# Patient Record
Sex: MALE | Race: BLACK OR AFRICAN AMERICAN | NOT HISPANIC OR LATINO | URBAN - METROPOLITAN AREA
[De-identification: names, ages, dates, MRNs, and addresses within clinical notes are randomized per-mention and may not be internally consistent; named-entity substitution may affect disease eponyms.]

---

## 2017-05-13 ENCOUNTER — EMERGENCY (EMERGENCY)
Facility: HOSPITAL | Age: 77
LOS: 1 days | Discharge: HOME | End: 2017-05-13
Attending: EMERGENCY MEDICINE
Payer: COMMERCIAL

## 2017-05-13 VITALS
WEIGHT: 160.06 LBS | TEMPERATURE: 98 F | OXYGEN SATURATION: 100 % | RESPIRATION RATE: 20 BRPM | SYSTOLIC BLOOD PRESSURE: 165 MMHG | HEART RATE: 88 BPM | HEIGHT: 71 IN | DIASTOLIC BLOOD PRESSURE: 72 MMHG

## 2017-05-13 VITALS
HEART RATE: 80 BPM | RESPIRATION RATE: 18 BRPM | DIASTOLIC BLOOD PRESSURE: 70 MMHG | SYSTOLIC BLOOD PRESSURE: 156 MMHG | TEMPERATURE: 98 F | OXYGEN SATURATION: 100 %

## 2017-05-13 DIAGNOSIS — Z98.89 OTHER SPECIFIED POSTPROCEDURAL STATES: Chronic | ICD-10-CM

## 2017-05-13 LAB
ANION GAP SERPL CALC-SCNC: 6 MMOL/L — SIGNIFICANT CHANGE UP (ref 5–17)
APPEARANCE UR: CLEAR — SIGNIFICANT CHANGE UP
BASOPHILS # BLD AUTO: 0.1 K/UL — SIGNIFICANT CHANGE UP (ref 0–0.2)
BASOPHILS NFR BLD AUTO: 1.3 % — SIGNIFICANT CHANGE UP (ref 0–2)
BILIRUB UR-MCNC: NEGATIVE — SIGNIFICANT CHANGE UP
BUN SERPL-MCNC: 14 MG/DL — SIGNIFICANT CHANGE UP (ref 7–18)
CALCIUM SERPL-MCNC: 10.4 MG/DL — SIGNIFICANT CHANGE UP (ref 8.4–10.5)
CHLORIDE SERPL-SCNC: 110 MMOL/L — HIGH (ref 96–108)
CO2 SERPL-SCNC: 26 MMOL/L — SIGNIFICANT CHANGE UP (ref 22–31)
COLOR SPEC: YELLOW — SIGNIFICANT CHANGE UP
CREAT SERPL-MCNC: 0.94 MG/DL — SIGNIFICANT CHANGE UP (ref 0.5–1.3)
DIFF PNL FLD: ABNORMAL
EOSINOPHIL # BLD AUTO: 0.3 K/UL — SIGNIFICANT CHANGE UP (ref 0–0.5)
EOSINOPHIL NFR BLD AUTO: 3.4 % — SIGNIFICANT CHANGE UP (ref 0–6)
GLUCOSE SERPL-MCNC: 89 MG/DL — SIGNIFICANT CHANGE UP (ref 70–99)
GLUCOSE UR QL: NEGATIVE — SIGNIFICANT CHANGE UP
HCT VFR BLD CALC: 43.3 % — SIGNIFICANT CHANGE UP (ref 39–50)
HGB BLD-MCNC: 14.4 G/DL — SIGNIFICANT CHANGE UP (ref 13–17)
KETONES UR-MCNC: NEGATIVE — SIGNIFICANT CHANGE UP
LACTATE SERPL-SCNC: 0.8 MMOL/L — SIGNIFICANT CHANGE UP (ref 0.7–2)
LEUKOCYTE ESTERASE UR-ACNC: NEGATIVE — SIGNIFICANT CHANGE UP
LYMPHOCYTES # BLD AUTO: 2.5 K/UL — SIGNIFICANT CHANGE UP (ref 1–3.3)
LYMPHOCYTES # BLD AUTO: 29.7 % — SIGNIFICANT CHANGE UP (ref 13–44)
MCHC RBC-ENTMCNC: 32.5 PG — SIGNIFICANT CHANGE UP (ref 27–34)
MCHC RBC-ENTMCNC: 33.1 GM/DL — SIGNIFICANT CHANGE UP (ref 32–36)
MCV RBC AUTO: 98.1 FL — SIGNIFICANT CHANGE UP (ref 80–100)
MONOCYTES # BLD AUTO: 0.7 K/UL — SIGNIFICANT CHANGE UP (ref 0–0.9)
MONOCYTES NFR BLD AUTO: 8.3 % — SIGNIFICANT CHANGE UP (ref 2–14)
NEUTROPHILS # BLD AUTO: 4.8 K/UL — SIGNIFICANT CHANGE UP (ref 1.8–7.4)
NEUTROPHILS NFR BLD AUTO: 57.3 % — SIGNIFICANT CHANGE UP (ref 43–77)
NITRITE UR-MCNC: NEGATIVE — SIGNIFICANT CHANGE UP
PH UR: 6 — SIGNIFICANT CHANGE UP (ref 5–8)
PLATELET # BLD AUTO: 171 K/UL — SIGNIFICANT CHANGE UP (ref 150–400)
POTASSIUM SERPL-MCNC: 4.1 MMOL/L — SIGNIFICANT CHANGE UP (ref 3.5–5.3)
POTASSIUM SERPL-SCNC: 4.1 MMOL/L — SIGNIFICANT CHANGE UP (ref 3.5–5.3)
PROT UR-MCNC: 15
RBC # BLD: 4.42 M/UL — SIGNIFICANT CHANGE UP (ref 4.2–5.8)
RBC # FLD: 11.9 % — SIGNIFICANT CHANGE UP (ref 10.3–14.5)
SODIUM SERPL-SCNC: 142 MMOL/L — SIGNIFICANT CHANGE UP (ref 135–145)
SP GR SPEC: 1.02 — SIGNIFICANT CHANGE UP (ref 1.01–1.02)
UROBILINOGEN FLD QL: NEGATIVE — SIGNIFICANT CHANGE UP
WBC # BLD: 8.4 K/UL — SIGNIFICANT CHANGE UP (ref 3.8–10.5)
WBC # FLD AUTO: 8.4 K/UL — SIGNIFICANT CHANGE UP (ref 3.8–10.5)

## 2017-05-13 PROCEDURE — 81001 URINALYSIS AUTO W/SCOPE: CPT

## 2017-05-13 PROCEDURE — 80048 BASIC METABOLIC PNL TOTAL CA: CPT

## 2017-05-13 PROCEDURE — 74020: CPT | Mod: 26

## 2017-05-13 PROCEDURE — 74020: CPT

## 2017-05-13 PROCEDURE — 96374 THER/PROPH/DIAG INJ IV PUSH: CPT

## 2017-05-13 PROCEDURE — 83605 ASSAY OF LACTIC ACID: CPT

## 2017-05-13 PROCEDURE — 99284 EMERGENCY DEPT VISIT MOD MDM: CPT | Mod: 25

## 2017-05-13 PROCEDURE — 85027 COMPLETE CBC AUTOMATED: CPT

## 2017-05-13 PROCEDURE — 99284 EMERGENCY DEPT VISIT MOD MDM: CPT

## 2017-05-13 RX ORDER — KETOROLAC TROMETHAMINE 30 MG/ML
30 SYRINGE (ML) INJECTION ONCE
Qty: 0 | Refills: 0 | Status: DISCONTINUED | OUTPATIENT
Start: 2017-05-13 | End: 2017-05-13

## 2017-05-13 RX ADMIN — Medication 30 MILLIGRAM(S): at 12:48

## 2017-05-13 RX ADMIN — Medication 30 MILLIGRAM(S): at 13:47

## 2017-05-13 NOTE — ED PROVIDER NOTE - OBJECTIVE STATEMENT
76 yo M with hx of HIV on BAUER, last CD4 700, presenting with multiple complaints that are non-acute.  First has had umbilical hernia x 3 years intermittently protruding with intermittent pain but no vomiting, constipation, fever or current pain.  Reducible prior to arrival.  Pt also reports positional L shoulder pain x years.  Pt also complains that last VL was slightly detectable, has fu for May 22nd to recheck VL with ID and asking to have VL checked today.

## 2017-05-13 NOTE — ED PROVIDER NOTE - GASTROINTESTINAL, MLM
Abdomen soft, non-tender, no guarding.  Umbilical hernia, soft, nontender, easily reducible.  Nl BS.

## 2017-05-13 NOTE — ED PROVIDER NOTE - PMH
Cardiomegaly    Elevated PSA    Essential hypertension    HIV (human immunodeficiency virus infection)    PVD (peripheral vascular disease)    Umbilical hernia    Urge incontinence

## 2017-05-13 NOTE — ED ADULT NURSE NOTE - OBJECTIVE STATEMENT
" I have the left shoulder pain for years but I cannot sleep . I have the ad hernia and the lower abdominal pain to the rt  side on and off '

## 2017-05-13 NOTE — ED PROVIDER NOTE - CARE PLAN
Principal Discharge DX:	Umbilical hernia without obstruction and without gangrene  Secondary Diagnosis:	Chronic left shoulder pain

## 2017-05-13 NOTE — ED PROVIDER NOTE - MEDICAL DECISION MAKING DETAILS
Chronic hernia and shoulder pain, no acute findings, wilde reassuring.  Plan outpt fu with niall and Dr. Page for elective repair.

## 2017-05-17 DIAGNOSIS — M25.512 PAIN IN LEFT SHOULDER: ICD-10-CM

## 2017-05-17 DIAGNOSIS — K42.9 UMBILICAL HERNIA WITHOUT OBSTRUCTION OR GANGRENE: ICD-10-CM

## 2017-05-17 DIAGNOSIS — Z21 ASYMPTOMATIC HUMAN IMMUNODEFICIENCY VIRUS [HIV] INFECTION STATUS: ICD-10-CM

## 2017-05-17 DIAGNOSIS — G89.29 OTHER CHRONIC PAIN: ICD-10-CM

## 2017-05-17 DIAGNOSIS — I10 ESSENTIAL (PRIMARY) HYPERTENSION: ICD-10-CM

## 2017-07-26 ENCOUNTER — APPOINTMENT (OUTPATIENT)
Dept: NUCLEAR MEDICINE | Facility: IMAGING CENTER | Age: 77
End: 2017-07-26

## 2017-07-26 ENCOUNTER — OUTPATIENT (OUTPATIENT)
Dept: OUTPATIENT SERVICES | Facility: HOSPITAL | Age: 77
LOS: 1 days | End: 2017-07-26
Payer: MEDICARE

## 2017-07-26 DIAGNOSIS — Z98.89 OTHER SPECIFIED POSTPROCEDURAL STATES: Chronic | ICD-10-CM

## 2017-07-26 DIAGNOSIS — E21.3 HYPERPARATHYROIDISM, UNSPECIFIED: ICD-10-CM

## 2017-07-26 PROCEDURE — A9512: CPT

## 2017-07-26 PROCEDURE — 78072 PARATHYRD PLANAR W/SPECT&CT: CPT

## 2017-07-26 PROCEDURE — A9500: CPT

## 2018-01-08 ENCOUNTER — OUTPATIENT (OUTPATIENT)
Dept: OUTPATIENT SERVICES | Facility: HOSPITAL | Age: 78
LOS: 1 days | End: 2018-01-08
Payer: MEDICARE

## 2018-01-08 VITALS
OXYGEN SATURATION: 100 % | HEART RATE: 95 BPM | DIASTOLIC BLOOD PRESSURE: 88 MMHG | HEIGHT: 71 IN | TEMPERATURE: 98 F | WEIGHT: 149.03 LBS | SYSTOLIC BLOOD PRESSURE: 157 MMHG | RESPIRATION RATE: 19 BRPM

## 2018-01-08 DIAGNOSIS — M75.42 IMPINGEMENT SYNDROME OF LEFT SHOULDER: ICD-10-CM

## 2018-01-08 DIAGNOSIS — M75.122 COMPLETE ROTATOR CUFF TEAR OR RUPTURE OF LEFT SHOULDER, NOT SPECIFIED AS TRAUMATIC: ICD-10-CM

## 2018-01-08 DIAGNOSIS — Z01.818 ENCOUNTER FOR OTHER PREPROCEDURAL EXAMINATION: ICD-10-CM

## 2018-01-08 DIAGNOSIS — Z98.890 OTHER SPECIFIED POSTPROCEDURAL STATES: Chronic | ICD-10-CM

## 2018-01-08 DIAGNOSIS — Z98.89 OTHER SPECIFIED POSTPROCEDURAL STATES: Chronic | ICD-10-CM

## 2018-01-08 PROCEDURE — G0463: CPT

## 2018-01-08 NOTE — H&P PST ADULT - ATTENDING COMMENTS
78yo with long h/o left shoulder pain increasing with restricted motion.  MRI left shoulder c/w rotator cuff tear and impingement.  For left shoulder arthroscopy.  Discussed with pt at length about MRI findings and recommended arthroscopy. Discussed alternatives, and risks including medical, anesthesia, infection, blood clot, possible need for additional surgery, and stiffness.   All questions answered and consent signed.

## 2018-01-08 NOTE — H&P PST ADULT - PSH
History of cystoscopy  with photovaporization, green light laser lithotripsy 2016  S/P hernia repair  right inguinal - 2013

## 2018-01-08 NOTE — H&P PST ADULT - ASSESSMENT
77year old male with complete rotator cuff or rupture of left shoulder/Impingement syndrome of left shoulder

## 2018-01-08 NOTE — H&P PST ADULT - HISTORY OF PRESENT ILLNESS
77year old male with pmhx of enlarged prostate, HIV in the 90"s, shingles, umbilical hernia, urinary incontinence with ocassional dysuria presents today with c/o left shoulder pain x 30years that has progressively gotten worse over the last 5years. Patient is here today for presurgical testing for scheduled left shoulder arthroscopy on 1/12/18

## 2018-01-08 NOTE — H&P PST ADULT - PMH
Cardiomegaly    Elevated PSA    Essential hypertension    HIV (human immunodeficiency virus infection)    PVD (peripheral vascular disease)    Shingles    Umbilical hernia    Urge incontinence

## 2018-01-11 RX ORDER — SODIUM CHLORIDE 9 MG/ML
3 INJECTION INTRAMUSCULAR; INTRAVENOUS; SUBCUTANEOUS EVERY 8 HOURS
Qty: 0 | Refills: 0 | Status: DISCONTINUED | OUTPATIENT
Start: 2018-01-12 | End: 2018-01-20

## 2018-01-11 RX ORDER — ACETAMINOPHEN 500 MG
975 TABLET ORAL ONCE
Qty: 0 | Refills: 0 | Status: COMPLETED | OUTPATIENT
Start: 2018-01-12 | End: 2018-01-12

## 2018-01-11 RX ORDER — CELECOXIB 200 MG/1
200 CAPSULE ORAL ONCE
Qty: 0 | Refills: 0 | Status: COMPLETED | OUTPATIENT
Start: 2018-01-12 | End: 2018-01-12

## 2018-01-12 ENCOUNTER — OUTPATIENT (OUTPATIENT)
Dept: OUTPATIENT SERVICES | Facility: HOSPITAL | Age: 78
LOS: 1 days | End: 2018-01-12
Payer: MEDICARE

## 2018-01-12 VITALS
OXYGEN SATURATION: 97 % | DIASTOLIC BLOOD PRESSURE: 81 MMHG | HEART RATE: 106 BPM | TEMPERATURE: 98 F | WEIGHT: 149.03 LBS | RESPIRATION RATE: 16 BRPM | SYSTOLIC BLOOD PRESSURE: 147 MMHG | HEIGHT: 71 IN

## 2018-01-12 VITALS
TEMPERATURE: 98 F | DIASTOLIC BLOOD PRESSURE: 74 MMHG | RESPIRATION RATE: 16 BRPM | OXYGEN SATURATION: 100 % | SYSTOLIC BLOOD PRESSURE: 135 MMHG | HEART RATE: 88 BPM

## 2018-01-12 DIAGNOSIS — Z01.818 ENCOUNTER FOR OTHER PREPROCEDURAL EXAMINATION: ICD-10-CM

## 2018-01-12 DIAGNOSIS — M75.42 IMPINGEMENT SYNDROME OF LEFT SHOULDER: ICD-10-CM

## 2018-01-12 DIAGNOSIS — Z98.89 OTHER SPECIFIED POSTPROCEDURAL STATES: Chronic | ICD-10-CM

## 2018-01-12 DIAGNOSIS — M75.122 COMPLETE ROTATOR CUFF TEAR OR RUPTURE OF LEFT SHOULDER, NOT SPECIFIED AS TRAUMATIC: ICD-10-CM

## 2018-01-12 DIAGNOSIS — Z98.890 OTHER SPECIFIED POSTPROCEDURAL STATES: Chronic | ICD-10-CM

## 2018-01-12 PROCEDURE — 29827 SHO ARTHRS SRG RT8TR CUF RPR: CPT | Mod: LT

## 2018-01-12 PROCEDURE — 29826 SHO ARTHRS SRG DECOMPRESSION: CPT | Mod: LT

## 2018-01-12 PROCEDURE — C1713: CPT

## 2018-01-12 RX ORDER — HYDROMORPHONE HYDROCHLORIDE 2 MG/ML
0.5 INJECTION INTRAMUSCULAR; INTRAVENOUS; SUBCUTANEOUS
Qty: 0 | Refills: 0 | Status: DISCONTINUED | OUTPATIENT
Start: 2018-01-12 | End: 2018-01-12

## 2018-01-12 RX ADMIN — SODIUM CHLORIDE 3 MILLILITER(S): 9 INJECTION INTRAMUSCULAR; INTRAVENOUS; SUBCUTANEOUS at 08:05

## 2018-01-12 RX ADMIN — Medication 975 MILLIGRAM(S): at 08:03

## 2018-01-12 RX ADMIN — CELECOXIB 200 MILLIGRAM(S): 200 CAPSULE ORAL at 08:03

## 2018-01-12 NOTE — ASU DISCHARGE PLAN (ADULT/PEDIATRIC). - ACTIVITY LEVEL
Keep left arm in shoulder immobilizer elevate extremity/no exercise/no heavy lifting/Keep left arm in shoulder immobilizer/no sports/gym

## 2018-01-12 NOTE — ASU DISCHARGE PLAN (ADULT/PEDIATRIC). - NOTIFY
Numbness, color, or temperature change to extremity/Pain not relieved by Medications/Fever greater than 101 Numbness, color, or temperature change to extremity/Bleeding that does not stop/Fever greater than 101/Pain not relieved by Medications

## 2018-01-12 NOTE — BRIEF OPERATIVE NOTE - POST-OP DX
Complete tear of left rotator cuff  01/12/2018  left shoulder.  partial labral tear, chondromalacia, bursitis, AC joint arthritis.  Active  Dez Linn

## 2018-01-12 NOTE — ASU DISCHARGE PLAN (ADULT/PEDIATRIC). - PAIN
prescription given by MD/patient has pain medication at home.Do not need a new Rx. prescription given by MD/patient has pain medication at home. Do not need a new Rx.

## 2018-01-12 NOTE — BRIEF OPERATIVE NOTE - PROCEDURE
<<-----Click on this checkbox to enter Procedure Arthroscopy  01/12/2018  left shoulder.  rotator cuff repair, debridement of labral tear, acromialplasty and subacromial decompression.  Active  SPILLAI

## 2018-01-12 NOTE — ASU DISCHARGE PLAN (ADULT/PEDIATRIC). - MEDICATION SUMMARY - MEDICATIONS TO TAKE
I will START or STAY ON the medications listed below when I get home from the hospital:    Ultram 50 mg oral tablet  -- 1 tab(s) by mouth every 4 hours, As Needed for pain  -- Indication: For pain    Genvoya oral tablet  -- 1 tab(s) by mouth once a day  -- Indication: For as directed    amLODIPine 10 mg oral tablet  -- 1 tab(s) by mouth once a day  -- Indication: For as directed    cilostazol 100 mg oral tablet  -- 1 tab(s) by mouth 2 times a day  -- Indication: For as directed

## 2018-01-12 NOTE — ASU DISCHARGE PLAN (ADULT/PEDIATRIC). - MEDICATION SUMMARY - MEDICATIONS TO STOP TAKING
I will STOP taking the medications listed below when I get home from the hospital:    acetaminophen-oxyCODONE 325 mg-5 mg oral tablet  -- 1 tab(s) by mouth every 4 hours, As needed, Moderate Pain

## 2018-01-15 ENCOUNTER — TRANSCRIPTION ENCOUNTER (OUTPATIENT)
Age: 78
End: 2018-01-15

## 2018-06-14 ENCOUNTER — EMERGENCY (EMERGENCY)
Facility: HOSPITAL | Age: 78
LOS: 1 days | Discharge: HOME | End: 2018-06-14
Attending: EMERGENCY MEDICINE
Payer: MEDICARE

## 2018-06-14 VITALS
TEMPERATURE: 98 F | RESPIRATION RATE: 20 BRPM | DIASTOLIC BLOOD PRESSURE: 85 MMHG | HEIGHT: 71 IN | SYSTOLIC BLOOD PRESSURE: 146 MMHG | WEIGHT: 162.92 LBS | OXYGEN SATURATION: 99 % | HEART RATE: 93 BPM

## 2018-06-14 VITALS
SYSTOLIC BLOOD PRESSURE: 152 MMHG | OXYGEN SATURATION: 100 % | HEART RATE: 68 BPM | RESPIRATION RATE: 20 BRPM | DIASTOLIC BLOOD PRESSURE: 80 MMHG | TEMPERATURE: 98 F

## 2018-06-14 DIAGNOSIS — Z98.89 OTHER SPECIFIED POSTPROCEDURAL STATES: Chronic | ICD-10-CM

## 2018-06-14 DIAGNOSIS — Z98.890 OTHER SPECIFIED POSTPROCEDURAL STATES: Chronic | ICD-10-CM

## 2018-06-14 LAB
ALBUMIN SERPL ELPH-MCNC: 3.8 G/DL — SIGNIFICANT CHANGE UP (ref 3.5–5)
ALP SERPL-CCNC: 117 U/L — SIGNIFICANT CHANGE UP (ref 40–120)
ALT FLD-CCNC: 29 U/L DA — SIGNIFICANT CHANGE UP (ref 10–60)
ANION GAP SERPL CALC-SCNC: 8 MMOL/L — SIGNIFICANT CHANGE UP (ref 5–17)
APPEARANCE UR: CLEAR — SIGNIFICANT CHANGE UP
AST SERPL-CCNC: 22 U/L — SIGNIFICANT CHANGE UP (ref 10–40)
BASOPHILS # BLD AUTO: 0.1 K/UL — SIGNIFICANT CHANGE UP (ref 0–0.2)
BASOPHILS NFR BLD AUTO: 1.1 % — SIGNIFICANT CHANGE UP (ref 0–2)
BILIRUB SERPL-MCNC: 0.3 MG/DL — SIGNIFICANT CHANGE UP (ref 0.2–1.2)
BILIRUB UR-MCNC: NEGATIVE — SIGNIFICANT CHANGE UP
BUN SERPL-MCNC: 15 MG/DL — SIGNIFICANT CHANGE UP (ref 7–18)
CALCIUM SERPL-MCNC: 10.4 MG/DL — SIGNIFICANT CHANGE UP (ref 8.4–10.5)
CHLORIDE SERPL-SCNC: 107 MMOL/L — SIGNIFICANT CHANGE UP (ref 96–108)
CO2 SERPL-SCNC: 25 MMOL/L — SIGNIFICANT CHANGE UP (ref 22–31)
COLOR SPEC: SIGNIFICANT CHANGE UP
CREAT SERPL-MCNC: 1.21 MG/DL — SIGNIFICANT CHANGE UP (ref 0.5–1.3)
DIFF PNL FLD: NEGATIVE — SIGNIFICANT CHANGE UP
EOSINOPHIL # BLD AUTO: 0.4 K/UL — SIGNIFICANT CHANGE UP (ref 0–0.5)
EOSINOPHIL NFR BLD AUTO: 3.6 % — SIGNIFICANT CHANGE UP (ref 0–6)
GLUCOSE SERPL-MCNC: 88 MG/DL — SIGNIFICANT CHANGE UP (ref 70–99)
GLUCOSE UR QL: NEGATIVE — SIGNIFICANT CHANGE UP
HCT VFR BLD CALC: 44.7 % — SIGNIFICANT CHANGE UP (ref 39–50)
HGB BLD-MCNC: 13.9 G/DL — SIGNIFICANT CHANGE UP (ref 13–17)
KETONES UR-MCNC: NEGATIVE — SIGNIFICANT CHANGE UP
LEUKOCYTE ESTERASE UR-ACNC: NEGATIVE — SIGNIFICANT CHANGE UP
LIDOCAIN IGE QN: 162 U/L — SIGNIFICANT CHANGE UP (ref 73–393)
LYMPHOCYTES # BLD AUTO: 2.4 K/UL — SIGNIFICANT CHANGE UP (ref 1–3.3)
LYMPHOCYTES # BLD AUTO: 22.6 % — SIGNIFICANT CHANGE UP (ref 13–44)
MCHC RBC-ENTMCNC: 30.6 PG — SIGNIFICANT CHANGE UP (ref 27–34)
MCHC RBC-ENTMCNC: 31.1 GM/DL — LOW (ref 32–36)
MCV RBC AUTO: 98.4 FL — SIGNIFICANT CHANGE UP (ref 80–100)
MONOCYTES # BLD AUTO: 0.5 K/UL — SIGNIFICANT CHANGE UP (ref 0–0.9)
MONOCYTES NFR BLD AUTO: 5.1 % — SIGNIFICANT CHANGE UP (ref 2–14)
NEUTROPHILS # BLD AUTO: 7.2 K/UL — SIGNIFICANT CHANGE UP (ref 1.8–7.4)
NEUTROPHILS NFR BLD AUTO: 67.7 % — SIGNIFICANT CHANGE UP (ref 43–77)
NITRITE UR-MCNC: NEGATIVE — SIGNIFICANT CHANGE UP
PH UR: 6 — SIGNIFICANT CHANGE UP (ref 5–8)
PLATELET # BLD AUTO: 199 K/UL — SIGNIFICANT CHANGE UP (ref 150–400)
POTASSIUM SERPL-MCNC: 5 MMOL/L — SIGNIFICANT CHANGE UP (ref 3.5–5.3)
POTASSIUM SERPL-SCNC: 5 MMOL/L — SIGNIFICANT CHANGE UP (ref 3.5–5.3)
PROT SERPL-MCNC: 7.4 G/DL — SIGNIFICANT CHANGE UP (ref 6–8.3)
PROT UR-MCNC: NEGATIVE — SIGNIFICANT CHANGE UP
RBC # BLD: 4.54 M/UL — SIGNIFICANT CHANGE UP (ref 4.2–5.8)
RBC # FLD: 13.3 % — SIGNIFICANT CHANGE UP (ref 10.3–14.5)
SODIUM SERPL-SCNC: 140 MMOL/L — SIGNIFICANT CHANGE UP (ref 135–145)
SP GR SPEC: 1.01 — SIGNIFICANT CHANGE UP (ref 1.01–1.02)
UROBILINOGEN FLD QL: NEGATIVE — SIGNIFICANT CHANGE UP
WBC # BLD: 10.6 K/UL — HIGH (ref 3.8–10.5)
WBC # FLD AUTO: 10.6 K/UL — HIGH (ref 3.8–10.5)

## 2018-06-14 PROCEDURE — 99285 EMERGENCY DEPT VISIT HI MDM: CPT

## 2018-06-14 PROCEDURE — 81003 URINALYSIS AUTO W/O SCOPE: CPT

## 2018-06-14 PROCEDURE — 85027 COMPLETE CBC AUTOMATED: CPT

## 2018-06-14 PROCEDURE — 87086 URINE CULTURE/COLONY COUNT: CPT

## 2018-06-14 PROCEDURE — 74177 CT ABD & PELVIS W/CONTRAST: CPT | Mod: 26

## 2018-06-14 PROCEDURE — 74177 CT ABD & PELVIS W/CONTRAST: CPT

## 2018-06-14 PROCEDURE — 80053 COMPREHEN METABOLIC PANEL: CPT

## 2018-06-14 PROCEDURE — 83690 ASSAY OF LIPASE: CPT

## 2018-06-14 PROCEDURE — 99284 EMERGENCY DEPT VISIT MOD MDM: CPT | Mod: 25

## 2018-06-14 NOTE — ED ADULT NURSE NOTE - CHPI ED SYMPTOMS NEG
no blood in stool/no dysuria/no diarrhea/no chills/no vomiting/no fever/no abdominal distension/no burning urination/no nausea/no hematuria

## 2018-06-14 NOTE — ED PROVIDER NOTE - PMH
Cardiomegaly    Elevated PSA    Enlarged prostate    Essential hypertension    HIV (human immunodeficiency virus infection)    PVD (peripheral vascular disease)    Shingles    Umbilical hernia    Urge incontinence

## 2018-06-14 NOTE — ED ADULT NURSE NOTE - OBJECTIVE STATEMENT
Patient presents to ED c/o abdominal pain  on and off for past couple of months. Patient states he has prostate problems and at present has no pain. Patient states"pain comes in intervals and he has no control over his urination at present he is wearing diaper.

## 2018-06-14 NOTE — ED PROVIDER NOTE - MEDICAL DECISION MAKING DETAILS
Pt with lower abd pain. Will check labs, UA, CT and reassess. Pt with lower abd pain. Will check labs, UA, CT and reassess.  labs CT scan normal. home with primary care physician followup

## 2018-06-14 NOTE — ED ADULT NURSE NOTE - AREA
CHYNA courtesy reminder call to patient; patient answered at [phone]. SW reminded patient of date, time, address, and bus route of her [facility] counseling appt tomorrow. Patient had concerns with having another appt at 2 tomorrow; SW encouraged patient to go to both appts but that if she felt it was going to be to hard she should call and reschedule at [facility] by calling [phone] (patient wrote phone number and appt details down). Patient stated no other questions or concerns at this time. Patient is aware SW available as future needs arise.    diffuse

## 2018-06-14 NOTE — ED PROVIDER NOTE - OBJECTIVE STATEMENT
77 y/o M pt with PMHx of cardiomegaly, elevated PSA, enlarged prostate, HTN, HIV, PVD, shingles, umbilical hernia s/p repair, and urge incontinence presents to ED c/o lower abd pain, intermittently x "several" months; worsening x 3 months. Pt states pain is worse when lying down. Pt denies fever, chills, nausea, vomiting, diarrhea, decreases appetite, hematuria or any other complaints. Last prostate exam, done by [facility], done 3 months ago showed enlarged prostate, as per pt.

## 2018-06-15 LAB
CULTURE RESULTS: SIGNIFICANT CHANGE UP
SPECIMEN SOURCE: SIGNIFICANT CHANGE UP

## 2018-06-27 ENCOUNTER — APPOINTMENT (OUTPATIENT)
Dept: UROLOGY | Facility: CLINIC | Age: 78
End: 2018-06-27
Payer: MEDICARE

## 2018-06-27 ENCOUNTER — LABORATORY RESULT (OUTPATIENT)
Age: 78
End: 2018-06-27

## 2018-06-27 DIAGNOSIS — Z86.79 PERSONAL HISTORY OF OTHER DISEASES OF THE CIRCULATORY SYSTEM: ICD-10-CM

## 2018-06-27 DIAGNOSIS — B20 HUMAN IMMUNODEFICIENCY VIRUS [HIV] DISEASE: ICD-10-CM

## 2018-06-27 PROCEDURE — 99213 OFFICE O/P EST LOW 20 MIN: CPT

## 2018-07-23 PROBLEM — B02.9 ZOSTER WITHOUT COMPLICATIONS: Chronic | Status: ACTIVE | Noted: 2018-01-08

## 2018-07-23 PROBLEM — N40.0 BENIGN PROSTATIC HYPERPLASIA WITHOUT LOWER URINARY TRACT SYMPTOMS: Chronic | Status: ACTIVE | Noted: 2018-06-14

## 2018-07-25 ENCOUNTER — APPOINTMENT (OUTPATIENT)
Dept: UROLOGY | Facility: CLINIC | Age: 78
End: 2018-07-25
Payer: MEDICARE

## 2018-07-25 VITALS
SYSTOLIC BLOOD PRESSURE: 118 MMHG | OXYGEN SATURATION: 98 % | RESPIRATION RATE: 18 BRPM | DIASTOLIC BLOOD PRESSURE: 60 MMHG | WEIGHT: 173 LBS | HEART RATE: 88 BPM

## 2018-07-25 PROCEDURE — 51741 ELECTRO-UROFLOWMETRY FIRST: CPT

## 2018-07-25 PROCEDURE — 52000 CYSTOURETHROSCOPY: CPT

## 2018-10-04 ENCOUNTER — APPOINTMENT (OUTPATIENT)
Dept: NEUROLOGY | Facility: CLINIC | Age: 78
End: 2018-10-04

## 2018-12-14 ENCOUNTER — APPOINTMENT (OUTPATIENT)
Dept: UROLOGY | Facility: CLINIC | Age: 78
End: 2018-12-14
Payer: MEDICARE

## 2018-12-14 VITALS
WEIGHT: 180 LBS | HEART RATE: 84 BPM | RESPIRATION RATE: 16 BRPM | OXYGEN SATURATION: 97 % | HEIGHT: 71 IN | BODY MASS INDEX: 25.2 KG/M2 | SYSTOLIC BLOOD PRESSURE: 140 MMHG | DIASTOLIC BLOOD PRESSURE: 74 MMHG

## 2018-12-14 PROCEDURE — 99213 OFFICE O/P EST LOW 20 MIN: CPT

## 2018-12-18 LAB
APPEARANCE: CLEAR
BACTERIA UR CULT: ABNORMAL
BACTERIA: ABNORMAL
BILIRUBIN URINE: NEGATIVE
BLOOD URINE: NEGATIVE
COLOR: YELLOW
GLUCOSE QUALITATIVE U: NEGATIVE MG/DL
HYALINE CASTS: 1 /LPF
KETONES URINE: NEGATIVE
LEUKOCYTE ESTERASE URINE: ABNORMAL
MICROSCOPIC-UA: NORMAL
NITRITE URINE: POSITIVE
PH URINE: 6
PROTEIN URINE: NEGATIVE MG/DL
RED BLOOD CELLS URINE: 2 /HPF
SPECIFIC GRAVITY URINE: 1.02
SQUAMOUS EPITHELIAL CELLS: 1 /HPF
UROBILINOGEN URINE: NEGATIVE MG/DL
WHITE BLOOD CELLS URINE: 18 /HPF

## 2018-12-20 ENCOUNTER — RESULT REVIEW (OUTPATIENT)
Age: 78
End: 2018-12-20

## 2019-09-11 ENCOUNTER — APPOINTMENT (OUTPATIENT)
Dept: UROLOGY | Facility: CLINIC | Age: 79
End: 2019-09-11
Payer: MEDICARE

## 2019-09-11 VITALS
DIASTOLIC BLOOD PRESSURE: 81 MMHG | BODY MASS INDEX: 24.92 KG/M2 | WEIGHT: 178 LBS | SYSTOLIC BLOOD PRESSURE: 132 MMHG | HEART RATE: 95 BPM | HEIGHT: 71 IN

## 2019-09-11 PROCEDURE — 99214 OFFICE O/P EST MOD 30 MIN: CPT

## 2019-09-11 NOTE — HISTORY OF PRESENT ILLNESS
[FreeTextEntry1] : cc incontinence \par s/p greenlight still has frequency urgency nocturia \par last psa 4.3\par still drinks 3 cups coffee and 3 cups coke daily \par \par Units\par \par 4mo ago\par (7/19/18)\par \par 11mo ago\par (1/10/18)\par \par 1yr ago\par (5/17/17)\par \par 2yr ago\par (3/1/16)\par \par 3yr ago\par (12/8/15)\par \par 3yr ago\par (12/29/14)\par \par 4yr ago\par (5/13/14)\par \par PSA, Total Screen, Medicare <=4.0 ng/mL 2.8  2.6 CM 3.7 CM 1.8 CM 1.6 CM 1.9 CM 5.31 Abnormally high  R \par

## 2019-09-13 LAB
APPEARANCE: ABNORMAL
BACTERIA: ABNORMAL
BILIRUBIN URINE: NEGATIVE
BLOOD URINE: NEGATIVE
COLOR: YELLOW
GLUCOSE QUALITATIVE U: NEGATIVE
HYALINE CASTS: 3 /LPF
KETONES URINE: NEGATIVE
LEUKOCYTE ESTERASE URINE: ABNORMAL
MICROSCOPIC-UA: NORMAL
NITRITE URINE: NEGATIVE
PH URINE: 6
PROTEIN URINE: NORMAL
RED BLOOD CELLS URINE: 3 /HPF
SPECIFIC GRAVITY URINE: 1.02
SQUAMOUS EPITHELIAL CELLS: 4 /HPF
UROBILINOGEN URINE: NORMAL
WHITE BLOOD CELLS URINE: 11 /HPF

## 2019-09-19 LAB — BACTERIA UR CULT: ABNORMAL

## 2019-09-23 ENCOUNTER — RESULT REVIEW (OUTPATIENT)
Age: 79
End: 2019-09-23

## 2019-10-23 ENCOUNTER — APPOINTMENT (OUTPATIENT)
Dept: UROLOGY | Facility: CLINIC | Age: 79
End: 2019-10-23
Payer: MEDICARE

## 2019-10-23 VITALS
HEART RATE: 100 BPM | HEIGHT: 71 IN | DIASTOLIC BLOOD PRESSURE: 86 MMHG | WEIGHT: 178 LBS | SYSTOLIC BLOOD PRESSURE: 131 MMHG | BODY MASS INDEX: 24.92 KG/M2 | TEMPERATURE: 97 F

## 2019-10-23 PROCEDURE — 99214 OFFICE O/P EST MOD 30 MIN: CPT

## 2019-10-23 NOTE — HISTORY OF PRESENT ILLNESS
[FreeTextEntry1] : cc incontinence \par s/p greenlight still has frequency urgency nocturia \par last psa 4.3\par still drinks 3 cups coffee and 3 cups coke daily \par last vist had proteus uti \par completed abx feels alittle better\par renal bladder sono \par mildly elevated residual 98 ml \par enlarged prostate no stones\par c/o ed \par worsening last few years difficulty maintaining erections \par has reasonable exercise tolerance \par Units\par \par 4mo ago\par (7/19/18)\par \par 11mo ago\par (1/10/18)\par \par 1yr ago\par (5/17/17)\par \par 2yr ago\par (3/1/16)\par \par 3yr ago\par (12/8/15)\par \par 3yr ago\par (12/29/14)\par \par 4yr ago\par (5/13/14)\par \par PSA, Total Screen, Medicare <=4.0 ng/mL 2.8  2.6 CM 3.7 CM 1.8 CM 1.6 CM 1.9 CM 5.31 Abnormally high  R \par

## 2019-10-23 NOTE — PHYSICAL EXAM
[General Appearance - Well Developed] : well developed [General Appearance - Well Nourished] : well nourished [Normal Appearance] : normal appearance [Well Groomed] : well groomed [General Appearance - In No Acute Distress] : no acute distress [Abdomen Tenderness] : non-tender [Abdomen Soft] : soft [Urethral Meatus] : meatus normal [Costovertebral Angle Tenderness] : no ~M costovertebral angle tenderness [Urinary Bladder Findings] : the bladder was normal on palpation [Scrotum] : the scrotum was normal [Testes Mass (___cm)] : there were no testicular masses [Edema] : no peripheral edema [] : no respiratory distress [Respiration, Rhythm And Depth] : normal respiratory rhythm and effort [Oriented To Time, Place, And Person] : oriented to person, place, and time [Exaggerated Use Of Accessory Muscles For Inspiration] : no accessory muscle use [Affect] : the affect was normal [Mood] : the mood was normal [Not Anxious] : not anxious [Normal Station and Gait] : the gait and station were normal for the patient's age [No Palpable Adenopathy] : no palpable adenopathy [No Focal Deficits] : no focal deficits

## 2019-10-23 NOTE — ASSESSMENT
[FreeTextEntry1] : poncho reviewed \par no stones\par repeat ucx\par must decrease caffeine intake \par cont meds\par \par reviewed pde5 inhibitors \par concered about his medical conditions \par advise f/u cardiology prior to initiating treament

## 2019-10-27 LAB — BACTERIA UR CULT: ABNORMAL

## 2019-10-28 ENCOUNTER — RESULT REVIEW (OUTPATIENT)
Age: 79
End: 2019-10-28

## 2020-02-24 NOTE — ASU PATIENT PROFILE, ADULT - CIGARETTE, PACK YRS
LOS: 2 days  PATIENT IS NOT A MEDICARE BUNDLE OR A 30 DAY READMISSION  Met with patient  Explained role of care management  Patient lives alone in a one story home with no steps to enter  He is independent adl's and ambulation, drives, is self employed, provides his own meals  DME - denies  Past services - denies history of VNA, SNF, MH or D&A  Pharmacy of choice is [facility] in [address]  He has a prescription plan and is able to afford his medication  Patient had concerns about hospital bill  Discussed Medicare part A and $1408 deductible for 2020  His brother is his POA/he has an AD  States his siblings would help at home if needed  He prefers to return home at discharge and does anticipate any discharge needs  Will follow  CM reviewed d/c planning process including the following: identifying help at home, patient preference for d/c planning needs,  availability of treatment team to discuss questions or concerns patient and/or family may have regarding understanding medications and recognizing signs and symptoms once discharged  CM also encouraged patient to follow up with all recommended appointments after discharge  Patient advised of importance for patient and family to participate in managing patients medical well being 
LOS: 6   Met with patient, he is for discharge today  Patient notified this CM he has a PCP, Dr Juan José Macias in [place] number [phone]  Offered to schedule a follow up appointment and he stated he would do it to fit his schedule Educated the patienton the importance of following up with his PCP  Patient also reports he uses [facility] in [place], [phone] for his medication  Notiifed Kira Chavez  Patient's nurse of above in order for patient's script to be faxed to [facility]  Friend to transport patient home 
1

## 2021-01-27 ENCOUNTER — APPOINTMENT (OUTPATIENT)
Dept: UROLOGY | Facility: CLINIC | Age: 81
End: 2021-01-27
Payer: MEDICARE

## 2021-01-27 PROCEDURE — 99214 OFFICE O/P EST MOD 30 MIN: CPT

## 2021-01-27 PROCEDURE — 99072 ADDL SUPL MATRL&STAF TM PHE: CPT

## 2021-02-02 LAB — BACTERIA UR CULT: NORMAL

## 2021-02-02 NOTE — HISTORY OF PRESENT ILLNESS
[FreeTextEntry1] : cc incontinence \par s/p greenlight still has frequency urgency nocturia \par last psa 3.8 11/20\par still drinks 3 cups coffee and 3 cups coke daily \par last vist had proteus uti \par completed abx feels alittle better\par renal bladder sono \par mildly elevated residual 98 ml \par enlarged prostate no stones\par c/o ed \par worsening last few years difficulty maintaining erections \par has reasonable exercise tolerance \par Units\par \par PSA, Total Screen, Medicare <=4.0 ng/mL 2.8  2.6 CM 3.7 CM 1.8 CM 1.6 CM 1.9 CM 5.31 Abnormally high  R \par

## 2021-07-06 NOTE — H&P PST ADULT - RESPIRATORY
I reviewed the audiogram report from 6/29/2021 compared with 6/26/2019 which appears slightly improved regarding the right hearing.   Breath Sounds equal & clear to percussion & auscultation, no accessory muscle use

## 2021-09-20 NOTE — ASU PATIENT PROFILE, ADULT - ABILITY TO HEAR (WITH HEARING AID OR HEARING APPLIANCE IF NORMALLY USED):
Adequate: hears normal conversation without difficulty Nostril Rim Text: The closure involved the nostril rim.

## 2021-10-08 ENCOUNTER — APPOINTMENT (OUTPATIENT)
Dept: UROLOGY | Facility: CLINIC | Age: 81
End: 2021-10-08
Payer: MEDICARE

## 2021-10-08 DIAGNOSIS — R39.15 URGENCY OF URINATION: ICD-10-CM

## 2021-10-08 PROCEDURE — 99213 OFFICE O/P EST LOW 20 MIN: CPT

## 2021-10-14 ENCOUNTER — NON-APPOINTMENT (OUTPATIENT)
Age: 81
End: 2021-10-14

## 2021-10-14 LAB — BACTERIA UR CULT: ABNORMAL

## 2021-10-27 PROBLEM — R39.15 URGENCY OF URINATION: Status: ACTIVE | Noted: 2018-07-25

## 2021-12-23 NOTE — CONSULT LETTER
[Dear  ___] : Dear  [unfilled], [Consult Letter:] : I had the pleasure of evaluating your patient, [unfilled]. [Consult Closing:] : Thank you very much for allowing me to participate in the care of this patient.  If you have any questions, please do not hesitate to contact me. [Sincerely,] : Sincerely, [FreeTextEntry3] : Dr Page

## 2021-12-27 ENCOUNTER — APPOINTMENT (OUTPATIENT)
Dept: SURGERY | Facility: CLINIC | Age: 81
End: 2021-12-27

## 2022-01-03 ENCOUNTER — APPOINTMENT (OUTPATIENT)
Dept: SURGERY | Facility: CLINIC | Age: 82
End: 2022-01-03
Payer: MEDICARE

## 2022-01-03 VITALS — HEART RATE: 76 BPM | DIASTOLIC BLOOD PRESSURE: 76 MMHG | SYSTOLIC BLOOD PRESSURE: 121 MMHG

## 2022-01-03 VITALS — HEIGHT: 70 IN | BODY MASS INDEX: 22.48 KG/M2 | WEIGHT: 157 LBS

## 2022-01-03 VITALS — TEMPERATURE: 96.8 F

## 2022-01-03 DIAGNOSIS — K42.9 UMBILICAL HERNIA W/OUT OBSTRUCTION OR GANGRENE: ICD-10-CM

## 2022-01-03 DIAGNOSIS — I10 ESSENTIAL (PRIMARY) HYPERTENSION: ICD-10-CM

## 2022-01-03 DIAGNOSIS — Z78.9 OTHER SPECIFIED HEALTH STATUS: ICD-10-CM

## 2022-01-03 PROCEDURE — 99203 OFFICE O/P NEW LOW 30 MIN: CPT

## 2022-01-03 RX ORDER — CHROMIUM 200 MCG
TABLET ORAL
Refills: 0 | Status: ACTIVE | COMMUNITY

## 2022-01-03 RX ORDER — TRAMADOL HYDROCHLORIDE 25 MG/1
TABLET, COATED ORAL
Refills: 0 | Status: ACTIVE | COMMUNITY

## 2022-01-03 RX ORDER — RAMIPRIL 2.5 MG/1
2.5 CAPSULE ORAL
Refills: 0 | Status: ACTIVE | COMMUNITY

## 2022-01-03 RX ORDER — ASPIRIN 325 MG/1
TABLET, FILM COATED ORAL
Refills: 0 | Status: ACTIVE | COMMUNITY

## 2022-01-03 RX ORDER — SULFAMETHOXAZOLE AND TRIMETHOPRIM 800; 160 MG/1; MG/1
800-160 TABLET ORAL
Qty: 14 | Refills: 0 | Status: DISCONTINUED | COMMUNITY
Start: 2018-12-18 | End: 2022-01-03

## 2022-01-03 RX ORDER — ELVITEGRAVIR, COBICISTAT, EMTRICITABINE, AND TENOFOVIR ALAFENAMIDE 150; 150; 200; 10 MG/1; MG/1; MG/1; MG/1
TABLET ORAL
Refills: 0 | Status: ACTIVE | COMMUNITY

## 2022-01-03 RX ORDER — CILOSTAZOL 50 MG/1
TABLET ORAL
Refills: 0 | Status: ACTIVE | COMMUNITY

## 2022-01-03 RX ORDER — CIPROFLOXACIN HYDROCHLORIDE 500 MG/1
500 TABLET, FILM COATED ORAL
Qty: 14 | Refills: 0 | Status: DISCONTINUED | COMMUNITY
Start: 2019-10-27 | End: 2022-01-03

## 2022-01-03 RX ORDER — CALCITRIOL 0.5 UG/1
CAPSULE, LIQUID FILLED ORAL
Refills: 0 | Status: ACTIVE | COMMUNITY

## 2022-01-03 RX ORDER — CLOPIDOGREL 75 MG/1
TABLET, FILM COATED ORAL
Refills: 0 | Status: ACTIVE | COMMUNITY

## 2022-01-03 RX ORDER — NIFEDIPINE 90 MG
TABLET, EXTENDED RELEASE ORAL
Refills: 0 | Status: ACTIVE | COMMUNITY

## 2022-01-03 RX ORDER — TOLTERODINE TARTRATE 2 MG/1
2 CAPSULE, EXTENDED RELEASE ORAL
Qty: 30 | Refills: 1 | Status: DISCONTINUED | COMMUNITY
Start: 2018-07-25 | End: 2022-01-03

## 2022-01-03 RX ORDER — CEFUROXIME AXETIL 500 MG/1
500 TABLET ORAL
Qty: 14 | Refills: 0 | Status: DISCONTINUED | COMMUNITY
Start: 2019-09-19 | End: 2022-01-03

## 2022-01-03 NOTE — REVIEW OF SYSTEMS
[Incontinence] : incontinence [Hesitancy] : urinary hesitancy [Nocturia] : nocturia [Negative] : Heme/Lymph [Fever] : no fever [Chills] : no chills [Feeling Poorly] : not feeling poorly [Feeling Tired] : not feeling tired [Shortness Of Breath] : no shortness of breath [Wheezing] : no wheezing [Cough] : no cough [SOB on Exertion] : no shortness of breath during exertion [Abdominal Pain] : no abdominal pain [Vomiting] : no vomiting [Constipation] : no constipation [Diarrhea] : no diarrhea [Dysuria] : no dysuria [Genital Lesion] : no genital lesions [Testicular Pain] : no testicular pain

## 2022-01-03 NOTE — HISTORY OF PRESENT ILLNESS
[de-identified] : BABAK ROSENTHAL is a 81 year old male who present in the office for initial consultation who was referred by Dr. Aguayo with the chief complaint of having pain and swelling in his Left groin.  Patient  has had the condition for long time and is worse when he is walking or standing.  Patient is stating that the hernia is getting bigger and symptomatic. Patient has PSHx of right inguinal hernia repair with Dr Page 5 years ago. On physical exam he found to have a Left inguinal hernia and umbilical hernia. Umbilical hernia is nonsymptomatic and patient wishes to have a left inguinal hernia repair. Patient is on  Aspirin and Plavix daily.

## 2022-01-03 NOTE — ASSESSMENT
[FreeTextEntry1] : 81 year M for Left  inguinal hernia repair with mesh. Risks, benefits and alternatives discussed including bleeding, infection, recurrence, nerve injury, chronic pain, testicular atrophy or swelling and hematoma. All questions answered. Agrees to proceed. \par \par \par Patient also has an umbilical hernia that is not bothersome and he wishes only have left inguinal hernia repair at present time \par \par

## 2022-01-03 NOTE — PHYSICAL EXAM
[Normal Breath Sounds] : Normal breath sounds [Normal Heart Sounds] : normal heart sounds [Normal Rate and Rhythm] : normal rate and rhythm [No Rash or Lesion] : No rash or lesion [Alert] : alert [Oriented to Person] : oriented to person [Oriented to Place] : oriented to place [Oriented to Time] : oriented to time [Calm] : calm [de-identified] : The patient is alert, well-groomed  [de-identified] : Head is normocephalic. Conjunctiva pink, anicteric. Nasal mucosa pink, septum midline. Oral mucosa pink. Tongue midline, pharynx without exudates.  [de-identified] : Normoactive bowel sounds, soft and nontender, no hepatosplenomegaly or masses noted, Patient  umbilical hernia  [de-identified] :  reducible Left inguinal hernia.  No evidence of hernia on the opposite side.  No penile discharge or lesions.  No scrotal swelling or discoloration. Testes descended bilaterally, smooth, without masses. Epididymis non-tender.  [de-identified] : full range of motion and no deformities appreciated, weakness

## 2022-01-03 NOTE — PLAN
[FreeTextEntry1] : Mr. BABAK ROSENTHAL Patient was told significance of findings, options, risks and benefits were explained. Informed consent for  Left  inguinal hernia repair with mesh and potential risks, benefits and alternatives (surgical options were discussed including non-surgical options or the option of no surgery) to the planned surgery were discussed in depth. All surgical options were discussed including non-surgical treatments. The patient wishes to proceed with surgery. We will plan for surgery on at the next available date, pending any required insurance pre-certification or pre-approval. Patient agrees to obtain any necessary pre-operative evaluations and testing prior to surgery.\par Patient advised to seek immediate medical attention with any acute change in symptoms or with the development of any new or worsening symptoms. Patient's questions and concerns addressed to patient's satisfaction, and patient verbalized an understanding of the information discussed.\par \par Will schedule for the surgery at [facility].\par \par PST\par COVID\par Medical Clearance

## 2022-01-20 ENCOUNTER — OUTPATIENT (OUTPATIENT)
Dept: OUTPATIENT SERVICES | Facility: HOSPITAL | Age: 82
LOS: 1 days | End: 2022-01-20
Payer: MEDICARE

## 2022-01-20 VITALS
RESPIRATION RATE: 17 BRPM | OXYGEN SATURATION: 100 % | DIASTOLIC BLOOD PRESSURE: 65 MMHG | HEIGHT: 71 IN | HEART RATE: 98 BPM | TEMPERATURE: 99 F | SYSTOLIC BLOOD PRESSURE: 129 MMHG | WEIGHT: 158.07 LBS

## 2022-01-20 DIAGNOSIS — Z98.49 CATARACT EXTRACTION STATUS, UNSPECIFIED EYE: Chronic | ICD-10-CM

## 2022-01-20 DIAGNOSIS — K40.90 UNILATERAL INGUINAL HERNIA, WITHOUT OBSTRUCTION OR GANGRENE, NOT SPECIFIED AS RECURRENT: ICD-10-CM

## 2022-01-20 DIAGNOSIS — Z01.818 ENCOUNTER FOR OTHER PREPROCEDURAL EXAMINATION: ICD-10-CM

## 2022-01-20 DIAGNOSIS — Z98.89 OTHER SPECIFIED POSTPROCEDURAL STATES: Chronic | ICD-10-CM

## 2022-01-20 DIAGNOSIS — Z29.9 ENCOUNTER FOR PROPHYLACTIC MEASURES, UNSPECIFIED: ICD-10-CM

## 2022-01-20 DIAGNOSIS — I73.9 PERIPHERAL VASCULAR DISEASE, UNSPECIFIED: Chronic | ICD-10-CM

## 2022-01-20 DIAGNOSIS — I10 ESSENTIAL (PRIMARY) HYPERTENSION: ICD-10-CM

## 2022-01-20 DIAGNOSIS — Z98.890 OTHER SPECIFIED POSTPROCEDURAL STATES: Chronic | ICD-10-CM

## 2022-01-20 DIAGNOSIS — G47.33 OBSTRUCTIVE SLEEP APNEA (ADULT) (PEDIATRIC): ICD-10-CM

## 2022-01-20 LAB — BLD GP AB SCN SERPL QL: SIGNIFICANT CHANGE UP

## 2022-01-20 PROCEDURE — G0463: CPT

## 2022-01-20 RX ORDER — TRAMADOL HYDROCHLORIDE 50 MG/1
1 TABLET ORAL
Qty: 0 | Refills: 0 | DISCHARGE

## 2022-01-20 RX ORDER — SODIUM CHLORIDE 9 MG/ML
3 INJECTION INTRAMUSCULAR; INTRAVENOUS; SUBCUTANEOUS EVERY 8 HOURS
Refills: 0 | Status: DISCONTINUED | OUTPATIENT
Start: 2022-02-09 | End: 2022-02-09

## 2022-01-20 NOTE — H&P PST ADULT - PROBLEM SELECTOR PLAN 2
Stop bang score = 3 pt denies any sleep apnea Pt to maintain patent airway during hospital stay and upon discharge.

## 2022-01-20 NOTE — H&P PST ADULT - NSICDXPASTSURGICALHX_GEN_ALL_CORE_FT
PAST SURGICAL HISTORY:  History of cystoscopy with photovaporization, green light laser lithotripsy 2016    PVD (peripheral vascular disease) bilateral leg stents 2020    S/P hernia repair right inguinal - 2013

## 2022-01-20 NOTE — H&P PST ADULT - ASSESSMENT
82 yr old Black male with history of high blood pressure, HIV, PVD ( had 2 stents placed in legs to improve circulation on Plavix) BPH (still has incontinence and nocturia on medication) presents with left inguinal hernia. Pt stated had his physical and it was noted that hernia was present. Pt had surgical consult and now schedule for open left inguinal hernia repair with mesh on 2/9/2022. Pt instructed to stop Plavix three days before surgery. Pt given written instructions for surgery.

## 2022-01-20 NOTE — H&P PST ADULT - FALL HARM RISK - UNIVERSAL INTERVENTIONS
Bed in lowest position, wheels locked, appropriate side rails in place/Call bell, personal items and telephone in reach/Instruct patient to call for assistance before getting out of bed or chair/Non-slip footwear when patient is out of bed/[place] to call system/Physically safe environment - no spills, clutter or unnecessary equipment/Purposeful Proactive Rounding/Room/bathroom lighting operational, light cord in reach

## 2022-01-20 NOTE — H&P PST ADULT - NSICDXPASTMEDICALHX_GEN_ALL_CORE_FT
PAST MEDICAL HISTORY:  Cardiomegaly     Elevated PSA     Enlarged prostate     Essential hypertension     HIV (human immunodeficiency virus infection)     PVD (peripheral vascular disease)     Shingles     Umbilical hernia     Unilateral inguinal hernia, without obstruction or gangrene, not specified as recurrent     Urge incontinence

## 2022-01-20 NOTE — H&P PST ADULT - GASTROINTESTINAL DETAILS
Patient has hypokalemia which is currently uncontrolled. Last electrolytes reviewed-   Recent Labs   Lab 09/23/20  0554 09/24/20  0354   K 3.1* 3.2*   . Will replace potassium and monitor electrolytes closely.    normal/soft/nontender/bowel sounds normal

## 2022-02-07 LAB — SARS-COV-2 N GENE NPH QL NAA+PROBE: NOT DETECTED

## 2022-02-07 RX ORDER — CIPROFLOXACIN HYDROCHLORIDE 500 MG/1
500 TABLET, FILM COATED ORAL
Qty: 14 | Refills: 0 | Status: ACTIVE | COMMUNITY
Start: 2022-02-07 | End: 1900-01-01

## 2022-02-08 ENCOUNTER — TRANSCRIPTION ENCOUNTER (OUTPATIENT)
Age: 82
End: 2022-02-08

## 2022-02-09 ENCOUNTER — APPOINTMENT (OUTPATIENT)
Dept: SURGERY | Facility: HOSPITAL | Age: 82
End: 2022-02-09

## 2022-02-09 ENCOUNTER — OUTPATIENT (OUTPATIENT)
Dept: OUTPATIENT SERVICES | Facility: HOSPITAL | Age: 82
LOS: 1 days | End: 2022-02-09
Payer: MEDICARE

## 2022-02-09 ENCOUNTER — RESULT REVIEW (OUTPATIENT)
Age: 82
End: 2022-02-09

## 2022-02-09 VITALS
HEIGHT: 71 IN | SYSTOLIC BLOOD PRESSURE: 137 MMHG | RESPIRATION RATE: 17 BRPM | DIASTOLIC BLOOD PRESSURE: 68 MMHG | TEMPERATURE: 99 F | WEIGHT: 158.07 LBS | HEART RATE: 100 BPM | OXYGEN SATURATION: 100 %

## 2022-02-09 VITALS
HEART RATE: 78 BPM | DIASTOLIC BLOOD PRESSURE: 71 MMHG | SYSTOLIC BLOOD PRESSURE: 131 MMHG | OXYGEN SATURATION: 96 % | TEMPERATURE: 99 F | RESPIRATION RATE: 16 BRPM

## 2022-02-09 DIAGNOSIS — Z98.49 CATARACT EXTRACTION STATUS, UNSPECIFIED EYE: Chronic | ICD-10-CM

## 2022-02-09 DIAGNOSIS — K40.90 UNILATERAL INGUINAL HERNIA, WITHOUT OBSTRUCTION OR GANGRENE, NOT SPECIFIED AS RECURRENT: ICD-10-CM

## 2022-02-09 DIAGNOSIS — Z98.890 OTHER SPECIFIED POSTPROCEDURAL STATES: Chronic | ICD-10-CM

## 2022-02-09 DIAGNOSIS — Z98.89 OTHER SPECIFIED POSTPROCEDURAL STATES: Chronic | ICD-10-CM

## 2022-02-09 DIAGNOSIS — I73.9 PERIPHERAL VASCULAR DISEASE, UNSPECIFIED: Chronic | ICD-10-CM

## 2022-02-09 LAB — BLD GP AB SCN SERPL QL: SIGNIFICANT CHANGE UP

## 2022-02-09 PROCEDURE — 19125 EXCISION BREAST LESION: CPT

## 2022-02-09 PROCEDURE — 49505 PRP I/HERN INIT REDUC >5 YR: CPT

## 2022-02-09 PROCEDURE — 86850 RBC ANTIBODY SCREEN: CPT

## 2022-02-09 PROCEDURE — 88304 TISSUE EXAM BY PATHOLOGIST: CPT

## 2022-02-09 PROCEDURE — C1781: CPT

## 2022-02-09 PROCEDURE — 88304 TISSUE EXAM BY PATHOLOGIST: CPT | Mod: 26

## 2022-02-09 PROCEDURE — 36415 COLL VENOUS BLD VENIPUNCTURE: CPT

## 2022-02-09 PROCEDURE — 86901 BLOOD TYPING SEROLOGIC RH(D): CPT

## 2022-02-09 PROCEDURE — 86900 BLOOD TYPING SEROLOGIC ABO: CPT

## 2022-02-09 DEVICE — MESH HERNIA PLUG PERFIX MED 1.3X1.55IN: Type: IMPLANTABLE DEVICE | Status: FUNCTIONAL

## 2022-02-09 DEVICE — MESH HERNIA PLUG PERFIX XL 1.6X2IN: Type: IMPLANTABLE DEVICE | Status: FUNCTIONAL

## 2022-02-09 DEVICE — MESH HERNIA MARLEX 3X6IN: Type: IMPLANTABLE DEVICE | Status: FUNCTIONAL

## 2022-02-09 DEVICE — MESH HERNIA PLUG PERFIX LG 1.6X1.9IN: Type: IMPLANTABLE DEVICE | Status: FUNCTIONAL

## 2022-02-09 RX ORDER — CILOSTAZOL 100 MG/1
1 TABLET ORAL
Qty: 0 | Refills: 0 | DISCHARGE

## 2022-02-09 RX ORDER — FENTANYL CITRATE 50 UG/ML
50 INJECTION INTRAVENOUS
Refills: 0 | Status: DISCONTINUED | OUTPATIENT
Start: 2022-02-09 | End: 2022-02-09

## 2022-02-09 RX ORDER — OXYCODONE HYDROCHLORIDE 5 MG/1
1 TABLET ORAL
Qty: 5 | Refills: 0
Start: 2022-02-09 | End: 2022-02-10

## 2022-02-09 RX ORDER — TAMSULOSIN HYDROCHLORIDE 0.4 MG/1
1 CAPSULE ORAL
Qty: 0 | Refills: 0 | DISCHARGE

## 2022-02-09 RX ORDER — ELVITEGRAVIR, COBICISTAT, EMTRICITABINE, AND TENOFOVIR ALAFENAMIDE 150; 150; 200; 10 MG/1; MG/1; MG/1; MG/1
1 TABLET ORAL
Qty: 0 | Refills: 0 | DISCHARGE

## 2022-02-09 RX ORDER — CLOPIDOGREL BISULFATE 75 MG/1
1 TABLET, FILM COATED ORAL
Qty: 0 | Refills: 0 | DISCHARGE

## 2022-02-09 RX ORDER — SODIUM CHLORIDE 9 MG/ML
1000 INJECTION, SOLUTION INTRAVENOUS
Refills: 0 | Status: DISCONTINUED | OUTPATIENT
Start: 2022-02-09 | End: 2022-02-09

## 2022-02-09 RX ORDER — FENTANYL CITRATE 50 UG/ML
25 INJECTION INTRAVENOUS
Refills: 0 | Status: DISCONTINUED | OUTPATIENT
Start: 2022-02-09 | End: 2022-02-09

## 2022-02-09 RX ORDER — OXYCODONE AND ACETAMINOPHEN 5; 325 MG/1; MG/1
1 TABLET ORAL EVERY 6 HOURS
Refills: 0 | Status: COMPLETED | OUTPATIENT
Start: 2022-02-09 | End: 2022-02-16

## 2022-02-09 RX ORDER — FINASTERIDE 5 MG/1
1 TABLET, FILM COATED ORAL
Qty: 0 | Refills: 0 | DISCHARGE

## 2022-02-09 RX ADMIN — FENTANYL CITRATE 50 MICROGRAM(S): 50 INJECTION INTRAVENOUS at 16:50

## 2022-02-09 RX ADMIN — FENTANYL CITRATE 50 MICROGRAM(S): 50 INJECTION INTRAVENOUS at 16:35

## 2022-02-09 NOTE — ASU DISCHARGE PLAN (ADULT/PEDIATRIC) - CARE PROVIDER_API CALL
Tristen Page (MD)  Surgery  [address] [place], [place] [id number]  Phone: [phone]  Fax: [phone]  Follow Up Time: 1 week

## 2022-02-09 NOTE — ASU DISCHARGE PLAN (ADULT/PEDIATRIC) - NS MD DC FALL RISK RISK
For information on Fall & Injury Prevention, visit: [email or web address] OR  [email or web address] OR  https://www.cdc.gov/steadi/patient.html

## 2022-02-09 NOTE — ASU DISCHARGE PLAN (ADULT/PEDIATRIC) - ASU DC SPECIAL INSTRUCTIONSFT
For pain take over the counter Tylenol every 6 hours. If pain is not controlled take oxycodone as prescribed. Remove surgical dressing 48 hours after your surgery. Underneath the dressing will be Steri-strips, do not remove as they are to fall off on their own. Do not lift anything heavier than 15 lbs or perform strenuous exercise  for 4 weeks.  You may shower, allow soap and water to rinse down surgical site, do not scrub. Resume your normal diet. For pain take over the counter Tylenol every 6 hours. If pain is not controlled take oxycodone as prescribed. Remove surgical dressing 48 hours after your surgery. Underneath the dressing will be Steri-strips, do not remove as they are to fall off on their own. Do not lift anything heavier than 15 lbs or perform strenuous exercise  for 4 weeks.  You may shower, allow soap and water to rinse down surgical site, do not scrub. Resume your normal diet.    Please hold plavix for 3 days postoperatively. Resume after 3 days if no active bleeding is noted from wound.

## 2022-02-14 PROBLEM — K40.90 UNILATERAL INGUINAL HERNIA, WITHOUT OBSTRUCTION OR GANGRENE, NOT SPECIFIED AS RECURRENT: Chronic | Status: ACTIVE | Noted: 2022-01-20

## 2022-02-15 LAB — SURGICAL PATHOLOGY STUDY: SIGNIFICANT CHANGE UP

## 2022-02-17 ENCOUNTER — APPOINTMENT (OUTPATIENT)
Dept: SURGERY | Facility: CLINIC | Age: 82
End: 2022-02-17
Payer: COMMERCIAL

## 2022-02-17 VITALS — TEMPERATURE: 96.6 F

## 2022-02-17 DIAGNOSIS — K40.90 UNILATERAL INGUINAL HERNIA, W/OUT OBSTRUCTION OR GANGRENE, NOT SPECIFIED AS RECURRENT: ICD-10-CM

## 2022-02-17 PROCEDURE — 99024 POSTOP FOLLOW-UP VISIT: CPT

## 2022-02-17 NOTE — HISTORY OF PRESENT ILLNESS
[de-identified] : BABAK ROSENTHAL presents to the office for postoperative visit today, he is S/P repair of left inguinal hernia with mesh, 02/09/22. Patient without reported complaints. Denies urinary symptoms. No pain. No fevers/chills. \par \par

## 2022-02-17 NOTE — REASON FOR VISIT
[Post Op: _________] : a [unfilled] post op visit [FreeTextEntry1] : S/P repair of left inguinal hernia with mesh, 02/09/22

## 2022-02-17 NOTE — ASSESSMENT
[FreeTextEntry1] : BABAK ROSENTHAL presents to the office for postoperative visit today, he is S/P repair of left inguinal hernia with mesh, 02/09/22. Patient without reported complaints. \par Incision site is healing well and as expected. There is no evidence of infection/complication, and patient is progressing as expected. Post-operative wound care, activities, restrictions and precautions were reinforced. \par

## 2022-02-17 NOTE — REVIEW OF SYSTEMS
[Fever] : no fever [Chills] : no chills [Feeling Poorly] : not feeling poorly [Dysuria] : no dysuria [Hesitancy] : no urinary hesitancy [Genital Lesion] : no genital lesions [Testicular Pain] : no testicular pain [Skin Lesions] : no skin lesions [Skin Wound] : no skin wound [Dizziness] : no dizziness [Anxiety] : no anxiety [FreeTextEntry8] : tenderness to incision site

## 2022-02-17 NOTE — CONSULT LETTER
[Dear  ___] : Dear  [unfilled], [Consult Letter:] : I had the pleasure of evaluating your patient, [unfilled]. [Consult Closing:] : Thank you very much for allowing me to participate in the care of this patient.  If you have any questions, please do not hesitate to contact me. [Sincerely,] : Sincerely, [FreeTextEntry3] : Tristen Page MD

## 2022-02-17 NOTE — PHYSICAL EXAM
[No Rash or Lesion] : No rash or lesion [Alert] : alert [Oriented to Person] : oriented to person [Oriented to Place] : oriented to place [Oriented to Time] : oriented to time [Calm] : calm [de-identified] : A/Ox3; NAD. appears comfortable [de-identified] : EOMI; sclera anicteric. Nasal mucosa pink, septum midline. Oral mucosa pink. Tongue midline, Pharynx without exudates. [de-identified] : abd is soft, NT/ND\par  [de-identified] : wound healing well with no hematoma, erythema or drainage; No evidence of infection

## 2022-05-05 ENCOUNTER — APPOINTMENT (OUTPATIENT)
Dept: UROLOGY | Facility: CLINIC | Age: 82
End: 2022-05-05
Payer: MEDICARE

## 2022-05-05 VITALS — DIASTOLIC BLOOD PRESSURE: 60 MMHG | RESPIRATION RATE: 15 BRPM | SYSTOLIC BLOOD PRESSURE: 105 MMHG | HEART RATE: 95 BPM

## 2022-05-05 DIAGNOSIS — N13.8 BENIGN PROSTATIC HYPERPLASIA WITH LOWER URINARY TRACT SYMPMS: ICD-10-CM

## 2022-05-05 DIAGNOSIS — N40.1 BENIGN PROSTATIC HYPERPLASIA WITH LOWER URINARY TRACT SYMPMS: ICD-10-CM

## 2022-05-05 DIAGNOSIS — R97.20 ELEVATED PROSTATE, SPECIFIC ANTIGEN [PSA]: ICD-10-CM

## 2022-05-05 DIAGNOSIS — N52.9 MALE ERECTILE DYSFUNCTION, UNSPECIFIED: ICD-10-CM

## 2022-05-05 PROCEDURE — 99214 OFFICE O/P EST MOD 30 MIN: CPT

## 2022-05-05 RX ORDER — TAMSULOSIN HYDROCHLORIDE 0.4 MG/1
0.4 CAPSULE ORAL
Qty: 90 | Refills: 1 | Status: ACTIVE | COMMUNITY
Start: 2019-09-19 | End: 1900-01-01

## 2022-05-05 RX ORDER — FINASTERIDE 5 MG/1
5 TABLET, FILM COATED ORAL
Qty: 90 | Refills: 1 | Status: ACTIVE | COMMUNITY
Start: 2021-01-27 | End: 1900-01-01

## 2022-05-09 LAB — PSA SERPL-MCNC: 2.58 NG/ML

## 2022-05-17 NOTE — ASSESSMENT
[FreeTextEntry1] : con tamsulosin finasteride\par must reduce caffeine\par \par check psa\par \par sildenafil as needed

## 2024-10-12 ENCOUNTER — INPATIENT (INPATIENT)
Facility: HOSPITAL | Age: 84
LOS: 9 days | Discharge: NURSING, REHAB OR LONG-TERM CARE | DRG: 974 | End: 2024-10-22
Attending: STUDENT IN AN ORGANIZED HEALTH CARE EDUCATION/TRAINING PROGRAM | Admitting: STUDENT IN AN ORGANIZED HEALTH CARE EDUCATION/TRAINING PROGRAM
Payer: MEDICARE

## 2024-10-12 VITALS
OXYGEN SATURATION: 100 % | HEART RATE: 110 BPM | DIASTOLIC BLOOD PRESSURE: 85 MMHG | RESPIRATION RATE: 20 BRPM | WEIGHT: 160.94 LBS | SYSTOLIC BLOOD PRESSURE: 132 MMHG

## 2024-10-12 DIAGNOSIS — N40.0 BENIGN PROSTATIC HYPERPLASIA WITHOUT LOWER URINARY TRACT SYMPTOMS: ICD-10-CM

## 2024-10-12 DIAGNOSIS — R65.10 SYSTEMIC INFLAMMATORY RESPONSE SYNDROME (SIRS) OF NON-INFECTIOUS ORIGIN WITHOUT ACUTE ORGAN DYSFUNCTION: ICD-10-CM

## 2024-10-12 DIAGNOSIS — R79.89 OTHER SPECIFIED ABNORMAL FINDINGS OF BLOOD CHEMISTRY: ICD-10-CM

## 2024-10-12 DIAGNOSIS — Z98.49 CATARACT EXTRACTION STATUS, UNSPECIFIED EYE: Chronic | ICD-10-CM

## 2024-10-12 DIAGNOSIS — Z29.9 ENCOUNTER FOR PROPHYLACTIC MEASURES, UNSPECIFIED: ICD-10-CM

## 2024-10-12 DIAGNOSIS — Z98.89 OTHER SPECIFIED POSTPROCEDURAL STATES: Chronic | ICD-10-CM

## 2024-10-12 DIAGNOSIS — D69.6 THROMBOCYTOPENIA, UNSPECIFIED: ICD-10-CM

## 2024-10-12 DIAGNOSIS — B20 HUMAN IMMUNODEFICIENCY VIRUS [HIV] DISEASE: ICD-10-CM

## 2024-10-12 DIAGNOSIS — Z98.890 OTHER SPECIFIED POSTPROCEDURAL STATES: Chronic | ICD-10-CM

## 2024-10-12 DIAGNOSIS — I73.9 PERIPHERAL VASCULAR DISEASE, UNSPECIFIED: Chronic | ICD-10-CM

## 2024-10-12 DIAGNOSIS — J69.0 PNEUMONITIS DUE TO INHALATION OF FOOD AND VOMIT: ICD-10-CM

## 2024-10-12 DIAGNOSIS — N17.9 ACUTE KIDNEY FAILURE, UNSPECIFIED: ICD-10-CM

## 2024-10-12 LAB
ALBUMIN SERPL ELPH-MCNC: 2.1 G/DL — LOW (ref 3.5–5)
ALP SERPL-CCNC: 68 U/L — SIGNIFICANT CHANGE UP (ref 40–120)
ALT FLD-CCNC: 51 U/L DA — SIGNIFICANT CHANGE UP (ref 10–60)
ANION GAP SERPL CALC-SCNC: 8 MMOL/L — SIGNIFICANT CHANGE UP (ref 5–17)
APPEARANCE UR: ABNORMAL
APTT BLD: 33.3 SEC — SIGNIFICANT CHANGE UP (ref 24.5–35.6)
AST SERPL-CCNC: 76 U/L — HIGH (ref 10–40)
BACTERIA # UR AUTO: ABNORMAL /HPF
BASE EXCESS BLDA CALC-SCNC: -2.8 MMOL/L — LOW (ref -2–3)
BASOPHILS # BLD AUTO: 0.04 K/UL — SIGNIFICANT CHANGE UP (ref 0–0.2)
BASOPHILS NFR BLD AUTO: 0.3 % — SIGNIFICANT CHANGE UP (ref 0–2)
BILIRUB SERPL-MCNC: 1.1 MG/DL — SIGNIFICANT CHANGE UP (ref 0.2–1.2)
BILIRUB UR-MCNC: NEGATIVE — SIGNIFICANT CHANGE UP
BLOOD GAS COMMENTS ARTERIAL: SIGNIFICANT CHANGE UP
BUN SERPL-MCNC: 39 MG/DL — HIGH (ref 7–18)
CALCIUM SERPL-MCNC: 9.1 MG/DL — SIGNIFICANT CHANGE UP (ref 8.4–10.5)
CHLORIDE SERPL-SCNC: 115 MMOL/L — HIGH (ref 96–108)
CK SERPL-CCNC: 515 U/L — HIGH (ref 35–232)
CO2 SERPL-SCNC: 24 MMOL/L — SIGNIFICANT CHANGE UP (ref 22–31)
COLOR SPEC: SIGNIFICANT CHANGE UP
CREAT SERPL-MCNC: 1.26 MG/DL — SIGNIFICANT CHANGE UP (ref 0.5–1.3)
DIFF PNL FLD: ABNORMAL
EGFR: 56 ML/MIN/1.73M2 — LOW
EOSINOPHIL # BLD AUTO: 0.47 K/UL — SIGNIFICANT CHANGE UP (ref 0–0.5)
EOSINOPHIL NFR BLD AUTO: 3.5 % — SIGNIFICANT CHANGE UP (ref 0–6)
EPI CELLS # UR: PRESENT
FLUAV AG NPH QL: SIGNIFICANT CHANGE UP
FLUBV AG NPH QL: SIGNIFICANT CHANGE UP
GAS PNL BLDA: SIGNIFICANT CHANGE UP
GAS PNL BLDV: SIGNIFICANT CHANGE UP
GLUCOSE SERPL-MCNC: 64 MG/DL — LOW (ref 70–99)
GLUCOSE UR QL: NEGATIVE MG/DL — SIGNIFICANT CHANGE UP
GRAN CASTS # UR COMP ASSIST: PRESENT
HCO3 BLDA-SCNC: 21 MMOL/L — SIGNIFICANT CHANGE UP (ref 21–28)
HCT VFR BLD CALC: 48.8 % — SIGNIFICANT CHANGE UP (ref 39–50)
HGB BLD-MCNC: 16.3 G/DL — SIGNIFICANT CHANGE UP (ref 13–17)
HOROWITZ INDEX BLDA+IHG-RTO: 30 — SIGNIFICANT CHANGE UP
IMM GRANULOCYTES NFR BLD AUTO: 0.5 % — SIGNIFICANT CHANGE UP (ref 0–0.9)
INR BLD: 1.17 RATIO — HIGH (ref 0.85–1.16)
KETONES UR-MCNC: 15 MG/DL
LACTATE SERPL-SCNC: 2.3 MMOL/L — HIGH (ref 0.7–2)
LACTATE SERPL-SCNC: 2.9 MMOL/L — HIGH (ref 0.7–2)
LEUKOCYTE ESTERASE UR-ACNC: NEGATIVE — SIGNIFICANT CHANGE UP
LYMPHOCYTES # BLD AUTO: 0.6 K/UL — LOW (ref 1–3.3)
LYMPHOCYTES # BLD AUTO: 4.5 % — LOW (ref 13–44)
MCHC RBC-ENTMCNC: 32.4 PG — SIGNIFICANT CHANGE UP (ref 27–34)
MCHC RBC-ENTMCNC: 33.4 GM/DL — SIGNIFICANT CHANGE UP (ref 32–36)
MCV RBC AUTO: 97 FL — SIGNIFICANT CHANGE UP (ref 80–100)
MONOCYTES # BLD AUTO: 0.78 K/UL — SIGNIFICANT CHANGE UP (ref 0–0.9)
MONOCYTES NFR BLD AUTO: 5.9 % — SIGNIFICANT CHANGE UP (ref 2–14)
NEUTROPHILS # BLD AUTO: 11.36 K/UL — HIGH (ref 1.8–7.4)
NEUTROPHILS NFR BLD AUTO: 85.3 % — HIGH (ref 43–77)
NITRITE UR-MCNC: NEGATIVE — SIGNIFICANT CHANGE UP
NRBC # BLD: 0 /100 WBCS — SIGNIFICANT CHANGE UP (ref 0–0)
PCO2 BLDA: 32 MMHG — LOW (ref 35–48)
PH BLDA: 7.42 — SIGNIFICANT CHANGE UP (ref 7.35–7.45)
PH UR: 5.5 — SIGNIFICANT CHANGE UP (ref 5–8)
PLATELET # BLD AUTO: 82 K/UL — LOW (ref 150–400)
PO2 BLDA: 73 MMHG — LOW (ref 83–108)
POTASSIUM SERPL-MCNC: 4.6 MMOL/L — SIGNIFICANT CHANGE UP (ref 3.5–5.3)
POTASSIUM SERPL-SCNC: 4.6 MMOL/L — SIGNIFICANT CHANGE UP (ref 3.5–5.3)
PROT SERPL-MCNC: 5.3 G/DL — LOW (ref 6–8.3)
PROT UR-MCNC: 100 MG/DL
PROTHROM AB SERPL-ACNC: 13.7 SEC — HIGH (ref 9.9–13.4)
RBC # BLD: 5.03 M/UL — SIGNIFICANT CHANGE UP (ref 4.2–5.8)
RBC # FLD: 14.9 % — HIGH (ref 10.3–14.5)
RBC CASTS # UR COMP ASSIST: 8 /HPF — HIGH (ref 0–4)
SAO2 % BLDA: 96 % — SIGNIFICANT CHANGE UP
SARS-COV-2 RNA SPEC QL NAA+PROBE: SIGNIFICANT CHANGE UP
SODIUM SERPL-SCNC: 147 MMOL/L — HIGH (ref 135–145)
SP GR SPEC: 1.02 — SIGNIFICANT CHANGE UP (ref 1–1.03)
TROPONIN I, HIGH SENSITIVITY RESULT: 538.3 NG/L — HIGH
TROPONIN I, HIGH SENSITIVITY RESULT: 550.6 NG/L — HIGH
UROBILINOGEN FLD QL: 1 MG/DL — SIGNIFICANT CHANGE UP (ref 0.2–1)
WBC # BLD: 13.31 K/UL — HIGH (ref 3.8–10.5)
WBC # FLD AUTO: 13.31 K/UL — HIGH (ref 3.8–10.5)
WBC UR QL: 3 /HPF — SIGNIFICANT CHANGE UP (ref 0–5)

## 2024-10-12 PROCEDURE — 70450 CT HEAD/BRAIN W/O DYE: CPT | Mod: 26,MC

## 2024-10-12 PROCEDURE — 93010 ELECTROCARDIOGRAM REPORT: CPT

## 2024-10-12 PROCEDURE — 71045 X-RAY EXAM CHEST 1 VIEW: CPT | Mod: 26

## 2024-10-12 PROCEDURE — 99285 EMERGENCY DEPT VISIT HI MDM: CPT

## 2024-10-12 PROCEDURE — 99223 1ST HOSP IP/OBS HIGH 75: CPT

## 2024-10-12 RX ORDER — PIPERACILLIN AND TAZOBACTAM .5; 4 G/20ML; G/20ML
3.38 INJECTION, POWDER, LYOPHILIZED, FOR SOLUTION INTRAVENOUS ONCE
Refills: 0 | Status: COMPLETED | OUTPATIENT
Start: 2024-10-12 | End: 2024-10-12

## 2024-10-12 RX ORDER — MELATONIN 5 MG
3 TABLET ORAL AT BEDTIME
Refills: 0 | Status: DISCONTINUED | OUTPATIENT
Start: 2024-10-12 | End: 2024-10-22

## 2024-10-12 RX ORDER — ROSUVASTATIN CALCIUM 10 MG
20 TABLET ORAL AT BEDTIME
Refills: 0 | Status: DISCONTINUED | OUTPATIENT
Start: 2024-10-12 | End: 2024-10-22

## 2024-10-12 RX ORDER — MAGNESIUM, ALUMINUM HYDROXIDE 200-200 MG
30 TABLET,CHEWABLE ORAL EVERY 4 HOURS
Refills: 0 | Status: DISCONTINUED | OUTPATIENT
Start: 2024-10-12 | End: 2024-10-22

## 2024-10-12 RX ORDER — CILOSTAZOL 100 MG/1
100 TABLET ORAL
Refills: 0 | Status: DISCONTINUED | OUTPATIENT
Start: 2024-10-12 | End: 2024-10-15

## 2024-10-12 RX ORDER — TAMSULOSIN HCL 0.4 MG
0.4 CAPSULE ORAL AT BEDTIME
Refills: 0 | Status: DISCONTINUED | OUTPATIENT
Start: 2024-10-12 | End: 2024-10-22

## 2024-10-12 RX ORDER — AZITHROMYCIN DIHYDRATE 200 MG/5ML
500 POWDER, FOR SUSPENSION ORAL ONCE
Refills: 0 | Status: COMPLETED | OUTPATIENT
Start: 2024-10-12 | End: 2024-10-12

## 2024-10-12 RX ORDER — SODIUM CHLORIDE 9 MG/ML
1000 INJECTION, SOLUTION INTRAMUSCULAR; INTRAVENOUS; SUBCUTANEOUS
Refills: 0 | Status: DISCONTINUED | OUTPATIENT
Start: 2024-10-12 | End: 2024-10-15

## 2024-10-12 RX ORDER — CEFTRIAXONE SODIUM 10 G
1000 VIAL (EA) INJECTION EVERY 24 HOURS
Refills: 0 | Status: DISCONTINUED | OUTPATIENT
Start: 2024-10-13 | End: 2024-10-14

## 2024-10-12 RX ORDER — SODIUM CHLORIDE 9 MG/ML
1000 INJECTION, SOLUTION INTRAMUSCULAR; INTRAVENOUS; SUBCUTANEOUS ONCE
Refills: 0 | Status: COMPLETED | OUTPATIENT
Start: 2024-10-12 | End: 2024-10-12

## 2024-10-12 RX ORDER — NIFEDIPINE 90 MG
1 TABLET, EXTENDED RELEASE 24 HR ORAL
Refills: 0 | DISCHARGE

## 2024-10-12 RX ORDER — CLOPIDOGREL 75 MG/1
75 TABLET ORAL DAILY
Refills: 0 | Status: DISCONTINUED | OUTPATIENT
Start: 2024-10-12 | End: 2024-10-15

## 2024-10-12 RX ORDER — ONDANSETRON HYDROCHLORIDE 2 MG/ML
4 INJECTION, SOLUTION INTRAMUSCULAR; INTRAVENOUS EVERY 8 HOURS
Refills: 0 | Status: DISCONTINUED | OUTPATIENT
Start: 2024-10-12 | End: 2024-10-22

## 2024-10-12 RX ORDER — NIFEDIPINE 90 MG
30 TABLET, EXTENDED RELEASE 24 HR ORAL DAILY
Refills: 0 | Status: DISCONTINUED | OUTPATIENT
Start: 2024-10-12 | End: 2024-10-15

## 2024-10-12 RX ADMIN — AZITHROMYCIN DIHYDRATE 255 MILLIGRAM(S): 200 POWDER, FOR SUSPENSION ORAL at 21:00

## 2024-10-12 RX ADMIN — SODIUM CHLORIDE 1000 MILLILITER(S): 9 INJECTION, SOLUTION INTRAMUSCULAR; INTRAVENOUS; SUBCUTANEOUS at 22:52

## 2024-10-12 RX ADMIN — Medication 1500 MILLILITER(S): at 16:34

## 2024-10-12 RX ADMIN — PIPERACILLIN AND TAZOBACTAM 200 GRAM(S): .5; 4 INJECTION, POWDER, LYOPHILIZED, FOR SOLUTION INTRAVENOUS at 18:27

## 2024-10-12 RX ADMIN — Medication 1500 MILLILITER(S): at 15:22

## 2024-10-12 RX ADMIN — PIPERACILLIN AND TAZOBACTAM 3.38 GRAM(S): .5; 4 INJECTION, POWDER, LYOPHILIZED, FOR SOLUTION INTRAVENOUS at 18:57

## 2024-10-12 NOTE — H&P ADULT - HISTORY OF PRESENT ILLNESS
84-year-old man from home with hypertension, PVD, BPH, HIV (on Genvoya??) coming with his daughter after she found him on the floor.  She had been trying to get in touch with him for 3 days but was unable so came up to [place] from [place], where she had recently moved, and found him on the floor covered in feces and urine.  He was confused at the time.  He does not recall details about how he got to the floor.  Per daughter, he is independent and mentate as well at baseline.  on exam, he appears to be A&O x 3 but he is very difficult to understand.  He appears very dry and sounds like he is swallowing his tongue.  His daughter has difficulty understanding him as well.    Exam is notable for very dry appearance, garbled speech, rhonchi on all anterior lung fields, saturating 88% on room air and requiring 3 L nasal cannula, bilateral lower extremity pitting edema with old and new wounds on the legs, and an abrasion on the forehead.     ED Course    Vitals: HRmax 115, O2 88% on RA, requiring 3L NC  Labs: WBC 13, plt 82, trops 550>538, lactate 2.3>2.9, BUN/Cr 39/1.26, gluc 64, , UA with RBC, granular casts, protein  Intervention: s/p zosyn, 2.5L IVF  Consults:   Images: CTH wnl; CXR with bl LL consolidations on wet read        84-year-old man from home with hypertension, PVD, BPH, HIV (on Genvoya??) coming with his daughter after she found him on the floor.  She had been trying to get in touch with him for 3 days but was unable so came up to [place] from [place], where she had recently moved, and found him on the floor covered in feces and urine.  He was confused at the time.  He does not recall details about how he got to the floor.  Per daughter, he is independent and mentate as well at baseline. On exam, he appears to be A&O x 3 but he is very difficult to understand.  He appears very dry and sounds like he is swallowing his tongue.  His daughter has difficulty understanding him as well.    Exam is notable for very dry appearance, garbled speech, rhonchi on all anterior lung fields, saturating 88% on room air and requiring 3 L nasal cannula, bilateral lower extremity pitting edema with old and new wounds on the legs, and an abrasion on the forehead.     ED Course    Vitals: HRmax 115, O2 88% on RA, requiring 3L NC  Labs: WBC 13, plt 82, trops 550>538, lactate 2.3>2.9, BUN/Cr 39/1.26, gluc 64, , UA with RBC, granular casts, protein  Intervention: s/p zosyn, 2.5L IVF  Consults:   Images: CTH wnl; CXR with bl LL consolidations on wet read

## 2024-10-12 NOTE — ED PROVIDER NOTE - CLINICAL SUMMARY MEDICAL DECISION MAKING FREE TEXT BOX
84-year-old male is brought in by EMS it is reported that the patient was found on the floor.  Daughter states that they were trying to get in touch with her father for 3 days he would not answer the phone and then he was unable to open the door.  She flew up from [place] and the door had to be taken down he was on the floor likely for 3 days.  Patient is confused.  Cannot tell the details about how he landed up on the floor.  Situation at home patient unkempt found sitting in urine and stool.   tachycardia   mucous membranes dry  left periorbital ecchymosis   moves all extremities lungs rales   abdomen soft nonteder   rle cellulitis    redness

## 2024-10-12 NOTE — ED PROVIDER NOTE - OBJECTIVE STATEMENT
84-year-old male is brought in by EMS it is reported that the patient was found on the floor.  Daughter states that they were trying to get in touch with her father for 3 days he would not answer the phone and then he was unable to open the door.  She flew up from [place] and the door had to be taken down he was on the floor likely for 3 days.  Patient is confused.  Cannot tell the details about how he landed up on the floor.  Situation at home patient unkempt found sitting in urine and stool.

## 2024-10-12 NOTE — H&P ADULT - PROBLEM SELECTOR PLAN 1
HR max 115, requiring 3L nasal cannula, white count 13, plt 82, lactate 2.3>2.9  Source likely aspiration pneumonia  UA negative  Received Zosyn in the ER  CXR with bilateral infiltrates     Will continue with ceftriaxone and azithromycin  Follow-up blood cultures HR max 115, requiring 3L nasal cannula, white count 13, plt 82, lactate 2.3>2.9  Source likely aspiration pneumonia  UA negative  Received Zosyn in the ER  CXR with bilateral infiltrates     Will continue with ceftriaxone and azithromycin  Follow-up blood cultures  f/u legionella, strep pneumo, mycoplasma   f/u lactate

## 2024-10-12 NOTE — H&P ADULT - NSHPPHYSICALEXAM_GEN_ALL_CORE
Gen: AOx3, NAD, appears disheveled, acutely ill, very difficult to understand  HEAD:  +dry abrasion on forehead  EYES: +L eye edema/contusion but nontender  ENT: +Dry mucous membranes  NECK: Supple, No JVD  CV: S1+S2 normal, no murmurs   Resp +bl anterior ronchi, requiring 3L NC  Abd: Soft, nontender, +BS  Ext: +bl pitting edema, old and new scabs  IV/Skin: +old and new abrasions/lesions on legs   Msk: No joint swelling  Neuro: AAOx3. very difficult to understand speech   Psych: no anxiety, no delusional ideas, no suicidal ideation

## 2024-10-12 NOTE — ED ADULT NURSE NOTE - NSFALLRISKINTERV_ED_ALL_ED

## 2024-10-12 NOTE — H&P ADULT - ASSESSMENT
84-year-old man with hypertension, HIV, BPH coming after his daughter found him on the floor.  She had not been able to get in touch with him for 3 days.  he meets SIRS criteria on admission and likely has aspiration pneumonia.  He also has mild rhabdo.  84-year-old man with hypertension, HIV, BPH coming after his daughter found him on the floor.  She had not been able to get in touch with him for 3 days.  he meets SIRS criteria on admission and likely has aspiration pneumonia.  He also has mild rhabdo.      MED REC TO BE COMPLETED IN AM

## 2024-10-12 NOTE — H&P ADULT - ATTENDING COMMENTS
83yo m w pmh htn, hld, pad s/p le stents, bph, hiv, p/w ams following presumed unwitnessed fall; in er, found to be meeting severe sirs/sepsis criteria + in ahrf req 3 lpm via nc; suspect 2/2 aspiration iso fall, dehydration; admit to medicine for further mgmt.    Severe sirs/sepsis  Leukocytosis, tachycardic + lactic acidosis; meets severe  otherwise, afebrile and hds  s/p 2.5 L ivf + zosyn in ER  trend lactate q4-6h until wnl  follow up blood cultures  Monitor for fever, changes in white count  broad spec empirical abx therapy as described below  ivf resusci + lytes as needed.    Ahrf 2/2 pna  currently in no respiratory distress, w adequate spo2 at room air  cxr pending final read, but appears to be showing rll opacities/consolidation/infiltrate  s/p zosyn in er  follow up full rvp, mrsa screen, atypical pna work up, procal, sputum cultures, cxr final read  Monitor SpO2, RR, for signs of respiratory distress; Goal SpO2 >88-92%, PaO2 >55-60 mmHg  empirical abx with Rocephin + zithromax for now  bronchodilators + oxygen supplementation as needed  aggressive pulmonary toilet/hygiene  symptomatic relief with antitussives, mucolytics, antipyretics  aspiration precautions with head end of bed elevated in the mean time    Dehydration/rhabdomyolysis  hyperNa, hyperCl, brennan, lactic acidosis  cpk ~515; ua w mod blood, rbc ~8; a/w elevated trop (demand/decreased clearance/rhabdo; otherwise, no clinft of acs; ekg with no st seg - t wave changes suggestive of acute ischemia) + transaminitis  s/p 2.5 L IVF in er  follow up urine studies  Monitor UO, BUN/Cr, volume status, acid-base balance, electrolytes  trend na q4-6h until wnl; goal rate of correction ~8 meq/l/day  trend lactate q4-6h until wnl  avoid nephrotoxic agents; appropriate dose adjustments for all renally cleared medications  IVF resusci + electrolyte supplementation as needed     Fall  h/o pad s/p le stents  neuroimaging shows chronic microvascular ischemic changes  ekg with no new st seg - t wave changes suggestive of acute ischemia  follow up tsh, folate, b12, rpr; orthostatic vitals, tte; full rvp  Monitor mental status with frequent neurochecks  monitor on telemetry  maintain fall, delirium, seizure, precautions  nutrition consult  pt/ot eval + sw/cm consult for disposition     Otherwise, concur w a+p as described by resident above

## 2024-10-12 NOTE — H&P ADULT - PROBLEM SELECTOR PLAN 3
on admission  Likely in the setting of dehydration  Received 2.5 L IVF in the ER  Continue with maintenance IVF 75 cc an hour

## 2024-10-12 NOTE — ED ADULT TRIAGE NOTE - CHIEF COMPLAINT QUOTE
found in the floor (last contact by family 3 days ago), R leg swelling/ redness, pt appears to be disheveled at ED arrival

## 2024-10-12 NOTE — H&P ADULT - PROBLEM SELECTOR PLAN 5
Per chart review, was diagnosed in the 90s  Old meds suggest he takes Genvoya, but it is not clear on surescripts  CD4 count and viral load unknown at this time  Follow-up CD4 and viral load next line continue with Genvoya and confirm meds in the morning

## 2024-10-12 NOTE — H&P ADULT - PROBLEM SELECTOR PLAN 4
likely in the setting of SIRS/infection  82 on admission  No signs of active bleeding  Transfuse if active bleeding at less than 50 or not bleeding at less than 10

## 2024-10-12 NOTE — ED PROVIDER NOTE - NSICDXPASTSURGICALHX_GEN_ALL_CORE_FT
PAST SURGICAL HISTORY:  History of cystoscopy with photovaporization, green light laser lithotripsy 2016    PVD (peripheral vascular disease) bilateral leg stents 2020    S/P cataract surgery     S/P hernia repair right inguinal - 2013

## 2024-10-13 DIAGNOSIS — R78.81 BACTEREMIA: ICD-10-CM

## 2024-10-13 LAB
-  BLOOD PCR PANEL: SIGNIFICANT CHANGE UP
-  BLOOD PCR PANEL: SIGNIFICANT CHANGE UP
ALBUMIN SERPL ELPH-MCNC: 1.9 G/DL — LOW (ref 3.5–5)
ALP SERPL-CCNC: 65 U/L — SIGNIFICANT CHANGE UP (ref 40–120)
ALT FLD-CCNC: 44 U/L DA — SIGNIFICANT CHANGE UP (ref 10–60)
ANION GAP SERPL CALC-SCNC: 10 MMOL/L — SIGNIFICANT CHANGE UP (ref 5–17)
AST SERPL-CCNC: 72 U/L — HIGH (ref 10–40)
BASOPHILS # BLD AUTO: 0.04 K/UL — SIGNIFICANT CHANGE UP (ref 0–0.2)
BASOPHILS NFR BLD AUTO: 0.4 % — SIGNIFICANT CHANGE UP (ref 0–2)
BILIRUB SERPL-MCNC: 1 MG/DL — SIGNIFICANT CHANGE UP (ref 0.2–1.2)
BUN SERPL-MCNC: 32 MG/DL — HIGH (ref 7–18)
CALCIUM SERPL-MCNC: 8.7 MG/DL — SIGNIFICANT CHANGE UP (ref 8.4–10.5)
CHLORIDE SERPL-SCNC: 118 MMOL/L — HIGH (ref 96–108)
CK SERPL-CCNC: 416 U/L — HIGH (ref 35–232)
CO2 SERPL-SCNC: 21 MMOL/L — LOW (ref 22–31)
CREAT SERPL-MCNC: 0.86 MG/DL — SIGNIFICANT CHANGE UP (ref 0.5–1.3)
EGFR: 85 ML/MIN/1.73M2 — SIGNIFICANT CHANGE UP
EOSINOPHIL # BLD AUTO: 0.01 K/UL — SIGNIFICANT CHANGE UP (ref 0–0.5)
EOSINOPHIL NFR BLD AUTO: 0.1 % — SIGNIFICANT CHANGE UP (ref 0–6)
GLUCOSE SERPL-MCNC: 77 MG/DL — SIGNIFICANT CHANGE UP (ref 70–99)
GRAM STN FLD: ABNORMAL
HCT VFR BLD CALC: 40.8 % — SIGNIFICANT CHANGE UP (ref 39–50)
HCT VFR BLD CALC: 42.4 % — SIGNIFICANT CHANGE UP (ref 39–50)
HGB BLD-MCNC: 13.7 G/DL — SIGNIFICANT CHANGE UP (ref 13–17)
HGB BLD-MCNC: 14.5 G/DL — SIGNIFICANT CHANGE UP (ref 13–17)
IMM GRANULOCYTES NFR BLD AUTO: 1.2 % — HIGH (ref 0–0.9)
LACTATE SERPL-SCNC: 1.9 MMOL/L — SIGNIFICANT CHANGE UP (ref 0.7–2)
LYMPHOCYTES # BLD AUTO: 0.72 K/UL — LOW (ref 1–3.3)
LYMPHOCYTES # BLD AUTO: 6.7 % — LOW (ref 13–44)
MAGNESIUM SERPL-MCNC: 2.3 MG/DL — SIGNIFICANT CHANGE UP (ref 1.6–2.6)
MCHC RBC-ENTMCNC: 32.4 PG — SIGNIFICANT CHANGE UP (ref 27–34)
MCHC RBC-ENTMCNC: 33 PG — SIGNIFICANT CHANGE UP (ref 27–34)
MCHC RBC-ENTMCNC: 33.6 GM/DL — SIGNIFICANT CHANGE UP (ref 32–36)
MCHC RBC-ENTMCNC: 34.2 GM/DL — SIGNIFICANT CHANGE UP (ref 32–36)
MCV RBC AUTO: 96.5 FL — SIGNIFICANT CHANGE UP (ref 80–100)
MCV RBC AUTO: 96.6 FL — SIGNIFICANT CHANGE UP (ref 80–100)
METHOD TYPE: SIGNIFICANT CHANGE UP
METHOD TYPE: SIGNIFICANT CHANGE UP
MONOCYTES # BLD AUTO: 0.62 K/UL — SIGNIFICANT CHANGE UP (ref 0–0.9)
MONOCYTES NFR BLD AUTO: 5.8 % — SIGNIFICANT CHANGE UP (ref 2–14)
NEUTROPHILS # BLD AUTO: 9.2 K/UL — HIGH (ref 1.8–7.4)
NEUTROPHILS NFR BLD AUTO: 85.8 % — HIGH (ref 43–77)
NRBC # BLD: 0 /100 WBCS — SIGNIFICANT CHANGE UP (ref 0–0)
NRBC # BLD: 0 /100 WBCS — SIGNIFICANT CHANGE UP (ref 0–0)
PHOSPHATE SERPL-MCNC: 3.2 MG/DL — SIGNIFICANT CHANGE UP (ref 2.5–4.5)
PLATELET # BLD AUTO: 48 K/UL — LOW (ref 150–400)
PLATELET # BLD AUTO: 58 K/UL — LOW (ref 150–400)
POTASSIUM SERPL-MCNC: 4.2 MMOL/L — SIGNIFICANT CHANGE UP (ref 3.5–5.3)
POTASSIUM SERPL-SCNC: 4.2 MMOL/L — SIGNIFICANT CHANGE UP (ref 3.5–5.3)
PROT SERPL-MCNC: 4.8 G/DL — LOW (ref 6–8.3)
RBC # BLD: 4.23 M/UL — SIGNIFICANT CHANGE UP (ref 4.2–5.8)
RBC # BLD: 4.39 M/UL — SIGNIFICANT CHANGE UP (ref 4.2–5.8)
RBC # FLD: 14.7 % — HIGH (ref 10.3–14.5)
RBC # FLD: 14.8 % — HIGH (ref 10.3–14.5)
S PNEUM AG UR QL: NEGATIVE — SIGNIFICANT CHANGE UP
SODIUM SERPL-SCNC: 149 MMOL/L — HIGH (ref 135–145)
SPECIMEN SOURCE: SIGNIFICANT CHANGE UP
SPECIMEN SOURCE: SIGNIFICANT CHANGE UP
TROPONIN I, HIGH SENSITIVITY RESULT: 471 NG/L — HIGH
WBC # BLD: 10.72 K/UL — HIGH (ref 3.8–10.5)
WBC # BLD: 8.92 K/UL — SIGNIFICANT CHANGE UP (ref 3.8–10.5)
WBC # FLD AUTO: 10.72 K/UL — HIGH (ref 3.8–10.5)
WBC # FLD AUTO: 8.92 K/UL — SIGNIFICANT CHANGE UP (ref 3.8–10.5)

## 2024-10-13 PROCEDURE — 99233 SBSQ HOSP IP/OBS HIGH 50: CPT

## 2024-10-13 PROCEDURE — 93010 ELECTROCARDIOGRAM REPORT: CPT | Mod: 76

## 2024-10-13 RX ORDER — INFLUENZ VIR VAC TV P-SURF2003 15MCG/.5ML
0.5 SYRINGE (ML) INTRAMUSCULAR ONCE
Refills: 0 | Status: DISCONTINUED | OUTPATIENT
Start: 2024-10-13 | End: 2024-10-22

## 2024-10-13 RX ORDER — VANCOMYCIN HYDROCHLORIDE 50 MG/ML
1000 KIT ORAL EVERY 12 HOURS
Refills: 0 | Status: DISCONTINUED | OUTPATIENT
Start: 2024-10-13 | End: 2024-10-17

## 2024-10-13 RX ORDER — VANCOMYCIN HYDROCHLORIDE 50 MG/ML
KIT ORAL
Refills: 0 | Status: DISCONTINUED | OUTPATIENT
Start: 2024-10-13 | End: 2024-10-17

## 2024-10-13 RX ORDER — VANCOMYCIN HYDROCHLORIDE 50 MG/ML
1000 KIT ORAL ONCE
Refills: 0 | Status: COMPLETED | OUTPATIENT
Start: 2024-10-13 | End: 2024-10-13

## 2024-10-13 RX ORDER — HEPARIN SODIUM 10000 [USP'U]/ML
5000 INJECTION INTRAVENOUS; SUBCUTANEOUS EVERY 12 HOURS
Refills: 0 | Status: DISCONTINUED | OUTPATIENT
Start: 2024-10-13 | End: 2024-10-22

## 2024-10-13 RX ADMIN — Medication 80 MILLILITER(S): at 18:44

## 2024-10-13 RX ADMIN — HEPARIN SODIUM 5000 UNIT(S): 10000 INJECTION INTRAVENOUS; SUBCUTANEOUS at 06:34

## 2024-10-13 RX ADMIN — Medication 20 MILLIGRAM(S): at 00:40

## 2024-10-13 RX ADMIN — Medication 80 MILLILITER(S): at 22:16

## 2024-10-13 RX ADMIN — VANCOMYCIN HYDROCHLORIDE 250 MILLIGRAM(S): KIT at 13:26

## 2024-10-13 RX ADMIN — Medication 0.4 MILLIGRAM(S): at 22:16

## 2024-10-13 RX ADMIN — Medication 20 MILLIGRAM(S): at 22:16

## 2024-10-13 RX ADMIN — CILOSTAZOL 100 MILLIGRAM(S): 100 TABLET ORAL at 06:34

## 2024-10-13 RX ADMIN — CILOSTAZOL 100 MILLIGRAM(S): 100 TABLET ORAL at 17:34

## 2024-10-13 RX ADMIN — SODIUM CHLORIDE 75 MILLILITER(S): 9 INJECTION, SOLUTION INTRAMUSCULAR; INTRAVENOUS; SUBCUTANEOUS at 01:43

## 2024-10-13 RX ADMIN — HEPARIN SODIUM 5000 UNIT(S): 10000 INJECTION INTRAVENOUS; SUBCUTANEOUS at 17:34

## 2024-10-13 RX ADMIN — Medication 100 MILLIGRAM(S): at 00:40

## 2024-10-13 RX ADMIN — SODIUM CHLORIDE 75 MILLILITER(S): 9 INJECTION, SOLUTION INTRAMUSCULAR; INTRAVENOUS; SUBCUTANEOUS at 11:45

## 2024-10-13 RX ADMIN — Medication 0.4 MILLIGRAM(S): at 00:40

## 2024-10-13 RX ADMIN — CLOPIDOGREL 75 MILLIGRAM(S): 75 TABLET ORAL at 11:45

## 2024-10-13 NOTE — PATIENT PROFILE ADULT - FALL HARM RISK - HARM RISK INTERVENTIONS
Assistance with ambulation/Assistance OOB with selected safe patient handling equipment/Communicate Risk of Fall with Harm to all staff/Discuss with provider need for PT consult/Monitor gait and stability/Reinforce activity limits and safety measures with patient and family/Tailored Fall Risk Interventions/Visual Cue: Yellow wristband and red socks/Bed in lowest position, wheels locked, appropriate side rails in place/Call bell, personal items and telephone in reach/Instruct patient to call for assistance before getting out of bed or chair/Non-slip footwear when patient is out of bed/[place] to call system/Physically safe environment - no spills, clutter or unnecessary equipment/Purposeful Proactive Rounding/Room/bathroom lighting operational, light cord in reach

## 2024-10-13 NOTE — PROGRESS NOTE ADULT - PROBLEM SELECTOR PLAN 1
HR max 115, requiring 3L nasal cannula, white count 13, plt 82, lactate 2.3>2.9  Source likely aspiration pneumonia  UA negative  Received Zosyn in the ER  CXR with bilateral infiltrates     Will continue with ceftriaxone and azithromycin  Follow-up blood cultures  f/u legionella, strep pneumo, mycoplasma   f/u lactate

## 2024-10-13 NOTE — PROGRESS NOTE ADULT - ATTENDING COMMENTS
83yo m w pmh htn, hld, pad s/p le stents, bph, hiv, p/w ams following presumed unwitnessed fall; in er, found to be meeting severe sirs/sepsis criteria + in ahrf req 3 lpm via nc; suspect 2/2 aspiration iso fall, dehydration  Responsive 85 yo man, supine in bed, NAD  Vital Signs Last 24 Hrs  T(C): 36.4 (13 Oct 2024 16:16), Max: 37.1 (13 Oct 2024 07:39)  T(F): 97.5 (13 Oct 2024 16:16), Max: 98.7 (13 Oct 2024 07:39)  HR: 108 (13 Oct 2024 16:16) (98 - 108)  BP: 105/60 (13 Oct 2024 16:16) (100/77 - 121/76)  BP(mean): 82 (13 Oct 2024 04:45) (82 - 90)  RR: 18 (13 Oct 2024 16:16) (18 - 20)  SpO2: 98% (13 Oct 2024 16:16) (93% - 98%)  Parameters below as of 13 Oct 2024 16:16  Patient On (Oxygen Delivery Method): nasal cannula  O2 Flow (L/min): 2  Lungs, bilateral rhonchi  Cor, RRR  Abdomen, soft + hepatomegaly  Ext: edema of RUE and RLE, skin breakdown, right (may be portal of entry for bacteremia)  Neurological, responsive, oriented to person, not place or time                      right facial droop                      right hemiparesis  Growth in aerobic bottle: Gram Positive Cocci in Clusters   Growth in anaerobic bottle: Gram Positive Cocci in Clusters (10.12.24 @ 15:20) <10,000 CFU/ml < from: CT Head No Cont (10.12.24 @ 18:44) >    Impression: Normal CT of the brain.    < end of copied text >                          13.7   8.92  )-----------( 58       ( 13 Oct 2024 13:15 )             40.8   10-13    149[H]  |  118[H]  |  32[H]  ----------------------------<  77  4.2   |  21[L]  |  0.86    Ca    8.7      13 Oct 2024 07:05  Phos  3.2     10-13  Mg     2.3     10-13    TPro  4.8[L]  /  Alb  1.9[L]  /  TBili  1.0  /  DBili  x   /  AST  72[H]  /  ALT  44  /  AlkPhos  65  10-13    t    IMP:   Severe sirs/sepsis, gram positive cocci, likely staph, portal of entry from abraided skin on legs or scalp  Leukocytosis, tachycardic + lactic acidosis; meets severe sepsis criteria  hypernatremia secondary to dehydration  s/p 2.5 L ivf + zosyn in ER  trend lactate q4-6h until wnl  follow up blood cultures      ivf resusci + lytes as needed.    Ahrf 2/2 pna  currently in no respiratory distress, w adequate spo2 at room air  cxr pending final read, but appears to be showing rll opacities/consolidation/infiltrate  h/o pad s/p le stents  s/p zosyn in er  follow up full rvp, mrsa screen, atypical pna work up, procal, sputum cultures, cxr final read  Monitor SpO2, RR, for signs of respiratory distress; Goal SpO2 >88-92%, PaO2 >55-60 mmHg  add vancomycin q 12 hours pending final culture results  bronchodilators + oxygen supplementation as needed  aggressive pulmonary toilet/hygiene  symptomatic relief with antitussives, mucolytics, antipyretics  aspiration precautions with head end of bed elevated in the mean time    Dehydration/rhabdomyolysis  hyperNa, hyperCl, brennan, lactic acidosis  cpk ~515; ua w mod blood, rbc ~8; a/w elevated trop (demand/decreased clearance/rhabdo; otherwise, no clinft of acs; ekg with no st seg - t wave changes suggestive of acute ischemia) + transaminitis  s/p 2.5 L IVF in er  follow up urine studies  Monitor UO, BUN/Cr, volume status, acid-base balance, electrolytes  trend na q4-6h until wnl; goal rate of correction ~8 meq/l/day  trend lactate q4-6h until wnl  avoid nephrotoxic agents; appropriate dose adjustments for all renally cleared medications  IVF resusci + electrolyte supplementation as needed     Fall  h/o pad s/p le stents  neuroimaging shows chronic microvascular ischemic changes  ekg with no new st seg - t wave changes suggestive of acute ischemia  follow up tsh, folate, b12, rpr; orthostatic vitals, tte; full rvp  Monitor mental status with frequent neurochecks  monitor on telemetry  maintain fall, delirium, seizure, precautions  nutrition consult  pt/ot eval + sw/cm consult for disposition    HIV:  VL and CD4; resume ARV, when able to swallow.

## 2024-10-13 NOTE — PROGRESS NOTE ADULT - ASSESSMENT
84-year-old man with hypertension, HIV, BPH coming after his daughter found him on the floor.  She had not been able to get in touch with him for 3 days.  he meets SIRS criteria on admission and likely has aspiration pneumonia.  He also has mild rhabdo.      MED REC TO BE COMPLETED IN AM

## 2024-10-14 DIAGNOSIS — J18.9 PNEUMONIA, UNSPECIFIED ORGANISM: ICD-10-CM

## 2024-10-14 DIAGNOSIS — R78.81 BACTEREMIA: ICD-10-CM

## 2024-10-14 DIAGNOSIS — I49.9 CARDIAC ARRHYTHMIA, UNSPECIFIED: ICD-10-CM

## 2024-10-14 DIAGNOSIS — A41.9 SEPSIS, UNSPECIFIED ORGANISM: ICD-10-CM

## 2024-10-14 LAB
ALBUMIN SERPL ELPH-MCNC: 1.8 G/DL — LOW (ref 3.5–5)
ALP SERPL-CCNC: 63 U/L — SIGNIFICANT CHANGE UP (ref 40–120)
ALT FLD-CCNC: 36 U/L DA — SIGNIFICANT CHANGE UP (ref 10–60)
ANION GAP SERPL CALC-SCNC: 5 MMOL/L — SIGNIFICANT CHANGE UP (ref 5–17)
ANION GAP SERPL CALC-SCNC: 5 MMOL/L — SIGNIFICANT CHANGE UP (ref 5–17)
AST SERPL-CCNC: 59 U/L — HIGH (ref 10–40)
BASOPHILS # BLD AUTO: 0 K/UL — SIGNIFICANT CHANGE UP (ref 0–0.2)
BASOPHILS NFR BLD AUTO: 0 % — SIGNIFICANT CHANGE UP (ref 0–2)
BILIRUB SERPL-MCNC: 1.1 MG/DL — SIGNIFICANT CHANGE UP (ref 0.2–1.2)
BUN SERPL-MCNC: 21 MG/DL — HIGH (ref 7–18)
BUN SERPL-MCNC: 22 MG/DL — HIGH (ref 7–18)
CALCIUM SERPL-MCNC: 7.6 MG/DL — LOW (ref 8.4–10.5)
CALCIUM SERPL-MCNC: 8.2 MG/DL — LOW (ref 8.4–10.5)
CHLORIDE SERPL-SCNC: 117 MMOL/L — HIGH (ref 96–108)
CHLORIDE SERPL-SCNC: 118 MMOL/L — HIGH (ref 96–108)
CK SERPL-CCNC: 299 U/L — HIGH (ref 35–232)
CO2 SERPL-SCNC: 24 MMOL/L — SIGNIFICANT CHANGE UP (ref 22–31)
CO2 SERPL-SCNC: 26 MMOL/L — SIGNIFICANT CHANGE UP (ref 22–31)
CREAT SERPL-MCNC: 0.86 MG/DL — SIGNIFICANT CHANGE UP (ref 0.5–1.3)
CREAT SERPL-MCNC: 1.02 MG/DL — SIGNIFICANT CHANGE UP (ref 0.5–1.3)
CULTURE RESULTS: ABNORMAL
CULTURE RESULTS: ABNORMAL
EGFR: 72 ML/MIN/1.73M2 — SIGNIFICANT CHANGE UP
EGFR: 85 ML/MIN/1.73M2 — SIGNIFICANT CHANGE UP
EOSINOPHIL # BLD AUTO: 0.01 K/UL — SIGNIFICANT CHANGE UP (ref 0–0.5)
EOSINOPHIL NFR BLD AUTO: 0.1 % — SIGNIFICANT CHANGE UP (ref 0–6)
FOLATE SERPL-MCNC: <2 NG/ML — LOW
GLUCOSE SERPL-MCNC: 131 MG/DL — HIGH (ref 70–99)
GLUCOSE SERPL-MCNC: 96 MG/DL — SIGNIFICANT CHANGE UP (ref 70–99)
GRAM STN FLD: ABNORMAL
HCT VFR BLD CALC: 37.8 % — LOW (ref 39–50)
HGB BLD-MCNC: 12.7 G/DL — LOW (ref 13–17)
IMM GRANULOCYTES NFR BLD AUTO: 0.9 % — SIGNIFICANT CHANGE UP (ref 0–0.9)
LACTATE SERPL-SCNC: 1.5 MMOL/L — SIGNIFICANT CHANGE UP (ref 0.7–2)
LDH SERPL L TO P-CCNC: 293 U/L — HIGH (ref 120–225)
LEGIONELLA AG UR QL: NEGATIVE — SIGNIFICANT CHANGE UP
LYMPHOCYTES # BLD AUTO: 0.84 K/UL — LOW (ref 1–3.3)
LYMPHOCYTES # BLD AUTO: 10.6 % — LOW (ref 13–44)
M PNEUMO IGM SER-ACNC: 0.21 INDEX — SIGNIFICANT CHANGE UP (ref 0–0.9)
MAGNESIUM SERPL-MCNC: 2 MG/DL — SIGNIFICANT CHANGE UP (ref 1.6–2.6)
MAGNESIUM SERPL-MCNC: 2.2 MG/DL — SIGNIFICANT CHANGE UP (ref 1.6–2.6)
MAGNESIUM SERPL-MCNC: 2.2 MG/DL — SIGNIFICANT CHANGE UP (ref 1.6–2.6)
MCHC RBC-ENTMCNC: 32.1 PG — SIGNIFICANT CHANGE UP (ref 27–34)
MCHC RBC-ENTMCNC: 33.6 GM/DL — SIGNIFICANT CHANGE UP (ref 32–36)
MCV RBC AUTO: 95.5 FL — SIGNIFICANT CHANGE UP (ref 80–100)
MONOCYTES # BLD AUTO: 0.38 K/UL — SIGNIFICANT CHANGE UP (ref 0–0.9)
MONOCYTES NFR BLD AUTO: 4.8 % — SIGNIFICANT CHANGE UP (ref 2–14)
MRSA PCR RESULT.: SIGNIFICANT CHANGE UP
MYCOPLASMA AG SPEC QL: NEGATIVE — SIGNIFICANT CHANGE UP
NEUTROPHILS # BLD AUTO: 6.66 K/UL — SIGNIFICANT CHANGE UP (ref 1.8–7.4)
NEUTROPHILS NFR BLD AUTO: 83.6 % — HIGH (ref 43–77)
NRBC # BLD: 0 /100 WBCS — SIGNIFICANT CHANGE UP (ref 0–0)
ORGANISM # SPEC MICROSCOPIC CNT: ABNORMAL
PHOSPHATE SERPL-MCNC: 1.9 MG/DL — LOW (ref 2.5–4.5)
PHOSPHATE SERPL-MCNC: 2 MG/DL — LOW (ref 2.5–4.5)
PHOSPHATE SERPL-MCNC: 2.4 MG/DL — LOW (ref 2.5–4.5)
PLATELET # BLD AUTO: 57 K/UL — LOW (ref 150–400)
POTASSIUM SERPL-MCNC: 3.5 MMOL/L — SIGNIFICANT CHANGE UP (ref 3.5–5.3)
POTASSIUM SERPL-MCNC: 3.8 MMOL/L — SIGNIFICANT CHANGE UP (ref 3.5–5.3)
POTASSIUM SERPL-MCNC: 3.8 MMOL/L — SIGNIFICANT CHANGE UP (ref 3.5–5.3)
POTASSIUM SERPL-SCNC: 3.5 MMOL/L — SIGNIFICANT CHANGE UP (ref 3.5–5.3)
POTASSIUM SERPL-SCNC: 3.8 MMOL/L — SIGNIFICANT CHANGE UP (ref 3.5–5.3)
POTASSIUM SERPL-SCNC: 3.8 MMOL/L — SIGNIFICANT CHANGE UP (ref 3.5–5.3)
PROCALCITONIN SERPL-MCNC: 1.43 NG/ML — HIGH (ref 0.02–0.1)
PROT SERPL-MCNC: 4.6 G/DL — LOW (ref 6–8.3)
RAPID RVP RESULT: SIGNIFICANT CHANGE UP
RBC # BLD: 3.96 M/UL — LOW (ref 4.2–5.8)
RBC # FLD: 14.6 % — HIGH (ref 10.3–14.5)
S AUREUS DNA NOSE QL NAA+PROBE: SIGNIFICANT CHANGE UP
SARS-COV-2 RNA SPEC QL NAA+PROBE: SIGNIFICANT CHANGE UP
SODIUM SERPL-SCNC: 147 MMOL/L — HIGH (ref 135–145)
SODIUM SERPL-SCNC: 148 MMOL/L — HIGH (ref 135–145)
SPECIMEN SOURCE: SIGNIFICANT CHANGE UP
TROPONIN I, HIGH SENSITIVITY RESULT: 295 NG/L — HIGH
TROPONIN I, HIGH SENSITIVITY RESULT: 481.9 NG/L — HIGH
TSH SERPL-MCNC: 2.25 UU/ML — SIGNIFICANT CHANGE UP (ref 0.34–4.82)
VIT B12 SERPL-MCNC: >2000 PG/ML — HIGH (ref 232–1245)
WBC # BLD: 7.96 K/UL — SIGNIFICANT CHANGE UP (ref 3.8–10.5)
WBC # FLD AUTO: 7.96 K/UL — SIGNIFICANT CHANGE UP (ref 3.8–10.5)

## 2024-10-14 PROCEDURE — 71045 X-RAY EXAM CHEST 1 VIEW: CPT | Mod: 26

## 2024-10-14 PROCEDURE — 99233 SBSQ HOSP IP/OBS HIGH 50: CPT

## 2024-10-14 PROCEDURE — 99223 1ST HOSP IP/OBS HIGH 75: CPT

## 2024-10-14 RX ORDER — POTASSIUM CHLORIDE 10 MEQ
10 TABLET, EXTENDED RELEASE ORAL
Refills: 0 | Status: COMPLETED | OUTPATIENT
Start: 2024-10-14 | End: 2024-10-14

## 2024-10-14 RX ORDER — POTASSIUM CHLORIDE 10 MEQ
40 TABLET, EXTENDED RELEASE ORAL EVERY 4 HOURS
Refills: 0 | Status: DISCONTINUED | OUTPATIENT
Start: 2024-10-14 | End: 2024-10-14

## 2024-10-14 RX ORDER — CEFTRIAXONE SODIUM 10 G
2000 VIAL (EA) INJECTION EVERY 24 HOURS
Refills: 0 | Status: DISCONTINUED | OUTPATIENT
Start: 2024-10-15 | End: 2024-10-20

## 2024-10-14 RX ORDER — POTASSIUM PHOSPHATE 236; 224 MG/ML; MG/ML
30 INJECTION, SOLUTION INTRAVENOUS ONCE
Refills: 0 | Status: COMPLETED | OUTPATIENT
Start: 2024-10-14 | End: 2024-10-14

## 2024-10-14 RX ORDER — SODIUM CHLORIDE 9 MG/ML
1000 INJECTION, SOLUTION INTRAMUSCULAR; INTRAVENOUS; SUBCUTANEOUS ONCE
Refills: 0 | Status: COMPLETED | OUTPATIENT
Start: 2024-10-14 | End: 2024-10-14

## 2024-10-14 RX ORDER — IPRATROPIUM BROMIDE AND ALBUTEROL SULFATE .5; 2.5 MG/3ML; MG/3ML
3 SOLUTION RESPIRATORY (INHALATION) ONCE
Refills: 0 | Status: COMPLETED | OUTPATIENT
Start: 2024-10-14 | End: 2024-10-14

## 2024-10-14 RX ORDER — DIBASIC SODIUM PHOSPHATE, MONOBASIC POTASSIUM PHOSPHATE AND MONOBASIC SODIUM PHOSPHATE 852; 155; 130 MG/1; MG/1; MG/1
1 TABLET ORAL ONCE
Refills: 0 | Status: COMPLETED | OUTPATIENT
Start: 2024-10-14 | End: 2024-10-14

## 2024-10-14 RX ORDER — IPRATROPIUM BROMIDE AND ALBUTEROL SULFATE .5; 2.5 MG/3ML; MG/3ML
3 SOLUTION RESPIRATORY (INHALATION) EVERY 6 HOURS
Refills: 0 | Status: DISCONTINUED | OUTPATIENT
Start: 2024-10-14 | End: 2024-10-22

## 2024-10-14 RX ORDER — METOPROLOL TARTRATE 50 MG
5 TABLET ORAL ONCE
Refills: 0 | Status: COMPLETED | OUTPATIENT
Start: 2024-10-14 | End: 2024-10-14

## 2024-10-14 RX ORDER — SODIUM CHLORIDE 9 MG/ML
500 INJECTION, SOLUTION INTRAMUSCULAR; INTRAVENOUS; SUBCUTANEOUS ONCE
Refills: 0 | Status: COMPLETED | OUTPATIENT
Start: 2024-10-14 | End: 2024-10-14

## 2024-10-14 RX ORDER — GUAIFENESIN 100 MG/5ML
600 LIQUID ORAL EVERY 12 HOURS
Refills: 0 | Status: DISCONTINUED | OUTPATIENT
Start: 2024-10-14 | End: 2024-10-22

## 2024-10-14 RX ORDER — SODIUM CHLORIDE 9 MG/ML
4 INJECTION, SOLUTION INTRAMUSCULAR; INTRAVENOUS; SUBCUTANEOUS ONCE
Refills: 0 | Status: COMPLETED | OUTPATIENT
Start: 2024-10-14 | End: 2024-10-14

## 2024-10-14 RX ORDER — SODIUM CHLORIDE 9 MG/ML
1000 INJECTION, SOLUTION INTRAMUSCULAR; INTRAVENOUS; SUBCUTANEOUS ONCE
Refills: 0 | Status: DISCONTINUED | OUTPATIENT
Start: 2024-10-14 | End: 2024-10-14

## 2024-10-14 RX ADMIN — IPRATROPIUM BROMIDE AND ALBUTEROL SULFATE 3 MILLILITER(S): .5; 2.5 SOLUTION RESPIRATORY (INHALATION) at 06:09

## 2024-10-14 RX ADMIN — Medication 20 MILLIGRAM(S): at 21:48

## 2024-10-14 RX ADMIN — CLOPIDOGREL 75 MILLIGRAM(S): 75 TABLET ORAL at 12:25

## 2024-10-14 RX ADMIN — Medication 80 MILLILITER(S): at 12:31

## 2024-10-14 RX ADMIN — SODIUM CHLORIDE 8.33 MILLILITER(S): 9 INJECTION, SOLUTION INTRAMUSCULAR; INTRAVENOUS; SUBCUTANEOUS at 23:47

## 2024-10-14 RX ADMIN — IPRATROPIUM BROMIDE AND ALBUTEROL SULFATE 3 MILLILITER(S): .5; 2.5 SOLUTION RESPIRATORY (INHALATION) at 20:31

## 2024-10-14 RX ADMIN — Medication 40 MILLIEQUIVALENT(S): at 01:27

## 2024-10-14 RX ADMIN — SODIUM CHLORIDE 1000 MILLILITER(S): 9 INJECTION, SOLUTION INTRAMUSCULAR; INTRAVENOUS; SUBCUTANEOUS at 16:15

## 2024-10-14 RX ADMIN — GUAIFENESIN 600 MILLIGRAM(S): 100 LIQUID ORAL at 21:48

## 2024-10-14 RX ADMIN — IPRATROPIUM BROMIDE AND ALBUTEROL SULFATE 3 MILLILITER(S): .5; 2.5 SOLUTION RESPIRATORY (INHALATION) at 15:34

## 2024-10-14 RX ADMIN — CILOSTAZOL 100 MILLIGRAM(S): 100 TABLET ORAL at 05:21

## 2024-10-14 RX ADMIN — Medication 5 MILLIGRAM(S): at 22:14

## 2024-10-14 RX ADMIN — Medication 30 MILLIGRAM(S): at 05:21

## 2024-10-14 RX ADMIN — Medication 100 MILLIEQUIVALENT(S): at 01:28

## 2024-10-14 RX ADMIN — Medication 100 MILLIGRAM(S): at 00:09

## 2024-10-14 RX ADMIN — Medication 100 MILLIGRAM(S): at 21:48

## 2024-10-14 RX ADMIN — GUAIFENESIN 600 MILLIGRAM(S): 100 LIQUID ORAL at 05:21

## 2024-10-14 RX ADMIN — Medication 80 MILLILITER(S): at 22:14

## 2024-10-14 RX ADMIN — HEPARIN SODIUM 5000 UNIT(S): 10000 INJECTION INTRAVENOUS; SUBCUTANEOUS at 05:21

## 2024-10-14 RX ADMIN — DIBASIC SODIUM PHOSPHATE, MONOBASIC POTASSIUM PHOSPHATE AND MONOBASIC SODIUM PHOSPHATE 1 PACKET(S): 852; 155; 130 TABLET ORAL at 22:14

## 2024-10-14 RX ADMIN — HEPARIN SODIUM 5000 UNIT(S): 10000 INJECTION INTRAVENOUS; SUBCUTANEOUS at 17:46

## 2024-10-14 RX ADMIN — Medication 100 MILLIGRAM(S): at 05:22

## 2024-10-14 RX ADMIN — Medication 100 MILLIEQUIVALENT(S): at 03:34

## 2024-10-14 RX ADMIN — VANCOMYCIN HYDROCHLORIDE 250 MILLIGRAM(S): KIT at 13:15

## 2024-10-14 RX ADMIN — POTASSIUM PHOSPHATE 83.33 MILLIMOLE(S): 236; 224 INJECTION, SOLUTION INTRAVENOUS at 02:31

## 2024-10-14 RX ADMIN — CILOSTAZOL 100 MILLIGRAM(S): 100 TABLET ORAL at 17:46

## 2024-10-14 RX ADMIN — VANCOMYCIN HYDROCHLORIDE 250 MILLIGRAM(S): KIT at 01:03

## 2024-10-14 RX ADMIN — Medication 0.4 MILLIGRAM(S): at 21:48

## 2024-10-14 RX ADMIN — Medication 100 MILLIEQUIVALENT(S): at 02:31

## 2024-10-14 NOTE — PROGRESS NOTE ADULT - SUBJECTIVE AND OBJECTIVE BOX
NP Note discussed with  Primary Attending    Patient is a 84y old  Male who presents with a chief complaint of sepsis (14 Oct 2024 10:52).  Pt. seen at bedside, able to converse though incoherently, asking to speak to his wife on the phone.      INTERVAL HPI/OVERNIGHT EVENTS: no new complaints    MEDICATIONS  (STANDING):  albuterol/ipratropium for Nebulization 3 milliLiter(s) Nebulizer every 6 hours  cefTRIAXone   IVPB 1000 milliGRAM(s) IV Intermittent every 24 hours  cilostazol 100 milliGRAM(s) Oral two times a day  clopidogrel Tablet 75 milliGRAM(s) Oral daily  heparin   Injectable 5000 Unit(s) SubCutaneous every 12 hours  influenza  Vaccine (HIGH DOSE) 0.5 milliLiter(s) IntraMuscular once  NIFEdipine XL 30 milliGRAM(s) Oral daily  rosuvastatin 20 milliGRAM(s) Oral at bedtime  sodium chloride 0.45%. 1000 milliLiter(s) (80 mL/Hr) IV Continuous <Continuous>  sodium chloride 0.9% Bolus 1000 milliLiter(s) IV Bolus once  sodium chloride 0.9%. 1000 milliLiter(s) (75 mL/Hr) IV Continuous <Continuous>  tamsulosin 0.4 milliGRAM(s) Oral at bedtime  vancomycin  IVPB      vancomycin  IVPB 1000 milliGRAM(s) IV Intermittent every 12 hours    MEDICATIONS  (PRN):  aluminum hydroxide/magnesium hydroxide/simethicone Suspension 30 milliLiter(s) Oral every 4 hours PRN Dyspepsia  benzonatate 100 milliGRAM(s) Oral every 8 hours PRN Cough  guaiFENesin  milliGRAM(s) Oral every 12 hours PRN Cough  melatonin 3 milliGRAM(s) Oral at bedtime PRN Insomnia  ondansetron Injectable 4 milliGRAM(s) IV Push every 8 hours PRN Nausea and/or Vomiting      __________________________________________________  REVIEW OF SYSTEMS:    CONSTITUTIONAL: No fever,   EYES: no acute visual disturbances  NECK: No pain or stiffness  RESPIRATORY: No cough; No shortness of breath  CARDIOVASCULAR: No chest pain, no palpitations  GASTROINTESTINAL: No pain. No nausea or vomiting; No diarrhea   NEUROLOGICAL: No headache or numbness, no tremors  MUSCULOSKELETAL: No joint pain, no muscle pain  GENITOURINARY: no dysuria, no frequency, no hesitancy  PSYCHIATRY: no depression , no anxiety  ALL OTHER  ROS negative        Vital Signs Last 24 Hrs  T(C): 36.7 (14 Oct 2024 11:01), Max: 36.7 (14 Oct 2024 07:36)  T(F): 98.1 (14 Oct 2024 11:01), Max: 98.1 (14 Oct 2024 11:01)  HR: 90 (14 Oct 2024 11:01) (90 - 122)  BP: 100/47 (14 Oct 2024 11:01) (100/47 - 139/70)  BP(mean): --  RR: 20 (14 Oct 2024 11:01) (18 - 30)  SpO2: 97% (14 Oct 2024 11:01) (76% - 99%)    Parameters below as of 14 Oct 2024 11:01  Patient On (Oxygen Delivery Method): nasal cannula  O2 Flow (L/min): 2      ________________________________________________  PHYSICAL EXAM:  Chronically ill appearing  GENERAL: NAD  HEENT: Normocephalic;  conjunctivae and sclerae clear; moist mucous membranes;   NECK : supple  CHEST/LUNG: Clear to auscultation bilaterally with good air entry   HEART: S1 S2  regular; no murmurs, gallops or rubs  ABDOMEN: Soft, Nontender, Nondistended; Bowel sounds present  EXTREMITIES: no cyanosis; no edema; no calf tenderness  SKIN: warm and dry; no rash  NERVOUS SYSTEM:  Awake and alert; Oriented  to place, person and time ; no new deficits    _________________________________________________  LABS:                        12.7   7.96  )-----------( 57       ( 14 Oct 2024 06:40 )             37.8     10-14    148[H]  |  117[H]  |  22[H]  ----------------------------<  96  3.8   |  26  |  0.86    Ca    8.2[L]      14 Oct 2024 06:40  Phos  2.4     10-14  Mg     2.2     10-14    TPro  4.6[L]  /  Alb  1.8[L]  /  TBili  1.1  /  DBili  x   /  AST  59[H]  /  ALT  36  /  AlkPhos  63  10-14      Urinalysis Basic - ( 14 Oct 2024 06:40 )    Color: x / Appearance: x / SG: x / pH: x  Gluc: 96 mg/dL / Ketone: x  / Bili: x / Urobili: x   Blood: x / Protein: x / Nitrite: x   Leuk Esterase: x / RBC: x / WBC x   Sq Epi: x / Non Sq Epi: x / Bacteria: x    Culture - Blood (10.12.24 @ 15:20)    Gram Stain:   Growth in aerobic bottle: Gram Positive Cocci in Clusters  Growth in anaerobic bottle: Gram Positive Cocci in Clusters   Specimen Source: .Blood BLOOD   Culture Results:   Growth in aerobic bottle: Staphylococcus epidermidis  "Susceptibilities not performed"  Growth in aerobic and anaerobic bottles: Aerococcus viridans  Growth in anaerobic bottle: Aerococcus urinae  Isolates of Aerococcus spp. are predictably susceptible to penicillin,  ampicillin, and vancomycin. All isolates are resistant to sulfonamides.    Culture - Blood (10.12.24 @ 15:15)    -  Blood PCR Panel: NEG   -  Blood PCR Panel: NEG   Gram Stain:   Growth in aerobic bottle: Gram Positive Cocci in Clusters  Growth in anaerobic bottle: Gram positive cocci in pairs   Specimen Source: .Blood BLOOD   Organism: Blood Culture PCR   Organism: Blood Culture PCR   Culture Results:   Growth in aerobic and anaerobic bottles: Aerococcus viridans  Growth in anaerobic bottle: Aerococcus urinae  Isolates of Aerococcus spp. are predictably susceptible to penicillin,  ampicillin, and vancomycin. All isolates are resistant to sulfonamides.  Direct identification is available within approximately 3-5  hours either by Blood Panel Multiplexed PCR or Direct  MALDI-TOF. Details: [email or web address]   Organism Identification: Blood Culture PCR  Blood Culture PCR   Method Type: PCR   Method Type: PCR    Urinalysis with Rflx Culture (10.12.24 @ 16:00)    Urine Appearance: Cloudy   Color: Dark Yellow   Specific Gravity: 1.025   pH Urine: 5.5   Protein, Urine: 100 mg/dL   Glucose Qualitative, Urine: Negative mg/dL   Ketone - Urine: 15 mg/dL   Blood, Urine: Moderate   Bilirubin: Negative   Urobilinogen: 1.0 mg/dL   Leukocyte Esterase Concentration: Negative   Nitrite: Negative      CAPILLARY BLOOD GLUCOSE    RADIOLOGY & ADDITIONAL TESTS:    < from: Xray Chest 1 View AP/PA (10.14.24 @ 09:00) >    ACC: [id number] EXAM:  XR CHEST AP OR PA 1V   ORDERED BY: SUJIT PARISH     PROCEDURE DATE:  10/14/2024          INTERPRETATION:  AP erect chest on October 14, 2024 at 8:32 AM. Patient   has rhonchi.    Heart difficult to assess.    There is a moderate left base effusion. There is a loculated effusion and   minor fissure.    These basilar findings have increased from October 12.    IMPRESSION: Increasing bibasilar fluid accumulations.    --- End of Report ---    < end of copied text >        < from: CT Head No Cont (10.12.24 @ 18:44) >    ACC: [id number] EXAM:  CT BRAIN   ORDERED BY: ESPERANZA LANDRUM     PROCEDURE DATE:  10/12/2024          INTERPRETATION:  CLINICAL INFORMATION: fakk unwitnessed    COMPARISON: None.    CONTRAST:  IV Contrast: NONE  Complications: None reportedat time of study completion    Technique: CT head was performed utilizing axial images from the base of   the skull through the vertex without the administration of intravenous   contrast. Images were reviewed in the bone, brain and subdural windows.    Findings:    There is no evidence of acute intracranial hemorrhage or acute   transcortical infarct.  The ventricles are normal in size and caliber.   There are patchy areas of low density within the periventricular white   matter consistent with mild-to-moderate microvascular disease. There is   enlargement of the sulci, cisterns and ventricles consistent with mild   cerebral and cerebellar volume loss. There is no evidence of   hydrocephalus.  There is no evidence of mass-effect or midline shift. There is no   evidence of an intra or extra-axial fluid collection.    Visualized paranasal sinuses and bilateral mastoid air cells are clear.   There is scalp and subcutaneous soft tissue swelling and edema at the   vertex and involving the posterior right parietal occipital and left   temporal parietal and occipital scalp. No acute calvarial fractures are   identified.    Impression: Normal CT of the brain.    < end of copied text >      Imaging Personally Reviewed:  YES/NO    Consultant(s) Notes Reviewed:   YES/ No    Care Discussed with Consultants :     Plan of care was discussed with patient and /or primary care giver; all questions and concerns were addressed and care was aligned with patient's wishes.

## 2024-10-14 NOTE — CONSULT NOTE ADULT - ASSESSMENT
Assessment/Plan:    Wound Consult requested to assist w/ management of Right lateral and posterior leg ulcers, likely venous in nature, Left 2nd and third toe blisters, Coccyx DTI, Right ischium DTI, Left ischium pressure ulcer unstegable, Scrotum IAD. No s/s of infection or necrosis on any of the wounds, blood cultures positive likely not from wounds.    Right lateral leg ulcer- Wash with saline, apply medihoney, adaptic, aquacel, gauze, saul, secure with tape daily/PRN  Right posterior leg ulcer-  Wash with saline, apply adaptic, aquacel, gauze, saul, secure with tape daily/PRN  Left 2nd and 3rd toe tip blisters- paint with betadine, cover with DSD daily/ PRN  Coccyx DTI- Wash with saline, apply medihoney, cover with Allevyn foam daily/PRN  Right Ischium DTI- wash with saline, apply cavilon, cover with Allevyn foam daily/PRN  Left Ischium Unstegable-  Wash with saline, apply medihoney, cover with Allevyn foam daily/PRN  Scrotum IAD- Wash with soap and water, pat dry, apply Yris barrier cream daily/PRN when soiled    Podiatry consult for toe blisters with hx of PVD and stents  Vacular consult for likely venous leg ulcers  Consider CRISTIAN/PVR, Duplex, non palpable pedal pulses, hx of stents  BLE elevation  Abx per Medicine/ ID  Moisturize intact skin w/ SWEEN cream BID  Nutrition Consult for optimization as tolerated in pt w/ Protein Calorie Malnutirion        Inadequate PO intake        consider MVI & Vit C to promote wound healing        encourage high quality protein when appropriate  Continue turning and positioning w/ offloading assistive devices as per protocol  Continue w/ Pericare as per protocol  Waffle Cushion to chair when oob to chair  Continue w/ low air loss fluidized bed surface     Care as per medicine, will follow w/ you    Upon discharge f/u as outpatient at [facility] [address] [facility] [phone]  Seen w/ attng and D/w team    Upon discharge f/u as outpatient at [facility] [address] [facility] [phone]/ [facility]  [address] [facility] [address] 2nd floor [phone]      Thank you for this consult  TRISHA Murry-BC, (Teams/ [id number])    If after 4PM or before 7:30AM on Mon-Friday or weekend/holiday please contact general surgery for urgent matters.    For non-urgent matters e-mail [email or web address]    I/We spent (70min) minutes face to face with this patient of which more than 50% of the time was spent counseling & coordinating care of this pt

## 2024-10-14 NOTE — PROGRESS NOTE ADULT - PROBLEM SELECTOR PLAN 6
likely in the setting of sepsis and HIV  -Plt count 82-->57  -No signs of active bleeding  -Consider hem/onc consult if not improved

## 2024-10-14 NOTE — PROGRESS NOTE ADULT - PROBLEM SELECTOR PLAN 1
Blood culture 10/12 grew Aerococcus Viridans in all bottles  -Cont Ceftriaxone and Vanco for now  -Consider ID consult  -f/u repeat blood cultures

## 2024-10-14 NOTE — CONSULT NOTE ADULT - SUBJECTIVE AND OBJECTIVE BOX
PATIENT SEEN AND EXAMINED BY ADEN ZHANG M.D. ON :- 10/14/24  DATE OF SERVICE:   10/14/24          Interim events noted,Labs ,Radiological studies and Cardiology tests reviewed .    Patient is a 84y old  Male who presents with a chief complaint of sepsis (14 Oct 2024 17:19)      HPI:  84-year-old man from home with hypertension, PVD, BPH, HIV (on [place]??) coming with his daughter after she found him on the floor.  She had been trying to get in touch with him for 3 days but was unable so came up to [place] from [place], where she had recently moved, and found him on the floor covered in feces and urine.  He was confused at the time.  He does not recall details about how he got to the floor.  Per daughter, he is independent and mentate as well at baseline. On exam, he appears to be A&O x 3 but he is very difficult to understand.  He appears very dry and sounds like he is swallowing his tongue.  His daughter has difficulty understanding him as well.    Exam is notable for very dry appearance, garbled speech, rhonchi on all anterior lung fields, saturating 88% on room air and requiring 3 L nasal cannula, bilateral lower extremity pitting edema with old and new wounds on the legs, and an abrasion on the forehead.     ED Course    Vitals: HRmax 115, O2 88% on RA, requiring 3L NC  Labs: WBC 13, plt 82, trops 550>538, lactate 2.3>2.9, BUN/Cr 39/1.26, gluc 64, , UA with RBC, granular casts, protein  Intervention: s/p zosyn, 2.5L IVF  Consults:   Images: CTH wnl; CXR with bl LL consolidations on wet read        (12 Oct 2024 21:33)      PAST MEDICAL & SURGICAL HISTORY:  HIV (human immunodeficiency virus infection)      Umbilical hernia      Essential hypertension      Cardiomegaly      Elevated PSA      Urge incontinence      PVD (peripheral vascular disease)      Shingles      Enlarged prostate      Unilateral inguinal hernia, without obstruction or gangrene, not specified as recurrent      S/P hernia repair  right inguinal - 2013      History of cystoscopy  with photovaporization, green light laser lithotripsy 2016      PVD (peripheral vascular disease)  bilateral leg stents 2020      S/P cataract surgery          PREVIOUS DIAGNOSTIC TESTING:      ECHO  FINDINGS:    STRESS  FINDINGS:    CATHETERIZATION  FINDINGS:    MEDICATIONS  (STANDING):  albuterol/ipratropium for Nebulization 3 milliLiter(s) Nebulizer every 6 hours  cilostazol 100 milliGRAM(s) Oral two times a day  clopidogrel Tablet 75 milliGRAM(s) Oral daily  heparin   Injectable 5000 Unit(s) SubCutaneous every 12 hours  influenza  Vaccine (HIGH DOSE) 0.5 milliLiter(s) IntraMuscular once  NIFEdipine XL 30 milliGRAM(s) Oral daily  rosuvastatin 20 milliGRAM(s) Oral at bedtime  sodium chloride 0.45%. 1000 milliLiter(s) (80 mL/Hr) IV Continuous <Continuous>  sodium chloride 0.9%. 1000 milliLiter(s) (75 mL/Hr) IV Continuous <Continuous>  tamsulosin 0.4 milliGRAM(s) Oral at bedtime  vancomycin  IVPB      vancomycin  IVPB 1000 milliGRAM(s) IV Intermittent every 12 hours    MEDICATIONS  (PRN):  aluminum hydroxide/magnesium hydroxide/simethicone Suspension 30 milliLiter(s) Oral every 4 hours PRN Dyspepsia  benzonatate 100 milliGRAM(s) Oral every 8 hours PRN Cough  guaiFENesin  milliGRAM(s) Oral every 12 hours PRN Cough  melatonin 3 milliGRAM(s) Oral at bedtime PRN Insomnia  ondansetron Injectable 4 milliGRAM(s) IV Push every 8 hours PRN Nausea and/or Vomiting      FAMILY HISTORY:  No pertinent family history in first degree relatives        SOCIAL HISTORY:    CIGARETTES:    ALCOHOL:    REVIEW OF SYSTEMS:  CONSTITUTIONAL: No fever, weight loss, or fatigue  EYES: No eye pain, visual disturbances, or discharge  ENMT:  No difficulty hearing, tinnitus, vertigo; No sinus or throat pain  NECK: No pain or stiffness  RESPIRATORY: No cough, wheezing, chills or hemoptysis; No shortness of breath  CARDIOVASCULAR: No chest pain, palpitations, dizziness, or leg swelling  GASTROINTESTINAL: No abdominal or epigastric pain. No nausea, vomiting, or hematemesis; No diarrhea or constipation. No melena or hematochezia.  GENITOURINARY: No dysuria, frequency, hematuria, or incontinence  NEUROLOGICAL: No headaches, memory loss, loss of strength, numbness, or tremors  SKIN: No itching, burning, rashes, or lesions   LYMPH NODES: No enlarged glands  ENDOCRINE: No heat or cold intolerance; No hair loss  MUSCULOSKELETAL: No joint pain or swelling; No muscle, back, or extremity pain  PSYCHIATRIC: No depression, anxiety, mood swings, or difficulty sleeping  HEME/LYMPH: No easy bruising, or bleeding gums  ALLERY AND IMMUNOLOGIC: No hives or eczema    Vital Signs Last 24 Hrs  T(C): 36.9 (14 Oct 2024 19:00), Max: 36.9 (14 Oct 2024 15:00)  T(F): 98.4 (14 Oct 2024 19:00), Max: 98.4 (14 Oct 2024 15:00)  HR: 110 (14 Oct 2024 19:00) (90 - 122)  BP: 90/60 (14 Oct 2024 19:00) (81/38 - 139/70)  BP(mean): 71 (14 Oct 2024 19:00) (52 - 71)  RR: 20 (14 Oct 2024 19:00) (18 - 30)  SpO2: 100% (14 Oct 2024 19:00) (92% - 100%)    Parameters below as of 14 Oct 2024 19:00  Patient On (Oxygen Delivery Method): nasal cannula  O2 Flow (L/min): 2        PHYSICAL EXAM:  GENERAL: NAD, well-groomed, well-developed  HEAD:  Atraumatic, Normocephalic  EYES: EOMI, PERRLA, conjunctiva and sclera clear  ENMT: No tonsillar erythema, exudates, or enlargement; Moist mucous membranes, Good dentition, No lesions  NECK: Supple, No JVD, Normal thyroid  NERVOUS SYSTEM:  Alert & Oriented X3, Good concentration; Motor Strength 5/5 B/L upper and lower extremities; DTRs 2+ intact and symmetric  CHEST/LUNG: Clear to percussion bilaterally; No rales, rhonchi, wheezing, or rubs  HEART: Regular rate and rhythm; No murmurs, rubs, or gallops  ABDOMEN: Soft, Nontender, Nondistended; Bowel sounds present  EXTREMITIES:  2+ Peripheral Pulses, No clubbing, cyanosis, or edema  LYMPH: No lymphadenopathy noted  SKIN: No rashes or lesions      INTERPRETATION OF TELEMETRY:    [facility]    [facility]     LABS:                        12.7   7.96  )-----------( 57       ( 14 Oct 2024 06:40 )             37.8     10-14    147[H]  |  118[H]  |  21[H]  ----------------------------<  131[H]  3.8   |  24  |  1.02    Ca    7.6[L]      14 Oct 2024 17:00  Phos  1.9     10-14  Mg     2.0     10-14    TPro  4.6[L]  /  Alb  1.8[L]  /  TBili  1.1  /  DBili  x   /  AST  59[H]  /  ALT  36  /  AlkPhos  63  10-14          Urinalysis Basic - ( 14 Oct 2024 17:00 )    Color: x / Appearance: x / SG: x / pH: x  Gluc: 131 mg/dL / Ketone: x  / Bili: x / Urobili: x   Blood: x / Protein: x / Nitrite: x   Leuk Esterase: x / RBC: x / WBC x   Sq Epi: x / Non Sq Epi: x / Bacteria: x      Lipid Panel:   I&O's Summary    13 Oct 2024 07:01 -  14 Oct 2024 07:00  --------------------------------------------------------  IN: 1150 mL / OUT: 350 mL / NET: 800 mL    14 Oct 2024 07:01  -  14 Oct 2024 23:07  --------------------------------------------------------  IN: 450 mL / OUT: 0 mL / NET: 450 mL        RADIOLOGY & ADDITIONAL STUDIES:

## 2024-10-14 NOTE — PROGRESS NOTE ADULT - ATTENDING COMMENTS
85yo m w pmh htn, hld, pad s/p le stents, bph, hiv, p/w ams following presumed unwitnessed fall; in er, found to be meeting severe sirs/sepsis criteria + in ahrf req 3 lpm via nc; suspect 2/2 aspiration iso fall, dehydration  Responsive 83 yo man, supine in bed, NAD  Vital Signs Last 24 Hrs  T(C): 36.9 (14 Oct 2024 19:00), Max: 36.9 (14 Oct 2024 15:00)  T(F): 98.4 (14 Oct 2024 19:00), Max: 98.4 (14 Oct 2024 15:00)  HR: 110 (14 Oct 2024 19:00) (90 - 122)  BP: 90/60 (14 Oct 2024 19:00) (81/38 - 139/70)  BP(mean): 71 (14 Oct 2024 19:00) (52 - 71)  RR: 20 (14 Oct 2024 19:00) (18 - 30)  SpO2: 100% (14 Oct 2024 19:00) (92% - 100%)  Parameters below as of 14 Oct 2024 19:00  Patient On (Oxygen Delivery Method): nasal cannula  O2 Flow (L/min): 2  Lungs, bilateral rhonchi  Cor, RRR  Abdomen, soft + hepatomegaly  Ext: edema of RUE and RLE, skin breakdown, right (may be portal of entry for bacteremia)  Neurological, responsive, oriented to person, not place or time                      right facial droop                      right hemiparesis  Blood cultures positive for Aerococcus urinae  < from: Xray Chest 1 View AP/PA (10.14.24 @ 09:00) >  There is a moderate left base effusion. There is a loculated effusion and   minor fissure.    These basilar findings have increased from October 12.    IMPRESSION: Increasing bibasilar fluid accumulations.    Impression: Normal CT of the brain.                        12.7   7.96  )-----------( 57       ( 14 Oct 2024 06:40 )             37.8   10-14    147[H]  |  118[H]  |  21[H]  ----------------------------<  131[H]  3.8   |  24  |  1.02    Ca    7.6[L]      14 Oct 2024 17:00  Phos  1.9     10-14  Mg     2.0     10-14    TPro  4.6[L]  /  Alb  1.8[L]  /  TBili  1.1  /  DBili  x   /  AST  59[H]  /  ALT  36  /  AlkPhos  63  10-14    laLactate, Blood: 1.5 mmol/L (10.14.24 @ 06:40)   Vitamin B12, Serum: >2000:  tThyroid Stimulating Hormone, Serum: 2.25 uU/mL (10.14.24 @ 06:40)     IMP:   ahrf secondary to pneumonia  Severe sirs/sepsis, gram positive cocci, Aerococcus urinae, possibe portal of entry from abraided skin on legs or scalp, though usually a urinary pathogen.   Leukocytosis, tachycardic + lactic acidosis; meets severe sepsis criteria, secondary to A. urinae bacteremia, possible endocarditis with septic pulmonary emboli  hypernatremia secondary to dehydration  rhabdomyolysis  Right hemiparesis, r/o CNS emboli vs CNS mass  h/o PAD with stents  demand ischemia    Plan: ID consultation, appreciated. Adjust antibiotics, if A. urinae is susceptible          repeat cultures in AM          Repeat CT or MRI to r/o SOL, if CD4 is < 100.           Echo          nutrition consultation          f/u HIV VL and CD4, resume ARV, when able to swallow          pt/ot eval + sw/cm consult for disposition          Cardiology consultation, Dr. Daniel. 85yo m w pmh htn, hld, pad s/p le stents, bph, hiv, p/w ams following presumed unwitnessed fall; in er, found to be meeting severe sirs/sepsis criteria + in ahrf req 3 lpm via nc; suspect 2/2 aspiration iso fall, dehydration  Responsive 85 yo man, supine in bed, NAD  Vital Signs Last 24 Hrs  T(C): 36.9 (14 Oct 2024 19:00), Max: 36.9 (14 Oct 2024 15:00)  T(F): 98.4 (14 Oct 2024 19:00), Max: 98.4 (14 Oct 2024 15:00)  HR: 110 (14 Oct 2024 19:00) (90 - 122)  BP: 90/60 (14 Oct 2024 19:00) (81/38 - 139/70)  BP(mean): 71 (14 Oct 2024 19:00) (52 - 71)  RR: 20 (14 Oct 2024 19:00) (18 - 30)  SpO2: 100% (14 Oct 2024 19:00) (92% - 100%)  Parameters below as of 14 Oct 2024 19:00  Patient On (Oxygen Delivery Method): nasal cannula  O2 Flow (L/min): 2  Lungs, bilateral rhonchi  Cor, RRR  Abdomen, soft + hepatomegaly  Ext: edema of RUE and RLE, skin breakdown, right (may be portal of entry for bacteremia)  Neurological, responsive, oriented to person, not place or time                      right facial droop                      right hemiparesis  Blood cultures positive for Aerococcus urinae  < from: Xray Chest 1 View AP/PA (10.14.24 @ 09:00) >  There is a moderate left base effusion. There is a loculated effusion and   minor fissure.    These basilar findings have increased from October 12.    IMPRESSION: Increasing bibasilar fluid accumulations.    Impression: Normal CT of the brain.                        12.7   7.96  )-----------( 57       ( 14 Oct 2024 06:40 )             37.8   10-14    147[H]  |  118[H]  |  21[H]  ----------------------------<  131[H]  3.8   |  24  |  1.02    Ca    7.6[L]      14 Oct 2024 17:00  Phos  1.9     10-14  Mg     2.0     10-14    TPro  4.6[L]  /  Alb  1.8[L]  /  TBili  1.1  /  DBili  x   /  AST  59[H]  /  ALT  36  /  AlkPhos  63  10-14    laLactate, Blood: 1.5 mmol/L (10.14.24 @ 06:40)   Vitamin B12, Serum: >2000:  tThyroid Stimulating Hormone, Serum: 2.25 uU/mL (10.14.24 @ 06:40)     IMP:   ahrf secondary to pneumonia  Severe sirs/sepsis, gram positive cocci, Aerococcus urinae, possibe portal of entry from abraided skin on legs or scalp, though usually a urinary pathogen.   Leukocytosis, tachycardic + lactic acidosis; meets severe sepsis criteria, secondary to A. urinae bacteremia, possible endocarditis with septic pulmonary emboli  hypernatremia secondary to dehydration  rhabdomyolysis  Right hemiparesis, r/o CNS emboli vs CNS mass  h/o PAD with stents  demand ischemia  arrhythmia    Plan: ID consultation, appreciated. Adjust antibiotics, if A. urinae is susceptible          repeat cultures in AM          Repeat CT or MRI to r/o SOL, if CD4 is < 100.           Echo          nutrition consultation          f/u HIV VL and CD4, resume ARV, when able to swallow          pt/ot eval + sw/cm consult for disposition          Cardiology consultation, Dr. Daniel.

## 2024-10-14 NOTE — CONSULT NOTE ADULT - ASSESSMENT
Bacteremia with 2 species of Aerococcus ( both Viridans and Urinae )- ? real or Contamination   HIV +  Leukocytosis - normalized      Plan - Cont Vancomycin 1gm iv q12hrs  Cont Rocephin but increase to 2gms iv q24hrs  There are reports of Aerococcus Viridans being resistant to Rocephin / PCN etc hence will need to be on Vancomycin  Hold his HIV meds for now  Repeat Blood cultures  Will need a HEATHER and if negative then will give short course therapy of 2 weeks of IV antibiotics ( Vanco)  if any evidence of Endocarditis then will need 4 weeks of IV antibiotics.

## 2024-10-14 NOTE — PROGRESS NOTE ADULT - PROBLEM SELECTOR PLAN 5
intermittent episodes of short runs of non sustained V-tac on tele, 4-6 beats at a time  -A-Fib 100-120bpm on tele, denies CP, palpitations, SOB  -Pt. is not on AC at this time  -Consider cardiology consult

## 2024-10-14 NOTE — CHART NOTE - NSCHARTNOTEFT_GEN_A_CORE
Notified by RN that pt had 6 beats of wide QRS complex , asymptomatic, another episode of 6 beats of wide QRS wide complex es noted at 22: 11 , pt has multifocal pneumonia, Bcx both sets are growing bacterias , awaiting for species and 84-year-old man with hypertension, HIV, BPH coming after his daughter found him on the floor.  She had not been able to get in touch with him for 3 days.  he met  SIRS criteria on admission and likely has aspiration pneumonia with mild rhabdo. Night 10/13 - 10/14 notified by RN that pt had 6 beats of wide QRS complex , asymptomatic, another episode of 6 beats of wide QRS wide complex es noted at 22: 11 , pt has multifocal pneumonia, Bcx both sets are growing  gram positive cocci , awaiting for sensitivities, pt on abx CTX and Vancomycin ,   hypernatremia secondary to dehydration, trending lactate, pt on 2 L NC, ordered full rvp, procalcitonin, mrsa screen, sputum cultures f/u on atypical pna work up. Chest xray from 10/12 showed Hazy bibasilar opacities with a left lower lung zone dense opacity. Findings suggestive of multifocal pneumonia. Chest PT/ airway clearance ordered, Mucinex , Tessalon pearls ordered, . EKG done at 22: 45 sinus tachy with PAC , left axias deviation , nonspecific ST and T wave abnormality , to trend CPK, f/u orthostatic BP , pt ordered, blood work repeated since labs wrongly showed abnormal parameters ( incorrect potassium 2.2 and proper 3.5 before potassium given to pt , calcium 5- proper level 8.2 ). Potassium 3 KCL IV 10 meq x 3 given to pt and 40 meq po powder given to pt , to recheck potasium in am , pt had rhonhi in the morning , duonebs given to pt, sputum collect and sent, chest xray ordered for pt , endorsed to day ACP to f/u on xray . After duonebs slight improve in breathing nd breath sounds, endorsed to day ACP to monitor pt .     Vital Signs Last 24 Hrs  T(C): 36.7 (14 Oct 2024 07:36), Max: 36.9 (13 Oct 2024 11:04)  T(F): 98 (14 Oct 2024 07:36), Max: 98.5 (13 Oct 2024 11:04)  HR: 99 (14 Oct 2024 07:36) (98 - 122)  BP: 100/58 (14 Oct 2024 07:36) (100/58 - 139/70)  BP(mean): --  RR: 20 (14 Oct 2024 07:36) (18 - 30)  SpO2: 98% (14 Oct 2024 07:36) (76% - 99%)    Parameters below as of 14 Oct 2024 07:36  Patient On (Oxygen Delivery Method): nasal cannula  O2 Flow (L/min): 2    Karena Chirinos NP-C  Medicine Department   [facility]

## 2024-10-14 NOTE — PROGRESS NOTE ADULT - PROBLEM SELECTOR PLAN 2
due to bacteremia and PNA  -HR max 115, requiring 3L nasal cannula, white count 13, plt 82, lactate 2.3>2.9 on admission  -UA negative  -CXR with bilateral infiltrates   -Blood cultures grew Aerococcus viridans  -Procal elevated at 1.43  -Will continue with ceftriaxone and azithromycin  -Cont IVF hydration  -Required NS bolus 1000 ml for soft b/p and tachycardia  -Lactate 1.5 now  -f/u B/C sens  -f/u repeat B/C

## 2024-10-14 NOTE — CONSULT NOTE ADULT - SUBJECTIVE AND OBJECTIVE BOX
Wound SURGERY CONSULT NOTE    HPI:  84-year-old man from home with hypertension, PVD, BPH, HIV (on Genvoya??) coming with his daughter after she found him on the floor.  She had been trying to get in touch with him for 3 days but was unable so came up to [place] from [place], where she had recently moved, and found him on the floor covered in feces and urine.  He was confused at the time.  He does not recall details about how he got to the floor.  Per daughter, he is independent and mentate as well at baseline. On exam, he appears to be A&O x 3 but he is very difficult to understand.  He appears very dry and sounds like he is swallowing his tongue.  His daughter has difficulty understanding him as well.    Exam is notable for very dry appearance, garbled speech, rhonchi on all anterior lung fields, saturating 88% on room air and requiring 3 L nasal cannula, bilateral lower extremity pitting edema with old and new wounds on the legs, and an abrasion on the forehead.     ED Course  Vitals: HRmax 115, O2 88% on RA, requiring 3L NC  Labs: WBC 13, plt 82, trops 550>538, lactate 2.3>2.9, BUN/Cr 39/1.26, gluc 64, , UA with RBC, granular casts, protein  Intervention: s/p zosyn, 2.5L IVF  Consults:   Images: CTH wnl; CXR with bl LL consolidations on wet read     (12 Oct 2024 21:33)        pt denies N/V/D,  BM/ Flatus,       pt denies palp/ sob/dyspnea/ cp,     pt denies  Fever/Chills/Sweats    Wound consult requested to assist w/ management of multiple wounds: Right lateral and posterior leg ulcers, Left 2nd and third toe blisters, Coccyx DTI, Right ischium DTI,   Left ischium pressure ulcer unstegable, Scrotum IAD.   c/o pain, drainage, color change,  worsening swelling, non healing wounds. . Increasingly sedentary 2/to illness.  Incontinent of urine & stool.  H/o falls, trauma. Allevyn while awaiting consult.  Appetite fair, denies weight loss.   All questions asked and answered to pt's  satisfaction.    Current Diet:     PAST MEDICAL & SURGICAL HISTORY:  HIV (human immunodeficiency virus infection)  Umbilical hernia  Essential hypertension  Cardiomegaly  Elevated PSA  Urge incontinence  PVD (peripheral vascular disease)  Shingles  Enlarged prostate  Unilateral inguinal hernia, without obstruction or gangrene, not specified as recurrent  S/P hernia repair  right inguinal - 2013  History of cystoscopy  with photovaporization, green light laser lithotripsy 2016  PVD (peripheral vascular disease)  bilateral leg stents 2020  S/P cataract surgery      REVIEW OF SYSTEMS: Pt unable to offer  General/ Breast/ Skin/ Neuro/ MSK: see HPI  All other systems negative    MEDICATIONS  (STANDING):  albuterol/ipratropium for Nebulization 3 milliLiter(s) Nebulizer every 6 hours  cefTRIAXone   IVPB 1000 milliGRAM(s) IV Intermittent every 24 hours  cilostazol 100 milliGRAM(s) Oral two times a day  clopidogrel Tablet 75 milliGRAM(s) Oral daily  heparin   Injectable 5000 Unit(s) SubCutaneous every 12 hours  influenza  Vaccine (HIGH DOSE) 0.5 milliLiter(s) IntraMuscular once  NIFEdipine XL 30 milliGRAM(s) Oral daily  rosuvastatin 20 milliGRAM(s) Oral at bedtime  sodium chloride 0.45%. 1000 milliLiter(s) (80 mL/Hr) IV Continuous <Continuous>  sodium chloride 0.9%. 1000 milliLiter(s) (75 mL/Hr) IV Continuous <Continuous>  tamsulosin 0.4 milliGRAM(s) Oral at bedtime  vancomycin  IVPB      vancomycin  IVPB 1000 milliGRAM(s) IV Intermittent every 12 hours    MEDICATIONS  (PRN):  aluminum hydroxide/magnesium hydroxide/simethicone Suspension 30 milliLiter(s) Oral every 4 hours PRN Dyspepsia  benzonatate 100 milliGRAM(s) Oral every 8 hours PRN Cough  guaiFENesin  milliGRAM(s) Oral every 12 hours PRN Cough  melatonin 3 milliGRAM(s) Oral at bedtime PRN Insomnia  ondansetron Injectable 4 milliGRAM(s) IV Push every 8 hours PRN Nausea and/or Vomiting      Allergies  No Known Allergies    SOCIAL HISTORY:  lives home alone; Denies smoking, ETOH, drugs    FAMILY HISTORY:  No pertinent family history in first degree relatives    PHYSICAL EXAM:  Vital Signs Last 24 Hrs  T(C): 36.7 (14 Oct 2024 07:36), Max: 36.9 (13 Oct 2024 11:04)  T(F): 98 (14 Oct 2024 07:36), Max: 98.5 (13 Oct 2024 11:04)  HR: 99 (14 Oct 2024 07:36) (98 - 122)  BP: 100/58 (14 Oct 2024 07:36) (100/58 - 139/70)  BP(mean): --  RR: 20 (14 Oct 2024 07:36) (18 - 30)  SpO2: 98% (14 Oct 2024 07:36) (76% - 99%)    Parameters below as of 14 Oct 2024 07:36  Patient On (Oxygen Delivery Method): nasal cannula  O2 Flow (L/min): 2      Constitutional: NAD / gaurded but stable,  A&Ox3/ Alert/ Confused at times  well nourished  (+) low airloss support surface, (+) fluidized postioining devices, (+) complete cair boots   HEENT:  NC/AT, PERRL, EOMI, mucosa moist, throat clear, trachea midline, neck supple  Cardiovascular: RRR   Respiratory: CTA  Gastrointestinal soft NT/ND (+)BS   Neurology  weakened strength & sensation grossly intact  pt able to follow commands  Musculoskeletal:  limited/ FROM, no deformities/ contractures  Vascular: BLE equally warm/ cool,  no cyanosis, clubbing, edema  BLE edema equal  DP/PT pulses non palpable  no acute ischemia noted  hemosiderin staining    Skin:  moist w/ good turgor, frail  Right lateral leg ulcer- 2X1X0.1cm, wound covered in yellow slough, perwiound intact, non cellulitic, serosanguinous drainage, likely venous ulcer  Right posterior leg ulcer- 3X2X0.1, superficial, wound bed granular, non cellulitic, scant serosanguinous drainage, periwound intact, likely venous ulcers  Left 2nd and 3rd toe tip blisters  Coccyx DTI- 4X3,  perwiound intact, non cellulitic, no drainage  Right Ischium DTI- 2X2,  perwiound intact, non cellulitic, no drainage  Left Ischium Unstegable- 3X3X0.1, wound bed covered in brown eschar, scant serosanguinous drainage, periwound intact, non cellulitic  Scrotum IAD  No odor, erythema, increased warmth, tenderness, induration, fluctuance    LABS/ CULTURES/ RADIOLOGY:                        12.7   7.96  )-----------( 57       ( 14 Oct 2024 06:40 )             37.8       148  |  117  |  22  ----------------------------<  96      [10-14-24 @ 06:40]  3.8   |  26  |  0.86        Ca     8.2     [10-14-24 @ 06:40]      Mg     2.2     [10-14-24 @ 06:40]      Phos  2.4     [10-14-24 @ 06:40]    TPro  4.6  /  Alb  1.8  /  TBili  1.1  /  DBili  x   /  AST  59  /  ALT  36  /  AlkPhos  63  [10-14-24 @ 06:40]    PT/INR: PT 13.7 , INR 1.17       [10-12-24 @ 15:15]  PTT: 33.3       [10-12-24 @ 15:15]          [10-14-24 @ 06:40]        Culture - Blood (collected 10-12-24 @ 15:20)  Source: .Blood BLOOD  Gram Stain (10-13-24 @ 13:14):    Growth in aerobic bottle: Gram Positive Cocci in Clusters    Growth in anaerobic bottle: Gram Positive Cocci in Clusters  Preliminary Report (10-13-24 @ 13:14):    Growth in aerobic bottle: Gram Positive Cocci in Clusters    Growth in anaerobic bottle: Gram Positive Cocci in Clusters    Culture - Blood (collected 10-12-24 @ 15:15)  Source: .Blood BLOOD  Gram Stain (10-13-24 @ 13:15):    Growth in aerobic bottle: Gram Positive Cocci in Clusters    Growth in anaerobic bottle: Gram positive cocci in pairs  Final Report (10-14-24 @ 09:26):    Growth in aerobic and anaerobic bottles: Aerococcus viridans    Growth in anaerobic bottle: Aerococcus urinae    Isolates of Aerococcus spp. are predictably susceptible to penicillin,    ampicillin, and vancomycin. All isolates are resistant to sulfonamides.    Direct identification is available within approximately 3-5    hours either by Blood Panel Multiplexed PCR or Direct    MALDI-TOF. Details: [email or web address]  Organism: Blood Culture PCR  Blood Culture PCR (10-14-24 @ 09:26)  Organism: Blood Culture PCR (10-14-24 @ 09:26)      Method Type: PCR      -  Blood PCR Panel: NEG  Organism: Blood Culture PCR (10-14-24 @ 09:26)      Method Type: PCR      -  Blood PCR Panel: NEG                      Assessment/Plan:    Wound Consult requested to assist w/ management of    Compression BLE  Consider CRISTIAN/PVR, XRay, Duplex, MRI, CT  BLE elevation  Abx per Medicine/ ID  Moisturize intact skin w/ SWEEN cream BID  Nutrition Consult for optimization as tolerated in pt w/ Protein Calorie Malnutirion        Inadequate PO intake        consider MVI & Vit C to promote wound healing        encourage high quality protein when appropriate  Hyperglycemia - consider HgA1c        FS w/ ISS q6h, consider Endo Consult  Anemia- transfusions, Fe studies  Continue turning and positioning w/ offloading assistive devices as per protocol  Continue w/ Pericare as per protocol  Waffle Cushion to chair when oob to chair  Continue w/ low air loss fluidized bed surface     Care as per medicine, will follow w/ you    Upon discharge f/u as outpatient at [facility] [address] [phone]  Seen w/ attng and D/w team    Upon discharge f/u as outpatient at [facility] [address] [phone]/ [facility]  [address] 2nd floor [phone]      Thank you for this consult  JUAN Murry-BC, (Teams/ [id number])    If after 4PM or before 7:30AM on Mon-Friday or weekend/holiday please contact general surgery for urgent matters.    For non-urgent matters e-mail [email or web address]    I/We spent (50min) minutes face to face with this patient of which more than 50% of the time was spent counseling & coordinating care of this pt

## 2024-10-14 NOTE — CONSULT NOTE ADULT - RESPIRATORY
no wheezes/no rales/breath sounds equal/good air movement/rhonchi Implemented All Universal Safety Interventions:  [place] to call system. Call bell, personal items and telephone within reach. Instruct patient to call for assistance. Room bathroom lighting operational. Non-slip footwear when patient is off stretcher. Physically safe environment: no spills, clutter or unnecessary equipment. Stretcher in lowest position, wheels locked, appropriate side rails in place.

## 2024-10-14 NOTE — PROGRESS NOTE ADULT - ASSESSMENT
84-year-old man from home with hypertension, PVD, BPH, HIV (on Genvoya??) coming with his daughter after she found him on the floor.  She had been trying to get in touch with him for 3 days but was unable so came up to [place] from [place], where she had recently moved, and found him on the floor covered in feces and urine.  He was confused at the time.  He does not recall details about how he got to the floor.  Per daughter, he is independent and mentate as well at baseline. .  He appears very dry and sounds like he is swallowing his tongue.  His daughter has difficulty understanding him as well.      ED Course    Vitals: HRmax 115, O2 88% on RA, requiring 3L NC  Labs: WBC 13, plt 82, trops 550>538, lactate 2.3>2.9, BUN/Cr 39/1.26, gluc 64, , UA with RBC, granular casts, protein  Intervention: s/p zosyn, 2.5L IVF  Consults:   Images: CTH wnl; CXR with bl LL consolidations on wet read       MED REC TO BE COMPLETED IN AM

## 2024-10-14 NOTE — PROGRESS NOTE ADULT - PROBLEM SELECTOR PLAN 4
on admission-Trending down-->416-->299  -Likely in the setting of dehydration  -Received 2.5 L IVF in the ER  -Continue with maintenance IVF 75 cc an hour  -Received 1liter IVF bolus today  -f/u am CPK

## 2024-10-14 NOTE — CONSULT NOTE ADULT - SUBJECTIVE AND OBJECTIVE BOX
HPI:  84-year-old man from home with hypertension, PVD, BPH, HIV (on Genvoya??) coming with his daughter after she found him on the floor.  She had been trying to get in touch with him for 3 days but was unable so came up to [place] from [place], where she had recently moved, and found him on the floor covered in feces and urine.  He was confused at the time.  He does not recall details about how he got to the floor.  Per daughter, he is independent and mentate as well at baseline. On exam, he appears to be A&O x 3 but he is very difficult to understand.  He appears very dry and sounds like he is swallowing his tongue.  His daughter has difficulty understanding him as well.    Exam is notable for very dry appearance, garbled speech, rhonchi on all anterior lung fields, saturating 88% on room air and requiring 3 L nasal cannula, bilateral lower extremity pitting edema with old and new wounds on the legs, and an abrasion on the forehead.     ED Course    Vitals: HRmax 115, O2 88% on RA, requiring 3L NC  Labs: WBC 13, plt 82, trops 550>538, lactate 2.3>2.9, BUN/Cr 39/1.26, gluc 64, , UA with RBC, granular casts, protein  Intervention: s/p zosyn, 2.5L IVF  Consults:   Images: CTH wnl; CXR with bl LL consolidations on wet read              PAST MEDICAL & SURGICAL HISTORY:  HIV (human immunodeficiency virus infection)      Umbilical hernia      Essential hypertension      Cardiomegaly      Elevated PSA      Urge incontinence      PVD (peripheral vascular disease)      Shingles      Enlarged prostate      Unilateral inguinal hernia, without obstruction or gangrene, not specified as recurrent      S/P hernia repair  right inguinal - 2013      History of cystoscopy  with photovaporization, green light laser lithotripsy 2016      PVD (peripheral vascular disease)  bilateral leg stents 2020      S/P cataract surgery          No Known Allergies      Meds:  albuterol/ipratropium for Nebulization 3 milliLiter(s) Nebulizer every 6 hours  aluminum hydroxide/magnesium hydroxide/simethicone Suspension 30 milliLiter(s) Oral every 4 hours PRN  benzonatate 100 milliGRAM(s) Oral every 8 hours PRN  cefTRIAXone   IVPB 1000 milliGRAM(s) IV Intermittent every 24 hours  cilostazol 100 milliGRAM(s) Oral two times a day  clopidogrel Tablet 75 milliGRAM(s) Oral daily  guaiFENesin  milliGRAM(s) Oral every 12 hours PRN  heparin   Injectable 5000 Unit(s) SubCutaneous every 12 hours  influenza  Vaccine (HIGH DOSE) 0.5 milliLiter(s) IntraMuscular once  melatonin 3 milliGRAM(s) Oral at bedtime PRN  NIFEdipine XL 30 milliGRAM(s) Oral daily  ondansetron Injectable 4 milliGRAM(s) IV Push every 8 hours PRN  rosuvastatin 20 milliGRAM(s) Oral at bedtime  sodium chloride 0.45%. 1000 milliLiter(s) IV Continuous <Continuous>  sodium chloride 0.9% Bolus 1000 milliLiter(s) IV Bolus once  sodium chloride 0.9%. 1000 milliLiter(s) IV Continuous <Continuous>  tamsulosin 0.4 milliGRAM(s) Oral at bedtime  vancomycin  IVPB      vancomycin  IVPB 1000 milliGRAM(s) IV Intermittent every 12 hours      SOCIAL HISTORY:  Smoker:  YES / NO        PACK YEARS:                         WHEN QUIT?  ETOH use:  YES / NO               FREQUENCY / QUANTITY:  Ilicit Drug use:  YES / NO  Occupation:  Assisted device use (Cane / Walker):  Live with:    FAMILY HISTORY:  No pertinent family history in first degree relatives        VITALS:  Vital Signs Last 24 Hrs  T(C): 36.9 (14 Oct 2024 15:00), Max: 36.9 (14 Oct 2024 15:00)  T(F): 98.4 (14 Oct 2024 15:00), Max: 98.4 (14 Oct 2024 15:00)  HR: 118 (14 Oct 2024 17:15) (90 - 122)  BP: 101/52 (14 Oct 2024 17:15) (81/38 - 139/70)  BP(mean): 66 (14 Oct 2024 17:15) (52 - 66)  RR: 20 (14 Oct 2024 15:00) (18 - 30)  SpO2: 92% (14 Oct 2024 15:00) (76% - 99%)    Parameters below as of 14 Oct 2024 15:00  Patient On (Oxygen Delivery Method): nasal cannula  O2 Flow (L/min): 2      LABS/DIAGNOSTIC TESTS:                          12.7   7.96  )-----------( 57       ( 14 Oct 2024 06:40 )             37.8     WBC Count: 7.96 K/uL (10-14 @ 06:40)  WBC Count: 8.92 K/uL (10-13 @ 13:15)  WBC Count: 10.72 K/uL (10-13 @ 07:05)  WBC Count: 13.31 K/uL (10-12 @ 15:15)      10-14    148[H]  |  117[H]  |  22[H]  ----------------------------<  96  3.8   |  26  |  0.86    Ca    8.2[L]      14 Oct 2024 06:40  Phos  2.4     10-14  Mg     2.2     10-14    TPro  4.6[L]  /  Alb  1.8[L]  /  TBili  1.1  /  DBili  x   /  AST  59[H]  /  ALT  36  /  AlkPhos  63  10-14      Urine Microscopic-Add On (NC) (10.12.24 @ 16:00)   Red Blood Cell - Urine: 8 /HPF  Squamous Epithelial Cells: Present  Bacteria: Too Numerous to count /HPF  Granular Cast: Present  White Blood Cell - Urine: 3 /HPFUrinalysis with Rflx Culture (10.12.24 @ 16:00)   Urine Appearance: Cloudy  Color: Dark Yellow  Specific Gravity: 1.025  pH Urine: 5.5  Protein, Urine: 100 mg/dL  Glucose Qualitative, Urine: Negative mg/dL  Ketone - Urine: 15 mg/dL  Blood, Urine: Moderate  Bilirubin: Negative  Urobilinogen: 1.0 mg/dL  Leukocyte Esterase Concentration: Negative  Nitrite: Negative      LIVER FUNCTIONS - ( 14 Oct 2024 06:40 )  Alb: 1.8 g/dL / Pro: 4.6 g/dL / ALK PHOS: 63 U/L / ALT: 36 U/L DA / AST: 59 U/L / GGT: x                 LACTATE:Lactate, Blood: 1.5 mmol/L (10-14 @ 06:40)      ABG - ABG - ( 12 Oct 2024 21:20 )  pH, Arterial: 7.42  pH, Blood: x     /  pCO2: 32    /  pO2: 73    / HCO3: 21    / Base Excess: -2.8  /  SaO2: 96                  CULTURES:   .Sputum Sputum  10-14 @ 06:14 --  --    Rare polymorphonuclear leukocytes per low power field  Rare Squamous epithelial cells per low power field  Rare Yeast like cells per oil power field      .Blood BLOOD  10-12 @ 15:20   Growth in aerobic bottle: Staphylococcus epidermidis  "Susceptibilities not performed"  Growth in aerobic and anaerobic bottles: Aerococcus viridans  Growth in anaerobic bottle: Aerococcus urinae  Isolates of Aerococcus spp. are predictably susceptible to penicillin,  ampicillin, and vancomycin. All isolates are resistant to sulfonamides.  --    Growth in aerobic bottle: Gram Positive Cocci in Clusters  Growth in anaerobic bottle: Gram Positive Cocci in Clusters      .Blood BLOOD  10-12 @ 15:15   Growth in aerobic and anaerobic bottles: Aerococcus viridans  Growth in anaerobic bottle: Aerococcus urinae  Isolates of Aerococcus spp. are predictably susceptible to penicillin,  ampicillin, and vancomycin. All isolates are resistant to sulfonamides.  Direct identification is available within approximately 3-5  hours either by Blood Panel Multiplexed PCR or Direct  MALDI-TOF. Details: [email or web address]  --  Blood Culture PCR  Blood Culture PCR          RADIOLOGY:< from: Xray Chest 1 View AP/PA (10.14.24 @ 09:00) >  ACC: [id number] EXAM:  XR CHEST AP OR PA 1V   ORDERED BY: SUJIT PARISH     PROCEDURE DATE:  10/14/2024          INTERPRETATION:  AP erect chest on October 14, 2024 at 8:32 AM. Patient   has rhonchi.    Heart difficult to assess.    There is a moderate left base effusion. There is a loculated effusion and   minor fissure.    These basilar findings have increased from October 12.    IMPRESSION: Increasing bibasilar fluid accumulations.    --- End of Report ---            KEIRA CHAN MD; Attending Radiologist  This document has been electronically signed. Oct 14 2024  3:12PM    < end of copied text >  ---------------------------------------------------------------------------------------------------------------------------  ACC: [id number] EXAM:  CT BRAIN   ORDERED BY: ESPERANZA LANDRUM     PROCEDURE DATE:  10/12/2024          INTERPRETATION:  CLINICAL INFORMATION: fakk unwitnessed    COMPARISON: None.    CONTRAST:  IV Contrast: NONE  Complications: None reportedat time of study completion    Technique: CT head was performed utilizing axial images from the base of   the skull through the vertex without the administration of intravenous   contrast. Images were reviewed in the bone, brain and subdural windows.    Findings:    There is no evidence of acute intracranial hemorrhage or acute   transcortical infarct.  The ventricles are normal in size and caliber.   There are patchy areas of low density within the periventricular white   matter consistent with mild-to-moderate microvascular disease. There is   enlargement of the sulci, cisterns and ventricles consistent with mild   cerebral and cerebellar volume loss. There is no evidence of   hydrocephalus.  There is no evidence of mass-effect or midline shift. There is no   evidence of an intra or extra-axial fluid collection.    Visualized paranasal sinuses and bilateral mastoid air cells are clear.   There is scalp and subcutaneous soft tissue swelling and edema at the   vertex and involving the posterior right parietal occipital and left   temporal parietal and occipital scalp. No acute calvarial fractures are   identified.    Impression: Normal CT of the brain.        --- End of Report ---            ROSA ESPOSITO MD; Attending Radiologist  This document has been electronically signed. Oct 12 2024  6:46PM    < end of copied text >        ROS  [  ] UNABLE TO ELICIT               HPI:  84-year-old man from home with hypertension, PVD, BPH, HIV (on Genvoya??) coming with his daughter after she found him on the floor.  She had been trying to get in touch with him for 3 days but was unable so came up to [place] from [place], where she had recently moved, and found him on the floor covered in feces and urine.  He was confused at the time.  He does not recall details about how he got to the floor.  Per daughter, he is independent and mentate as well at baseline. On exam, he appears to be A&O x 3 but he is very difficult to understand.  He appears very dry and sounds like he is swallowing his tongue.  His daughter has difficulty understanding him as well.    Exam is notable for very dry appearance, garbled speech, rhonchi on all anterior lung fields, saturating 88% on room air and requiring 3 L nasal cannula, bilateral lower extremity pitting edema with old and new wounds on the legs, and an abrasion on the forehead.     ED Course    Vitals: HRmax 115, O2 88% on RA, requiring 3L NC  Labs: WBC 13, plt 82, trops 550>538, lactate 2.3>2.9, BUN/Cr 39/1.26, gluc 64, , UA with RBC, granular casts, protein  Intervention: s/p zosyn, 2.5L IVF  Consults:   Images: CTH wnl; CXR with bl LL consolidations on wet read            History as above, asked to see this patient I know in the past and was my office patient 14 years ago, he has a H/O HIV for a few decades now and was always compliant with his meds in the past. He was brought in from home after being found on the floor by his daughter as he was not responding to her phone calls for 3 days and she found him lying in faeces and urine, hence we do not know how long he was on the floor for. Asked to see him as he was found to have Aerococcus Viridans and Urinae in his blood cultures, he also had Staph epi in his blood cultures. He is confused currently and is garbling his words a little and so unable to get any history from him. He was a smoker for many years in the past but does state that he quit when asked, not a known alcohol abuser. He was also found to be in A.Fib here.  No fevers here but his WBC count was slightly elevated on admission and has decreased to normal, his CXR is negative for infiltrates and his u/a shows no pyuria but a lot of bacteria.             PAST MEDICAL & SURGICAL HISTORY:  HIV (human immunodeficiency virus infection)      Umbilical hernia      Essential hypertension      Cardiomegaly      Elevated PSA      Urge incontinence      PVD (peripheral vascular disease)      Shingles      Enlarged prostate      Unilateral inguinal hernia, without obstruction or gangrene, not specified as recurrent      S/P hernia repair  right inguinal - 2013      History of cystoscopy  with photovaporization, green light laser lithotripsy 2016      PVD (peripheral vascular disease)  bilateral leg stents 2020      S/P cataract surgery          No Known Allergies      Meds:  albuterol/ipratropium for Nebulization 3 milliLiter(s) Nebulizer every 6 hours  aluminum hydroxide/magnesium hydroxide/simethicone Suspension 30 milliLiter(s) Oral every 4 hours PRN  benzonatate 100 milliGRAM(s) Oral every 8 hours PRN  cefTRIAXone   IVPB 1000 milliGRAM(s) IV Intermittent every 24 hours  cilostazol 100 milliGRAM(s) Oral two times a day  clopidogrel Tablet 75 milliGRAM(s) Oral daily  guaiFENesin  milliGRAM(s) Oral every 12 hours PRN  heparin   Injectable 5000 Unit(s) SubCutaneous every 12 hours  influenza  Vaccine (HIGH DOSE) 0.5 milliLiter(s) IntraMuscular once  melatonin 3 milliGRAM(s) Oral at bedtime PRN  NIFEdipine XL 30 milliGRAM(s) Oral daily  ondansetron Injectable 4 milliGRAM(s) IV Push every 8 hours PRN  rosuvastatin 20 milliGRAM(s) Oral at bedtime  sodium chloride 0.45%. 1000 milliLiter(s) IV Continuous <Continuous>  sodium chloride 0.9% Bolus 1000 milliLiter(s) IV Bolus once  sodium chloride 0.9%. 1000 milliLiter(s) IV Continuous <Continuous>  tamsulosin 0.4 milliGRAM(s) Oral at bedtime  vancomycin  IVPB      vancomycin  IVPB 1000 milliGRAM(s) IV Intermittent every 12 hours      SOCIAL HISTORY:  Smoker:  ex smoker  ETOH use:  no    FAMILY HISTORY:  No pertinent family history in first degree relatives        VITALS:  Vital Signs Last 24 Hrs  T(C): 36.9 (14 Oct 2024 15:00), Max: 36.9 (14 Oct 2024 15:00)  T(F): 98.4 (14 Oct 2024 15:00), Max: 98.4 (14 Oct 2024 15:00)  HR: 118 (14 Oct 2024 17:15) (90 - 122)  BP: 101/52 (14 Oct 2024 17:15) (81/38 - 139/70)  BP(mean): 66 (14 Oct 2024 17:15) (52 - 66)  RR: 20 (14 Oct 2024 15:00) (18 - 30)  SpO2: 92% (14 Oct 2024 15:00) (76% - 99%)    Parameters below as of 14 Oct 2024 15:00  Patient On (Oxygen Delivery Method): nasal cannula  O2 Flow (L/min): 2      LABS/DIAGNOSTIC TESTS:                          12.7   7.96  )-----------( 57       ( 14 Oct 2024 06:40 )             37.8     WBC Count: 7.96 K/uL (10-14 @ 06:40)  WBC Count: 8.92 K/uL (10-13 @ 13:15)  WBC Count: 10.72 K/uL (10-13 @ 07:05)  WBC Count: 13.31 K/uL (10-12 @ 15:15)      10-14    148[H]  |  117[H]  |  22[H]  ----------------------------<  96  3.8   |  26  |  0.86    Ca    8.2[L]      14 Oct 2024 06:40  Phos  2.4     10-14  Mg     2.2     10-14    TPro  4.6[L]  /  Alb  1.8[L]  /  TBili  1.1  /  DBili  x   /  AST  59[H]  /  ALT  36  /  AlkPhos  63  10-14      Urine Microscopic-Add On (NC) (10.12.24 @ 16:00)   Red Blood Cell - Urine: 8 /HPF  Squamous Epithelial Cells: Present  Bacteria: Too Numerous to count /HPF  Granular Cast: Present  White Blood Cell - Urine: 3 /HPFUrinalysis with Rflx Culture (10.12.24 @ 16:00)   Urine Appearance: Cloudy  Color: Dark Yellow  Specific Gravity: 1.025  pH Urine: 5.5  Protein, Urine: 100 mg/dL  Glucose Qualitative, Urine: Negative mg/dL  Ketone - Urine: 15 mg/dL  Blood, Urine: Moderate  Bilirubin: Negative  Urobilinogen: 1.0 mg/dL  Leukocyte Esterase Concentration: Negative  Nitrite: Negative      LIVER FUNCTIONS - ( 14 Oct 2024 06:40 )  Alb: 1.8 g/dL / Pro: 4.6 g/dL / ALK PHOS: 63 U/L / ALT: 36 U/L DA / AST: 59 U/L / GGT: x                 LACTATE:Lactate, Blood: 1.5 mmol/L (10-14 @ 06:40)      ABG - ABG - ( 12 Oct 2024 21:20 )  pH, Arterial: 7.42  pH, Blood: x     /  pCO2: 32    /  pO2: 73    / HCO3: 21    / Base Excess: -2.8  /  SaO2: 96                  CULTURES:   .Sputum Sputum  10-14 @ 06:14 --  --    Rare polymorphonuclear leukocytes per low power field  Rare Squamous epithelial cells per low power field  Rare Yeast like cells per oil power field      .Blood BLOOD  10-12 @ 15:20   Growth in aerobic bottle: Staphylococcus epidermidis  "Susceptibilities not performed"  Growth in aerobic and anaerobic bottles: Aerococcus viridans  Growth in anaerobic bottle: Aerococcus urinae  Isolates of Aerococcus spp. are predictably susceptible to penicillin,  ampicillin, and vancomycin. All isolates are resistant to sulfonamides.  --    Growth in aerobic bottle: Gram Positive Cocci in Clusters  Growth in anaerobic bottle: Gram Positive Cocci in Clusters      .Blood BLOOD  10-12 @ 15:15   Growth in aerobic and anaerobic bottles: Aerococcus viridans  Growth in anaerobic bottle: Aerococcus urinae  Isolates of Aerococcus spp. are predictably susceptible to penicillin,  ampicillin, and vancomycin. All isolates are resistant to sulfonamides.  Direct identification is available within approximately 3-5  hours either by Blood Panel Multiplexed PCR or Direct  MALDI-TOF. Details: [email or web address]  --  Blood Culture PCR  Blood Culture PCR          RADIOLOGY:< from: Xray Chest 1 View AP/PA (10.14.24 @ 09:00) >  ACC: [id number] EXAM:  XR CHEST AP OR PA 1V   ORDERED BY: SUJIT PARISH     PROCEDURE DATE:  10/14/2024          INTERPRETATION:  AP erect chest on October 14, 2024 at 8:32 AM. Patient   has rhonchi.    Heart difficult to assess.    There is a moderate left base effusion. There is a loculated effusion and   minor fissure.    These basilar findings have increased from October 12.    IMPRESSION: Increasing bibasilar fluid accumulations.    --- End of Report ---            KEIRA CHAN MD; Attending Radiologist  This document has been electronically signed. Oct 14 2024  3:12PM    < end of copied text >  ---------------------------------------------------------------------------------------------------------------------------  ACC: [id number] EXAM:  CT BRAIN   ORDERED BY: ESPERANZA LANDRUM     PROCEDURE DATE:  10/12/2024          INTERPRETATION:  CLINICAL INFORMATION: fakk unwitnessed    COMPARISON: None.    CONTRAST:  IV Contrast: NONE  Complications: None reportedat time of study completion    Technique: CT head was performed utilizing axial images from the base of   the skull through the vertex without the administration of intravenous   contrast. Images were reviewed in the bone, brain and subdural windows.    Findings:    There is no evidence of acute intracranial hemorrhage or acute   transcortical infarct.  The ventricles are normal in size and caliber.   There are patchy areas of low density within the periventricular white   matter consistent with mild-to-moderate microvascular disease. There is   enlargement of the sulci, cisterns and ventricles consistent with mild   cerebral and cerebellar volume loss. There is no evidence of   hydrocephalus.  There is no evidence of mass-effect or midline shift. There is no   evidence of an intra or extra-axial fluid collection.    Visualized paranasal sinuses and bilateral mastoid air cells are clear.   There is scalp and subcutaneous soft tissue swelling and edema at the   vertex and involving the posterior right parietal occipital and left   temporal parietal and occipital scalp. No acute calvarial fractures are   identified.    Impression: Normal CT of the brain.        --- End of Report ---            ROSA ESPOSITO MD; Attending Radiologist  This document has been electronically signed. Oct 12 2024  6:46PM    < end of copied text >        ROS  [ x ] UNABLE TO ELICIT

## 2024-10-14 NOTE — CONSULT NOTE ADULT - ASSESSMENT
84-year-old man from home with hypertension, PVD, BPH, HIV (on Genvoya??) coming with his daughter after she found him on the floor.  She had been trying to get in touch with him for 3 days but was unable so came up to [place] from [place], where she had recently moved, and found him on the floor covered in feces and urine.  He was confused at the time.  He does not recall details about how he got to the floor.  Per daughter, he is independent and mentate as well at baseline. .  He appears very dry and sounds like he is swallowing his tongue.  His daughter has difficulty understanding him as well.    : Bacteremia due to Gram-positive bacteria.   # sepsis  # PNA  # rhabdo  # cardiac arrythmia  - abx per ID  - f/u echo  - f/y cpk  -c/w IVF  - tele

## 2024-10-15 ENCOUNTER — RESULT REVIEW (OUTPATIENT)
Age: 84
End: 2024-10-15

## 2024-10-15 ENCOUNTER — TRANSCRIPTION ENCOUNTER (OUTPATIENT)
Age: 84
End: 2024-10-15

## 2024-10-15 DIAGNOSIS — I48.91 UNSPECIFIED ATRIAL FIBRILLATION: ICD-10-CM

## 2024-10-15 DIAGNOSIS — J96.01 ACUTE RESPIRATORY FAILURE WITH HYPOXIA: ICD-10-CM

## 2024-10-15 DIAGNOSIS — Z75.8 OTHER PROBLEMS RELATED TO MEDICAL FACILITIES AND OTHER HEALTH CARE: ICD-10-CM

## 2024-10-15 DIAGNOSIS — R79.89 OTHER SPECIFIED ABNORMAL FINDINGS OF BLOOD CHEMISTRY: ICD-10-CM

## 2024-10-15 LAB
4/8 RATIO: 0.6 RATIO — LOW (ref 0.86–4.14)
ABS CD8: 342 CELLS/UL — SIGNIFICANT CHANGE UP (ref 90–775)
ANION GAP SERPL CALC-SCNC: 4 MMOL/L — LOW (ref 5–17)
ANION GAP SERPL CALC-SCNC: 5 MMOL/L — SIGNIFICANT CHANGE UP (ref 5–17)
BUN SERPL-MCNC: 18 MG/DL — SIGNIFICANT CHANGE UP (ref 7–18)
BUN SERPL-MCNC: 18 MG/DL — SIGNIFICANT CHANGE UP (ref 7–18)
CALCIUM SERPL-MCNC: 7.7 MG/DL — LOW (ref 8.4–10.5)
CALCIUM SERPL-MCNC: 7.8 MG/DL — LOW (ref 8.4–10.5)
CD16+CD56+ CELLS NFR BLD: 9 % — SIGNIFICANT CHANGE UP (ref 7–27)
CD16+CD56+ CELLS NFR SPEC: 56 CELLS/UL — LOW (ref 80–426)
CD19 BLASTS SPEC-ACNC: 43 CELLS/UL — SIGNIFICANT CHANGE UP (ref 32–326)
CD19 BLASTS SPEC-ACNC: 7 % — SIGNIFICANT CHANGE UP (ref 4–18)
CD3 BLASTS SPEC-ACNC: 541 CELLS/UL — SIGNIFICANT CHANGE UP (ref 396–2024)
CD3 BLASTS SPEC-ACNC: 85 % — HIGH (ref 58–84)
CD4 %: 32 % — SIGNIFICANT CHANGE UP (ref 30–56)
CD8 %: 54 % — HIGH (ref 11–43)
CHLORIDE SERPL-SCNC: 117 MMOL/L — HIGH (ref 96–108)
CHLORIDE SERPL-SCNC: 117 MMOL/L — HIGH (ref 96–108)
CK SERPL-CCNC: 385 U/L — HIGH (ref 35–232)
CO2 SERPL-SCNC: 23 MMOL/L — SIGNIFICANT CHANGE UP (ref 22–31)
CO2 SERPL-SCNC: 25 MMOL/L — SIGNIFICANT CHANGE UP (ref 22–31)
CREAT SERPL-MCNC: 0.88 MG/DL — SIGNIFICANT CHANGE UP (ref 0.5–1.3)
CREAT SERPL-MCNC: 0.92 MG/DL — SIGNIFICANT CHANGE UP (ref 0.5–1.3)
CULTURE RESULTS: ABNORMAL
EGFR: 82 ML/MIN/1.73M2 — SIGNIFICANT CHANGE UP
EGFR: 85 ML/MIN/1.73M2 — SIGNIFICANT CHANGE UP
GLUCOSE SERPL-MCNC: 106 MG/DL — HIGH (ref 70–99)
GLUCOSE SERPL-MCNC: 113 MG/DL — HIGH (ref 70–99)
HCT VFR BLD CALC: 34 % — LOW (ref 39–50)
HGB BLD-MCNC: 11.3 G/DL — LOW (ref 13–17)
HIV-1 VIRAL LOAD RESULT: SIGNIFICANT CHANGE UP
HIV1 RNA # SERPL NAA+PROBE: SIGNIFICANT CHANGE UP COPIES/ML
HIV1 RNA SER-IMP: SIGNIFICANT CHANGE UP
HIV1 RNA SERPL NAA+PROBE-ACNC: SIGNIFICANT CHANGE UP
HIV1 RNA SERPL NAA+PROBE-LOG#: SIGNIFICANT CHANGE UP LG COP/ML
MAGNESIUM SERPL-MCNC: 2.1 MG/DL — SIGNIFICANT CHANGE UP (ref 1.6–2.6)
MAGNESIUM SERPL-MCNC: 2.1 MG/DL — SIGNIFICANT CHANGE UP (ref 1.6–2.6)
MCHC RBC-ENTMCNC: 31.9 PG — SIGNIFICANT CHANGE UP (ref 27–34)
MCHC RBC-ENTMCNC: 33.2 GM/DL — SIGNIFICANT CHANGE UP (ref 32–36)
MCV RBC AUTO: 96 FL — SIGNIFICANT CHANGE UP (ref 80–100)
NRBC # BLD: 0 /100 WBCS — SIGNIFICANT CHANGE UP (ref 0–0)
PHOSPHATE SERPL-MCNC: 2.2 MG/DL — LOW (ref 2.5–4.5)
PHOSPHATE SERPL-MCNC: 2.3 MG/DL — LOW (ref 2.5–4.5)
PLATELET # BLD AUTO: 55 K/UL — LOW (ref 150–400)
POTASSIUM SERPL-MCNC: 3.7 MMOL/L — SIGNIFICANT CHANGE UP (ref 3.5–5.3)
POTASSIUM SERPL-MCNC: 4.3 MMOL/L — SIGNIFICANT CHANGE UP (ref 3.5–5.3)
POTASSIUM SERPL-SCNC: 3.7 MMOL/L — SIGNIFICANT CHANGE UP (ref 3.5–5.3)
POTASSIUM SERPL-SCNC: 4.3 MMOL/L — SIGNIFICANT CHANGE UP (ref 3.5–5.3)
RBC # BLD: 3.54 M/UL — LOW (ref 4.2–5.8)
RBC # FLD: 14.6 % — HIGH (ref 10.3–14.5)
RPR SERPL-ACNC: SIGNIFICANT CHANGE UP
SODIUM SERPL-SCNC: 145 MMOL/L — SIGNIFICANT CHANGE UP (ref 135–145)
SODIUM SERPL-SCNC: 146 MMOL/L — HIGH (ref 135–145)
SPECIMEN SOURCE: SIGNIFICANT CHANGE UP
T PALLIDUM AB TITR SER: POSITIVE
T PALLIDUM IGG SER QL IF: ABNORMAL
T-CELL CD4 SUBSET PNL BLD: 205 CELLS/UL — LOW (ref 325–1251)
TROPONIN I, HIGH SENSITIVITY RESULT: 339.5 NG/L — HIGH
VANCOMYCIN TROUGH SERPL-MCNC: 15.7 UG/ML — SIGNIFICANT CHANGE UP (ref 10–20)
WBC # BLD: 7.67 K/UL — SIGNIFICANT CHANGE UP (ref 3.8–10.5)
WBC # FLD AUTO: 7.67 K/UL — SIGNIFICANT CHANGE UP (ref 3.8–10.5)

## 2024-10-15 PROCEDURE — 99233 SBSQ HOSP IP/OBS HIGH 50: CPT

## 2024-10-15 PROCEDURE — 99221 1ST HOSP IP/OBS SF/LOW 40: CPT

## 2024-10-15 RX ORDER — FOLIC ACID 1 MG/1
1 TABLET ORAL
Refills: 0 | Status: DISCONTINUED | OUTPATIENT
Start: 2024-10-15 | End: 2024-10-22

## 2024-10-15 RX ORDER — POTASSIUM PHOSPHATE 236; 224 MG/ML; MG/ML
15 INJECTION, SOLUTION INTRAVENOUS ONCE
Refills: 0 | Status: COMPLETED | OUTPATIENT
Start: 2024-10-15 | End: 2024-10-15

## 2024-10-15 RX ORDER — METOPROLOL TARTRATE 50 MG
25 TABLET ORAL
Refills: 0 | Status: DISCONTINUED | OUTPATIENT
Start: 2024-10-15 | End: 2024-10-22

## 2024-10-15 RX ORDER — ASCORBIC ACID 500 MG
500 TABLET ORAL DAILY
Refills: 0 | Status: DISCONTINUED | OUTPATIENT
Start: 2024-10-15 | End: 2024-10-22

## 2024-10-15 RX ORDER — SODIUM CHLORIDE 9 MG/ML
4 INJECTION, SOLUTION INTRAMUSCULAR; INTRAVENOUS; SUBCUTANEOUS EVERY 12 HOURS
Refills: 0 | Status: DISCONTINUED | OUTPATIENT
Start: 2024-10-15 | End: 2024-10-22

## 2024-10-15 RX ORDER — DIGOXIN 250 MCG
500 TABLET ORAL ONCE
Refills: 0 | Status: COMPLETED | OUTPATIENT
Start: 2024-10-15 | End: 2024-10-15

## 2024-10-15 RX ORDER — B-COMPLEX WITH VITAMIN C
1 VIAL (ML) INJECTION DAILY
Refills: 0 | Status: DISCONTINUED | OUTPATIENT
Start: 2024-10-15 | End: 2024-10-22

## 2024-10-15 RX ORDER — PANTOPRAZOLE SODIUM 40 MG/1
40 TABLET, DELAYED RELEASE ORAL
Refills: 0 | Status: DISCONTINUED | OUTPATIENT
Start: 2024-10-15 | End: 2024-10-22

## 2024-10-15 RX ADMIN — VANCOMYCIN HYDROCHLORIDE 250 MILLIGRAM(S): KIT at 01:15

## 2024-10-15 RX ADMIN — VANCOMYCIN HYDROCHLORIDE 250 MILLIGRAM(S): KIT at 12:28

## 2024-10-15 RX ADMIN — CLOPIDOGREL 75 MILLIGRAM(S): 75 TABLET ORAL at 12:28

## 2024-10-15 RX ADMIN — IPRATROPIUM BROMIDE AND ALBUTEROL SULFATE 3 MILLILITER(S): .5; 2.5 SOLUTION RESPIRATORY (INHALATION) at 20:01

## 2024-10-15 RX ADMIN — FOLIC ACID 1 MILLIGRAM(S): 1 TABLET ORAL at 18:03

## 2024-10-15 RX ADMIN — HEPARIN SODIUM 5000 UNIT(S): 10000 INJECTION INTRAVENOUS; SUBCUTANEOUS at 18:02

## 2024-10-15 RX ADMIN — Medication 1 TABLET(S): at 12:28

## 2024-10-15 RX ADMIN — Medication 25 MILLIGRAM(S): at 14:59

## 2024-10-15 RX ADMIN — Medication 100 MILLIGRAM(S): at 02:21

## 2024-10-15 RX ADMIN — IPRATROPIUM BROMIDE AND ALBUTEROL SULFATE 3 MILLILITER(S): .5; 2.5 SOLUTION RESPIRATORY (INHALATION) at 02:41

## 2024-10-15 RX ADMIN — IPRATROPIUM BROMIDE AND ALBUTEROL SULFATE 3 MILLILITER(S): .5; 2.5 SOLUTION RESPIRATORY (INHALATION) at 08:50

## 2024-10-15 RX ADMIN — HEPARIN SODIUM 5000 UNIT(S): 10000 INJECTION INTRAVENOUS; SUBCUTANEOUS at 05:44

## 2024-10-15 RX ADMIN — Medication 20 MILLIGRAM(S): at 22:52

## 2024-10-15 RX ADMIN — Medication 0.4 MILLIGRAM(S): at 22:52

## 2024-10-15 RX ADMIN — POTASSIUM PHOSPHATE 62.5 MILLIMOLE(S): 236; 224 INJECTION, SOLUTION INTRAVENOUS at 13:58

## 2024-10-15 RX ADMIN — IPRATROPIUM BROMIDE AND ALBUTEROL SULFATE 3 MILLILITER(S): .5; 2.5 SOLUTION RESPIRATORY (INHALATION) at 15:43

## 2024-10-15 RX ADMIN — Medication 500 MILLIGRAM(S): at 12:28

## 2024-10-15 RX ADMIN — SODIUM CHLORIDE 4 MILLILITER(S): 9 INJECTION, SOLUTION INTRAMUSCULAR; INTRAVENOUS; SUBCUTANEOUS at 15:43

## 2024-10-15 RX ADMIN — Medication 500 MICROGRAM(S): at 12:27

## 2024-10-15 RX ADMIN — CILOSTAZOL 100 MILLIGRAM(S): 100 TABLET ORAL at 05:45

## 2024-10-15 NOTE — PROGRESS NOTE ADULT - PROBLEM SELECTOR PLAN 9
f/u TTE  f/u Vanco trough in 10/16 11:00pm (23:00)  f/u doppler, CRISTIAN  f/u right foot x-ray  f/u speech and swallow evaluation  pending PT- daughter agrees to send patient to [facility] verbalizes does not have HIV meds  patient is undetected

## 2024-10-15 NOTE — DIETITIAN INITIAL EVALUATION ADULT - NSPROEDALEARNER_GEN_A_NUR
family PROVIDER:[TOKEN:[[id number]],SCHEDULEDAPPT:[04/17/2023],SCHEDULEDAPPTTIME:[11:30 AM]] PROVIDER:[TOKEN:[[id number]],SCHEDULEDAPPT:[04/13/2023],SCHEDULEDAPPTTIME:[11:30 AM]],PROVIDER:[[id number]],SCHEDULEDAPPT:[04/19/2023],SCHEDULEDAPPTTIME:[03:00 PM]]

## 2024-10-15 NOTE — CHART NOTE - NSCHARTNOTEFT_GEN_A_CORE
EVENT: Called by RN, Pt with 10 run of Vtach on telemetry.    HPI:  84-year-old man from home with hypertension, PVD, BPH, HIV (on Genvoya??) coming with his daughter after she found him on the floor.  She had been trying to get in touch with him for 3 days but was unable so came up to [place] from [place], where she had recently moved, and found him on the floor covered in feces and urine.  He was confused at the time.  He does not recall details about how he got to the floor.  Per daughter, he is independent and mentate as well at baseline. On exam, he appears to be A&O x 3 but he is very difficult to understand. He appears very dry and sounds like he is swallowing his tongue.  His daughter has difficulty understanding him as well.    Subjective: Pt seen and examined at bedside. Pt found sleeping, observed comfortably. When awaken, A&Ox 3, speech clear. Pt denies CP, Palpitations, dizziness at present time.    Vital Signs Last 24 Hrs  T(C): 36.4 (15 Oct 2024 23:33), Max: 36.7 (15 Oct 2024 19:24)  T(F): 97.5 (15 Oct 2024 23:33), Max: 98.1 (15 Oct 2024 19:24)  HR: 79 (15 Oct 2024 23:33) (69 - 147)  BP: 124/73 (15 Oct 2024 23:33) (96/62 - 132/76)  BP(mean): --  RR: 18 (15 Oct 2024 23:33) (18 - 20)  SpO2: 100% (15 Oct 2024 23:33) (95% - 100%)    Parameters below as of 15 Oct 2024 23:33  Patient On (Oxygen Delivery Method): nasal cannula  O2 Flow (L/min): 2    Focused PE  Neuro: Sleeping, easily arousable, A&Ox3, speech clear  Cardiovascular: + S1, S2,  Respiratory: even and unlabored  GI: Abdomen soft, non-tender, bowel sounds present   : Non distended;   Skin:  bilateral lower/upper extremity pitting edema with old and new wounds on the legs.    LABS:                        11.3   7.67  )-----------( 55       ( 15 Oct 2024 07:10 )             34.0     10-15    145  |  117[H]  |  18  ----------------------------<  106[H]  4.3   |  23  |  0.92    Ca    7.8[L]      15 Oct 2024 22:45  Phos  2.3     10-15  Mg     2.1     10-15    TPro  4.6[L]  /  Alb  1.8[L]  /  TBili  1.1  /  DBili  x   /  AST  59[H]  /  ALT  36  /  AlkPhos  63  10-14    Images: CTH wnl; CXR with bl LL consolidations on wet read     PLAN:   - BMP, Mg, Phosphorus labs ordered x 1 STAT.  - Continue Telemetry  - Cardiology following    FOLLOW UP:  - Labs reviewed, WNL  - Continue monitoring cardiac monitor

## 2024-10-15 NOTE — DISCHARGE NOTE PROVIDER - NSDCMRMEDTOKEN_GEN_ALL_CORE_FT
cilostazol 100 mg oral tablet: 1 tab(s) orally 2 times a day  clopidogrel 75 mg oral tablet: 1 tab(s) orally once a day  Genvoya oral tablet: 1 tab(s) orally once a day  NIFEdipine 30 mg oral tablet, extended release: 1 tab(s) orally once a day  tamsulosin 0.4 mg oral capsule: 1 cap(s) orally once a day   ascorbic acid 500 mg oral tablet: 1 tab(s) orally once a day  bictegravir/emtricitabine/tenofovir 50 mg-200 mg-25 mg oral tablet: 1 tab(s) orally once a day  cilostazol 100 mg oral tablet: 1 tab(s) orally 2 times a day  clopidogrel 75 mg oral tablet: 1 tab(s) orally once a day  folic acid 1 mg oral tablet: 1 tab(s) orally once a day  Genvoya oral tablet: 1 tab(s) orally once a day  metoprolol tartrate 25 mg oral tablet: 1 tab(s) orally 2 times a day  Multiple Vitamins oral tablet: 1 tab(s) orally once a day  NIFEdipine 30 mg oral tablet, extended release: 1 tab(s) orally once a day  pantoprazole 40 mg oral delayed release tablet: 1 tab(s) orally once a day (before a meal)  rosuvastatin 20 mg oral tablet: 1 tab(s) orally once a day (at bedtime)  tamsulosin 0.4 mg oral capsule: 1 cap(s) orally once a day  vancomycin 1 g intravenous injection: 1 gram(s) intravenous every 24 hours Stop after 10/27/24   ascorbic acid 500 mg oral tablet: 1 tab(s) orally once a day  cilostazol 100 mg oral tablet: 1 tab(s) orally 2 times a day  clopidogrel 75 mg oral tablet: 1 tab(s) orally once a day  folic acid 1 mg oral tablet: 1 tab(s) orally once a day  furosemide 40 mg oral tablet: 1 tab(s) orally once a day  Genvoya oral tablet: 1 tab(s) orally once a day  metoprolol tartrate 25 mg oral tablet: 1 tab(s) orally 2 times a day  Multiple Vitamins oral tablet: 1 tab(s) orally once a day  NIFEdipine 30 mg oral tablet, extended release: 1 tab(s) orally once a day  pantoprazole 40 mg oral delayed release tablet: 1 tab(s) orally once a day (before a meal)  rosuvastatin 20 mg oral tablet: 1 tab(s) orally once a day (at bedtime)  tamsulosin 0.4 mg oral capsule: 1 cap(s) orally once a day  vancomycin 1 g intravenous injection: 1 gram(s) intravenous every 24 hours Stop after 10/27/24

## 2024-10-15 NOTE — DISCHARGE NOTE PROVIDER - ATTENDING DISCHARGE PHYSICAL EXAMINATION:
GENERAL: NAD,   HEAD:  Atraumatic, Normocephalic  EYES: conjunctiva and sclera clear  NECK: Supple, No JVD  CHEST/LUNG: Clear to auscultation bilaterally; No wheeze  HEART: Regular rate and rhythm; No murmurs, rubs, or gallops  ABDOMEN: Soft, Nontender, Nondistended; Bowel sounds present  EXTREMITIES:  bilateral LE weakness in setting of deconditioning, No clubbing, cyanosis, or edema  PSYCH: AAOx3  NEUROLOGY: non-focal  SKIN: No rashes or lesions

## 2024-10-15 NOTE — PHYSICAL THERAPY INITIAL EVALUATION ADULT - DIAGNOSIS, PT EVAL
(ICF Model) Pt. present w/deficits in Body Structures/Function (Impairments), incl: Strength, Balance, LE swelling, leading to deficits in performing the below noted Activities (Limitations).

## 2024-10-15 NOTE — PROGRESS NOTE ADULT - PROBLEM SELECTOR PLAN 3
see plan as above secondary to pneumonia, Aerococcus viridans, Aerococcus urinae bacteremia, multiple wounds  -UA, MRSA PCR negative  -CXR with bilateral infiltrates   continue ceftriaxone and vancomycin  repeat blood culture negative  f/u TTE  f/u Vanco trough in 10/16 11:00pm (23:00)  f/u doppler, CRISTIAN  f/u right foot x-ray  f/u speech and swallow evaluation  Vascular consult on hold- pending cristian results  Podiatry consulted  ID Dr Andres secondary to pneumonia, Aerococcus viridans, Aerococcus urinae bacteremia, multiple wounds  -UA, MRSA PCR negative  -CXR with bilateral infiltrates   continue ceftriaxone and vancomycin  repeat blood culture negative  f/u TTE  f/u Vanco trough in 10/16 11:00pm (23:00)  f/u doppler, CRISTIAN  f/u right foot x-ray  f/u speech and swallow evaluation  Vascular consulted  Podiatry consulted  ID Dr Andres

## 2024-10-15 NOTE — PROGRESS NOTE ADULT - NS ATTEST RISK PROBLEM GEN_ALL_CORE FT
Altered mental status, dehydration, rhabdomyolysis, requiring IV fluids, sepsis, requiring IV antibiotics. HIV, deconditioning
Altered mental status, dehydration, rhabdomyolysis, requiring IV fluids, sepsis, requiring IV antibiotics. HIV, arrhythmia
Altered mental status, dehydration, rhabdomyolysis, requiring IV fluids, sepsis, requiring IV antibiotics. HIV, arrhythmia

## 2024-10-15 NOTE — PROGRESS NOTE ADULT - SUBJECTIVE AND OBJECTIVE BOX
NP Note discussed with  Primary Attending    Patient is a 84y old  Male who presents with a chief complaint of sepsis (14 Oct 2024 17:19)      INTERVAL HPI/OVERNIGHT EVENTS: no new complaints    MEDICATIONS  (STANDING):  albuterol/ipratropium for Nebulization 3 milliLiter(s) Nebulizer every 6 hours  ascorbic acid 500 milliGRAM(s) Oral daily  cefTRIAXone   IVPB 2000 milliGRAM(s) IV Intermittent every 24 hours  cilostazol 100 milliGRAM(s) Oral two times a day  clopidogrel Tablet 75 milliGRAM(s) Oral daily  digoxin  Injectable 500 MICROGram(s) IV Push once  heparin   Injectable 5000 Unit(s) SubCutaneous every 12 hours  influenza  Vaccine (HIGH DOSE) 0.5 milliLiter(s) IntraMuscular once  multivitamin 1 Tablet(s) Oral daily  rosuvastatin 20 milliGRAM(s) Oral at bedtime  sodium chloride 3%  Inhalation 4 milliLiter(s) Inhalation every 12 hours  tamsulosin 0.4 milliGRAM(s) Oral at bedtime  vancomycin  IVPB      vancomycin  IVPB 1000 milliGRAM(s) IV Intermittent every 12 hours    MEDICATIONS  (PRN):  aluminum hydroxide/magnesium hydroxide/simethicone Suspension 30 milliLiter(s) Oral every 4 hours PRN Dyspepsia  benzonatate 100 milliGRAM(s) Oral every 8 hours PRN Cough  guaiFENesin  milliGRAM(s) Oral every 12 hours PRN Cough  melatonin 3 milliGRAM(s) Oral at bedtime PRN Insomnia  ondansetron Injectable 4 milliGRAM(s) IV Push every 8 hours PRN Nausea and/or Vomiting      __________________________________________________  REVIEW OF SYSTEMS:    CONSTITUTIONAL: No fever,   EYES: no acute visual disturbances  NECK: No pain or stiffness  RESPIRATORY: No cough; No shortness of breath  CARDIOVASCULAR: No chest pain, no palpitations  GASTROINTESTINAL: No pain. No nausea or vomiting; No diarrhea   NEUROLOGICAL: No headache or numbness, no tremors  MUSCULOSKELETAL: No joint pain, no muscle pain  GENITOURINARY: no dysuria, no frequency, no hesitancy  PSYCHIATRY: no depression , no anxiety  ALL OTHER  ROS negative        Vital Signs Last 24 Hrs  T(C): 36.4 (15 Oct 2024 11:26), Max: 36.9 (14 Oct 2024 15:00)  T(F): 97.5 (15 Oct 2024 11:26), Max: 98.4 (14 Oct 2024 15:00)  HR: 147 (15 Oct 2024 11:26) (69 - 147)  BP: 109/68 (15 Oct 2024 11:26) (81/38 - 109/68)  BP(mean): 71 (14 Oct 2024 19:00) (52 - 71)  RR: 18 (15 Oct 2024 11:26) (18 - 20)  SpO2: 95% (15 Oct 2024 11:26) (92% - 100%)    Parameters below as of 15 Oct 2024 11:26  Patient On (Oxygen Delivery Method): nasal cannula  O2 Flow (L/min): 3      ________________________________________________  PHYSICAL EXAM:  GENERAL: NAD  HEENT: Normocephalic;  conjunctivae and sclerae clear; moist mucous membranes;   NECK : supple  CHEST/LUNG: bilateral expiratory coarse breath sounds, no wheezing  HEART: S1 S2  regular; no murmurs, gallops or rubs  ABDOMEN: Soft, Nontender, Nondistended; Bowel sounds present  EXTREMITIES: bilateral upper and left lower extremities pitting edema +3, Right lower leg pitting edema +1 with mild redness  SKIN: Right lateral, posterior leg ulcer, Left 2nd and 3rd toe tip blisters, Right Ischium DTI, Left Ischium Unstageable, Scrotum IAD  NERVOUS SYSTEM:  Awake and alert; Oriented  to place, person and time ; at times memory waxes and wanes    _________________________________________________  LABS:                        11.3   7.67  )-----------( 55       ( 15 Oct 2024 07:10 )             34.0     10-15    146[H]  |  117[H]  |  18  ----------------------------<  113[H]  3.7   |  25  |  0.88    Ca    7.7[L]      15 Oct 2024 07:10  Phos  2.2     10-15  Mg     2.1     10-15    TPro  4.6[L]  /  Alb  1.8[L]  /  TBili  1.1  /  DBili  x   /  AST  59[H]  /  ALT  36  /  AlkPhos  63  10-14      Urinalysis Basic - ( 15 Oct 2024 07:10 )    Color: x / Appearance: x / SG: x / pH: x  Gluc: 113 mg/dL / Ketone: x  / Bili: x / Urobili: x   Blood: x / Protein: x / Nitrite: x   Leuk Esterase: x / RBC: x / WBC x   Sq Epi: x / Non Sq Epi: x / Bacteria: x    CULTURE RESULTS:                10-14-24 @ 06:14  Specimen Source: --  Method Type: --  Gram Stain - RRL: --  Gram Stain - Wound: --  Bacteria: --  Culture Results:   Commensal javy consistent with body site      Specimen Source:   Method Type: Method Type: PCR (10-12-24 @ 15:15)  Method Type: PCR (10-12-24 @ 15:15)    Gram Stain:   Culture Results: Culture Results:   Commensal javy consistent with body site (10-14-24 @ 06:14)  Culture Results:   No growth at 24 hours (10-13-24 @ 13:45)  Culture Results:   No growth at 24 hours (10-13-24 @ 13:30)  Culture Results:   Growth in aerobic bottle: Staphylococcus epidermidis  "Susceptibilities not performed"  Growth in aerobic and anaerobic bottles: Aerococcus viridans  Growth in anaerobic bottle: Aerococcus urinae  Isolates of Aerococcus spp. are predictably susceptible to penicillin,  ampicillin, and vancomycin. All isolates are resistant to sulfonamides. (10-12-24 @ 15:20)  Culture Results:   Growth in aerobic and anaerobic bottles: Aerococcus viridans  Growth in anaerobic bottle: Aerococcus urinae  Isolates of Aerococcus spp. are predictably susceptible to penicillin,  ampicillin, and vancomycin. All isolates are resistant to sulfonamides.  Direct identification is available within approximately 3-5  hours either by Blood Panel Multiplexed PCR or Direct  MALDI-TOF. Details: [email or web address] (10-12-24 @ 15:15)    Bacteria: Bacteria: Too Numerous to count /HPF (10-12-24 @ 16:00)        CAPILLARY BLOOD GLUCOSE      RADIOLOGY & ADDITIONAL TESTS:  < from: Xray Chest 1 View AP/PA (10.14.24 @ 09:00) >  ACC: [id number] EXAM:  XR CHEST AP OR PA 1V   ORDERED BY: SUJIT PARISH     PROCEDURE DATE:  10/14/2024          INTERPRETATION:  AP erect chest on October 14, 2024 at 8:32 AM. Patient   has rhonchi.    Heart difficult to assess.    There is a moderate left base effusion. There is a loculated effusion and   minor fissure.    These basilar findings have increased from October 12.    IMPRESSION: Increasing bibasilar fluid accumulations.    --- End of Report ---        < end of copied text >  < from: CT Head No Cont (10.12.24 @ 18:44) >  ACC: [id number] EXAM:  CT BRAIN   ORDERED BY: ESPERANZA LANDRUM     PROCEDURE DATE:  10/12/2024          INTERPRETATION:  CLINICAL INFORMATION: fakk unwitnessed    COMPARISON: None.    CONTRAST:  IV Contrast: NONE  Complications: None reportedat time of study completion    Technique: CT head was performed utilizing axial images from the base of   the skull through the vertex without the administration of intravenous   contrast. Images were reviewed in the bone, brain and subdural windows.    Findings:    There is no evidence of acute intracranial hemorrhage or acute   transcortical infarct.  The ventricles are normal in size and caliber.   There are patchy areas of low density within the periventricular white   matter consistent with mild-to-moderate microvascular disease. There is   enlargement of the sulci, cisterns and ventricles consistent with mild   cerebral and cerebellar volume loss. There is no evidence of   hydrocephalus.  There is no evidence of mass-effect or midline shift. There is no   evidence of an intra or extra-axial fluid collection.    Visualized paranasal sinuses and bilateral mastoid air cells are clear.   There is scalp and subcutaneous soft tissue swelling and edema at the   vertex and involving the posterior right parietal occipital and left   temporal parietal and occipital scalp. No acute calvarial fractures are   identified.    Impression: Normal CT of the brain.        --- End of Report ---    < end of copied text >    Imaging  Reviewed:  YES    Consultant(s) Notes Reviewed:   YES      Plan of care was discussed with patient and /or primary care giver; all questions and concerns were addressed

## 2024-10-15 NOTE — PHYSICAL THERAPY INITIAL EVALUATION ADULT - PERTINENT HX OF CURRENT PROBLEM, REHAB EVAL
Pt admitted 10/12/24 for sepsis after being found on floor by daughter in home after 3 days. Pt cannot recall incident. CXR shows increasing bibasilar fluid accumulation. CT head shows no acute infract or intercranial hemorrhage. Pt is now stating he feels weak and has not been able to ambulate since being admitted.

## 2024-10-15 NOTE — PHYSICAL THERAPY INITIAL EVALUATION ADULT - LEVEL OF INDEPENDENCE: SIT/STAND, REHAB EVAL
Pt attempted bed to chair transfer with max A + RW but was unable to lift hips off of bed. Will attempt again in future sessions/unable to perform

## 2024-10-15 NOTE — DIETITIAN INITIAL EVALUATION ADULT - NS FNS DIET ORDER
Diet, DASH/TLC:   Sodium & Cholesterol Restricted  Pureed (PUREED)  Supplement Feeding Modality:  Oral  Ensure Plus High Protein Cans or Servings Per Day:  1       Frequency:  Daily (10-15-24 @ 12:18) [Pending Verification By Attending]  Diet, Soft and Bite Sized:   DASH/TLC {Sodium & Cholesterol Restricted} (10-13-24 @ 11:11) [Active]

## 2024-10-15 NOTE — PROGRESS NOTE ADULT - PROBLEM SELECTOR PLAN 11
f/u TTE  f/u Vanco trough in 10/16 11:00pm (23:00)  f/u doppler, CRISTIAN  f/u right foot x-ray  f/u speech and swallow evaluation  pending PT- daughter agrees to send patient to [facility]

## 2024-10-15 NOTE — DIETITIAN INITIAL EVALUATION ADULT - NSFNSPHYEXAMSKINFT_GEN_A_CORE
Pressure Injury 1: coccyx, Suspected deep tissue injury  Pressure Injury 2: Right:, posterior, leg, Stage II  Pressure Injury 3: Bilateral:, heel, Suspected deep tissue injury  Pressure Injury 4: Right:, lateral, leg, Unstageable  Pressure Injury 5: scrotum, Unstageable

## 2024-10-15 NOTE — CONSULT NOTE ADULT - ATTENDING COMMENTS
I, Dr. Lazaro Lerner, personally performed the services described in his document. all medical entries made by the resident were at my direction and in my presence. I reviewed the chart, agree that the record reflects my personal performance in his accurate and complete. greater than 30 minutes were spent on time with the patient, reviewing the medical records, overseeing documentation in the chart

## 2024-10-15 NOTE — CONSULT NOTE ADULT - ASSESSMENT
85 y/o male admitted s/p fall with sepsis, aspiration pneumonia, and rhabdo, vascular consulted for nonpalpable pulses dp/pt b/l. Pt endorses history of b/l stents placed in lower extremities by Dr. Muhammad in [facility]? 3-4 years ago as per patient, poor historian  83 y/o male admitted s/p fall with sepsis, aspiration pneumonia, and rhabdo, vascular consulted for nonpalpable pulses dp/pt b/l. Pt endorses history of b/l stents placed in lower extremities by Dr. Muhammad in [facility]? 3-4 years ago as per patient, poor historian. Right lateral leg wound, left toe wounds, no signs of acute infection, compartments soft throughout   -f/u venous duplex   -local wound care per podiatry   -care per medical team   -no acute vascular surgical intervention warranted at this time, will f/u pending studies   -discussed with Dr. Russell who agrees

## 2024-10-15 NOTE — DIETITIAN INITIAL EVALUATION ADULT - ORAL INTAKE PTA/DIET HISTORY
Patient said he gained weight and his daughter said that he lost a little weight but most likely is due to fluid shifts. Patient said that his legs were very swollen and are better now. He said that is weight is usually in the 190's and his height is 5'11". He said that he loves scrambled eggs and that he is not able to chew hard food since he has no teeth. Discussed diet change with patient and his daughter, endorsed to NP.

## 2024-10-15 NOTE — CONSULT NOTE ADULT - SUBJECTIVE AND OBJECTIVE BOX
GENERAL SURGERY CONSULT NOTE    Patient is a 84y old  Male who presents with a chief complaint of sepsis (15 Oct 2024 17:08)      HPI:  84-year-old man from home with hypertension, PVD, BPH, HIV (on Genvoya??) coming with his daughter after she found him on the floor.  She had been trying to get in touch with him for 3 days but was unable so came up to [place] from [place], where she had recently moved, and found him on the floor covered in feces and urine.  He was confused at the time.  He does not recall details about how he got to the floor.  Per daughter, he is independent and mentate as well at baseline. On exam, he appears to be A&O x 3 but he is very difficult to understand.  He appears very dry and sounds like he is swallowing his tongue.  His daughter has difficulty understanding him as well.    Exam is notable for very dry appearance, garbled speech, rhonchi on all anterior lung fields, saturating 88% on room air and requiring 3 L nasal cannula, bilateral lower extremity pitting edema with old and new wounds on the legs, and an abrasion on the forehead.     ED Course    Vitals: HRmax 115, O2 88% on RA, requiring 3L NC  Labs: WBC 13, plt 82, trops 550>538, lactate 2.3>2.9, BUN/Cr 39/1.26, gluc 64, , UA with RBC, granular casts, protein  Intervention: s/p zosyn, 2.5L IVF  Consults:   Images: CTH wnl; CXR with bl LL consolidations on wet read        (12 Oct 2024 21:33)      PAST MEDICAL & SURGICAL HISTORY:  HIV (human immunodeficiency virus infection)      Umbilical hernia      Essential hypertension      Cardiomegaly      Elevated PSA      Urge incontinence      PVD (peripheral vascular disease)      Shingles      Enlarged prostate      Unilateral inguinal hernia, without obstruction or gangrene, not specified as recurrent      S/P hernia repair  right inguinal - 2013      History of cystoscopy  with photovaporization, green light laser lithotripsy 2016      PVD (peripheral vascular disease)  bilateral leg stents 2020      S/P cataract surgery          Review of Systems:    I have reviewed 9 systems with the patient and the only positive findings were     MEDICATIONS  (STANDING):  albuterol/ipratropium for Nebulization 3 milliLiter(s) Nebulizer every 6 hours  ascorbic acid 500 milliGRAM(s) Oral daily  cefTRIAXone   IVPB 2000 milliGRAM(s) IV Intermittent every 24 hours  folic acid 1 milliGRAM(s) Oral <User Schedule>  heparin   Injectable 5000 Unit(s) SubCutaneous every 12 hours  influenza  Vaccine (HIGH DOSE) 0.5 milliLiter(s) IntraMuscular once  metoprolol tartrate 25 milliGRAM(s) Oral two times a day  multivitamin 1 Tablet(s) Oral daily  pantoprazole    Tablet 40 milliGRAM(s) Oral before breakfast  rosuvastatin 20 milliGRAM(s) Oral at bedtime  sodium chloride 3%  Inhalation 4 milliLiter(s) Inhalation every 12 hours  tamsulosin 0.4 milliGRAM(s) Oral at bedtime  vancomycin  IVPB      vancomycin  IVPB 1000 milliGRAM(s) IV Intermittent every 12 hours    MEDICATIONS  (PRN):  aluminum hydroxide/magnesium hydroxide/simethicone Suspension 30 milliLiter(s) Oral every 4 hours PRN Dyspepsia  benzonatate 100 milliGRAM(s) Oral every 8 hours PRN Cough  guaiFENesin  milliGRAM(s) Oral every 12 hours PRN Cough  melatonin 3 milliGRAM(s) Oral at bedtime PRN Insomnia  ondansetron Injectable 4 milliGRAM(s) IV Push every 8 hours PRN Nausea and/or Vomiting      Allergies    No Known Allergies    Intolerances        SOCIAL HISTORY          Smoking: Yes [ ]  No [ ]   ______pk yrs          ETOH  Yes [ ]  No [ ]  Social [ ]          DRUGS:  Yes [ ]  No [ ]  if so what______________    FAMILY HISTORY:  No pertinent family history in first degree relatives        Vital Signs Last 24 Hrs  T(C): 36.5 (15 Oct 2024 15:39), Max: 36.9 (14 Oct 2024 19:00)  T(F): 97.7 (15 Oct 2024 15:39), Max: 98.4 (14 Oct 2024 19:00)  HR: 97 (15 Oct 2024 15:39) (69 - 147)  BP: 132/76 (15 Oct 2024 15:39) (86/51 - 132/76)  BP(mean): 71 (14 Oct 2024 19:00) (71 - 71)  RR: 18 (15 Oct 2024 15:39) (18 - 20)  SpO2: 98% (15 Oct 2024 15:39) (93% - 100%)    Parameters below as of 15 Oct 2024 15:39  Patient On (Oxygen Delivery Method): nasal cannula  O2 Flow (L/min): 2      Physical Exam:    General:  Appears stated age, well-groomed, no distress  Eyes : EOMI   HENT:  WNL, no JVD  Respirations: Unlabored   Abdomen: Soft, nondistended, nontender   Extremities: lle edema>rle, nonpalpable dp/pt bilaterally, fem palpable b/l. right lateral leg wound with fibrotic wound base. Small blisters dorsal to the PIPJ of L 2nd and 3rd digits. No purulence. No soft tissue crepitance. No fluctuance. No clinical signs of infection or ascending infection, compartments soft bilaterally   Skin:  Warm and dry   Musculoskeletal:  No calf tenderness b/l   Neuro: Alert, oriented to time, place and person   Psych:  Normal affect       LABS:                        11.3   7.67  )-----------( 55       ( 15 Oct 2024 07:10 )             34.0     10-15    146[H]  |  117[H]  |  18  ----------------------------<  113[H]  3.7   |  25  |  0.88    Ca    7.7[L]      15 Oct 2024 07:10  Phos  2.2     10-15  Mg     2.1     10-15    TPro  4.6[L]  /  Alb  1.8[L]  /  TBili  1.1  /  DBili  x   /  AST  59[H]  /  ALT  36  /  AlkPhos  63  10-14      Urinalysis Basic - ( 15 Oct 2024 07:10 )    Color: x / Appearance: x / SG: x / pH: x  Gluc: 113 mg/dL / Ketone: x  / Bili: x / Urobili: x   Blood: x / Protein: x / Nitrite: x   Leuk Esterase: x / RBC: x / WBC x   Sq Epi: x / Non Sq Epi: x / Bacteria: x     GENERAL SURGERY CONSULT NOTE    Patient is a 84y old  Male who presents with a chief complaint of sepsis (15 Oct 2024 17:08)      HPI:  84-year-old man from home with hypertension, PVD, BPH, HIV (on Genvoya??) coming with his daughter after she found him on the floor.  She had been trying to get in touch with him for 3 days but was unable so came up to [place] from [place], where she had recently moved, and found him on the floor covered in feces and urine.  He was confused at the time.  He does not recall details about how he got to the floor.  Per daughter, he is independent and mentate as well at baseline. On exam, he appears to be A&O x 3 but he is very difficult to understand.  He appears very dry and sounds like he is swallowing his tongue.  His daughter has difficulty understanding him as well.    Exam is notable for very dry appearance, garbled speech, rhonchi on all anterior lung fields, saturating 88% on room air and requiring 3 L nasal cannula, bilateral lower extremity pitting edema with old and new wounds on the legs, and an abrasion on the forehead.     ED Course    Vitals: HRmax 115, O2 88% on RA, requiring 3L NC  Labs: WBC 13, plt 82, trops 550>538, lactate 2.3>2.9, BUN/Cr 39/1.26, gluc 64, , UA with RBC, granular casts, protein  Intervention: s/p zosyn, 2.5L IVF  Consults:   Images: CTH wnl; CXR with bl LL consolidations on wet read     83 y/o male admitted s/p fall with sepsis for prolonged period of time, aspiration pneumonia, and rhabdo, vascular consulted for nonpalpable pulses dp/pt b/l. Pt endorses history of b/l stents placed in lower extremities by Dr. Muhammad in [facility]? 3-4 years ago as per patient, poor historian. Right lateral leg wound, left toe wounds, no signs of acute infection, compartments soft throughout. Pt is pending venous duplex and bashir/pvrs. Chart reviewed with on call vascular surgeon Dr. Russell. Seen and examined, daughter at bedside, endorses ambulating without assistance at baseline, denies smoking history. Unsure of last follow up with vascular doctor or any specifics about prior procedures that were done.     PAST MEDICAL & SURGICAL HISTORY:  HIV (human immunodeficiency virus infection)      Umbilical hernia      Essential hypertension      Cardiomegaly      Elevated PSA      Urge incontinence      PVD (peripheral vascular disease)      Shingles      Enlarged prostate      Unilateral inguinal hernia, without obstruction or gangrene, not specified as recurrent      S/P hernia repair  right inguinal - 2013      History of cystoscopy  with photovaporization, green light laser lithotripsy 2016      PVD (peripheral vascular disease)  bilateral leg stents 2020      S/P cataract surgery          Review of Systems:    I have reviewed 9 systems with the patient and the only positive findings were     MEDICATIONS  (STANDING):  albuterol/ipratropium for Nebulization 3 milliLiter(s) Nebulizer every 6 hours  ascorbic acid 500 milliGRAM(s) Oral daily  cefTRIAXone   IVPB 2000 milliGRAM(s) IV Intermittent every 24 hours  folic acid 1 milliGRAM(s) Oral <User Schedule>  heparin   Injectable 5000 Unit(s) SubCutaneous every 12 hours  influenza  Vaccine (HIGH DOSE) 0.5 milliLiter(s) IntraMuscular once  metoprolol tartrate 25 milliGRAM(s) Oral two times a day  multivitamin 1 Tablet(s) Oral daily  pantoprazole    Tablet 40 milliGRAM(s) Oral before breakfast  rosuvastatin 20 milliGRAM(s) Oral at bedtime  sodium chloride 3%  Inhalation 4 milliLiter(s) Inhalation every 12 hours  tamsulosin 0.4 milliGRAM(s) Oral at bedtime  vancomycin  IVPB      vancomycin  IVPB 1000 milliGRAM(s) IV Intermittent every 12 hours    MEDICATIONS  (PRN):  aluminum hydroxide/magnesium hydroxide/simethicone Suspension 30 milliLiter(s) Oral every 4 hours PRN Dyspepsia  benzonatate 100 milliGRAM(s) Oral every 8 hours PRN Cough  guaiFENesin  milliGRAM(s) Oral every 12 hours PRN Cough  melatonin 3 milliGRAM(s) Oral at bedtime PRN Insomnia  ondansetron Injectable 4 milliGRAM(s) IV Push every 8 hours PRN Nausea and/or Vomiting      Allergies    No Known Allergies    Intolerances        SOCIAL HISTORY          Smoking: Yes [ ]  No [ ]   ______pk yrs          ETOH  Yes [ ]  No [ ]  Social [ ]          DRUGS:  Yes [ ]  No [ ]  if so what______________    FAMILY HISTORY:  No pertinent family history in first degree relatives        Vital Signs Last 24 Hrs  T(C): 36.5 (15 Oct 2024 15:39), Max: 36.9 (14 Oct 2024 19:00)  T(F): 97.7 (15 Oct 2024 15:39), Max: 98.4 (14 Oct 2024 19:00)  HR: 97 (15 Oct 2024 15:39) (69 - 147)  BP: 132/76 (15 Oct 2024 15:39) (86/51 - 132/76)  BP(mean): 71 (14 Oct 2024 19:00) (71 - 71)  RR: 18 (15 Oct 2024 15:39) (18 - 20)  SpO2: 98% (15 Oct 2024 15:39) (93% - 100%)    Parameters below as of 15 Oct 2024 15:39  Patient On (Oxygen Delivery Method): nasal cannula  O2 Flow (L/min): 2      Physical Exam:    General:  Appears stated age, well-groomed, no distress  Eyes : EOMI   HENT:  WNL, no JVD  Respirations: Unlabored   Abdomen: Soft, nondistended, nontender   Extremities: lle edema>rle, nonpalpable dp/pt bilaterally, fem palpable b/l. right lateral leg wound with fibrotic wound base. Small blisters dorsal to the PIPJ of L 2nd and 3rd digits. No purulence. No soft tissue crepitance. No fluctuance. No clinical signs of infection or ascending infection, compartments soft bilaterally, sensation in tact b/l  Skin:  Warm and dry   Musculoskeletal:  No calf tenderness b/l   Neuro: Alert, oriented to time, place and person   Psych:  Normal affect       LABS:                        11.3   7.67  )-----------( 55       ( 15 Oct 2024 07:10 )             34.0     10-15    146[H]  |  117[H]  |  18  ----------------------------<  113[H]  3.7   |  25  |  0.88    Ca    7.7[L]      15 Oct 2024 07:10  Phos  2.2     10-15  Mg     2.1     10-15    TPro  4.6[L]  /  Alb  1.8[L]  /  TBili  1.1  /  DBili  x   /  AST  59[H]  /  ALT  36  /  AlkPhos  63  10-14      Urinalysis Basic - ( 15 Oct 2024 07:10 )    Color: x / Appearance: x / SG: x / pH: x  Gluc: 113 mg/dL / Ketone: x  / Bili: x / Urobili: x   Blood: x / Protein: x / Nitrite: x   Leuk Esterase: x / RBC: x / WBC x   Sq Epi: x / Non Sq Epi: x / Bacteria: x

## 2024-10-15 NOTE — PHYSICAL THERAPY INITIAL EVALUATION ADULT - ADDITIONAL COMMENTS
Pt lives in a private apt with elevator access and no JAIDA. Prior to admission pt was fully independent with ADLs and ambulation. Pt denies using any AD for transfers or ambulation.

## 2024-10-15 NOTE — PHYSICAL THERAPY INITIAL EVALUATION ADULT - RANGE OF MOTION EXAMINATION, REHAB EVAL
B shoulders limited to ~1/2 of available flexion range actively. B LE limted to ~4/5 of available range actively in all motions./deficits as listed below

## 2024-10-15 NOTE — CONSULT NOTE ADULT - NS ATTEND AMEND GEN_ALL_CORE FT
Patient brought in by EMS. Found down. Reportedly family could not reach him for three days. Unclear how long patient was down. Vascular surgery consulted for history of BLE PAD with superficial dorsal left toe wounds. MRI brain negative for acute pathology. CRISTIAN/PVR ordered. On exam, patient able to answer questions appropriately. Sensorimotor function intact. Superficial ulcers of posterior/lateral calves and superficial ulceration of toes. Femoral pulses and pedal signals present.   -Evaluation for other injuries, etiology of presumed fall per primary team  -Local wound care to superficial wounds of lower extremities

## 2024-10-15 NOTE — DIETITIAN INITIAL EVALUATION ADULT - PERTINENT LABORATORY DATA
10-15    146[H]  |  117[H]  |  18  ----------------------------<  113[H]  3.7   |  25  |  0.88    Ca    7.7[L]      15 Oct 2024 07:10  Phos  2.2     10-15  Mg     2.1     10-15    TPro  4.6[L]  /  Alb  1.8[L]  /  TBili  1.1  /  DBili  x   /  AST  59[H]  /  ALT  36  /  AlkPhos  63  10-14

## 2024-10-15 NOTE — PROGRESS NOTE ADULT - ATTENDING COMMENTS
Patient seen and examined this afternoon with Daughter visiting from [place] at bedside    85yo m with  PMH of HTN, HD, PAD s/p bilateral LE stents, BPH, HIV, p/w ams following presumed unwitnessed fall amd prolonged unsuspecified  time on floor for up to 3 days possibly.; in er, found to be meeting severe sirs/sepsis criteria + in AHRF requiring 3 lpm via nc; admitted with suspected aspiration iso fall, dehydration    Vital Signs Last 24 Hrs  T(C): 36.4 (15 Oct 2024 11:26), Max: 36.9 (14 Oct 2024 15:00)  T(F): 97.5 (15 Oct 2024 11:26), Max: 98.4 (14 Oct 2024 15:00)  HR: 147 (15 Oct 2024 11:26) (69 - 147)  BP: 109/68 (15 Oct 2024 11:26) (81/38 - 109/68)  BP(mean): 71 (14 Oct 2024 19:00) (52 - 71)  RR: 18 (15 Oct 2024 11:26) (18 - 20)  SpO2: 95% (15 Oct 2024 11:26) (92% - 100%) nasal cannula  O2 Flow (L/min): 3    P/E:  elderly male, appear chronically ill and somewhat disheveled,   Neuro: AAO x 2.5 today; improved mentation as per daughter at bedside  Chest : B/L Coarse breath sounds  CVS: S1S2 present  Abdomen, soft, BS+, NT; mildly distended  Ext: significant edema of RUE and RLE with skin breakdown, right (may be portal of entry for bacteremia)    Labs:                      11.3   7.67  )-----------( 55       ( 15 Oct 2024 07:10 )             34.0   10-15    145  |  117[H]  |  18  ----------------------------<  106[H]  4.3   |  23  |  0.92  Ca    7.8[L]      15 Oct 2024 22:45  Phos  2.3     10-15  Mg     2.1     10-15  TPro  4.6[L]  /  Alb  1.8[L]  /  TBili  1.1  /  DBili  x   /  AST  59[H]  /  ALT  36  /  AlkPhos  63  10-14    Folate, Serum (10.14.24 @ 06:40) Folate, Serum: <2.0: Test Repeated ng/mL  Thyroid Stimulating Hormone, Serum: 2.25 uU/mL (10.14.24 @ 06:40)     Lactate, Blood (10.14.24 @ 06:40) Lactate, Blood: 1.5 mmol/L  Lactate, Blood (10.12.24 @ 19:00) Lactate, Blood: 2.9:    Culture - Blood (10.12.24 @ 15:15)   Culture Results:   Growth in aerobic and anaerobic bottles: Aerococcus viridans   Growth in anaerobic bottle: Aerococcus urinae     Xray Chest 1 View AP/PA (10.14.24 @ 09:00)   IMPRESSION: Increasing bibasilar fluid accumulations.    CT Head No Cont (10.12.24 @ 18:44)   Impression: Normal CT of the brain.    A/P:  Sepsis with Bacteremia due to Aerococcus viridans etiology ?LE Ulcers   concern for Endocarditis clinically doubt  A. Fib with RVR ppt by severe infection  Acute Hypoxic Resp. failure wih Suspected Aspiration Pneumonia  Right facial droop and Right hemiparesis  Severe folate deficiency  B/L LE edema etiology ?? Venous insufficiency vs   Lactic acidosis possibly starvation ketoacidosis  Hypernatremia secondary to dehydration  rhabdomyolysis  Hx PAD with B/L stents  E;levated troponin likely demand ischemia    Plan:  Continue IV Vanco and Ceftriaxone for now  ID consult; d/w Dr. Andres recommend HEATHER  Spoke with Cardio Dr. Rodriguez; will keep NPO after Midnight and try to get HEATHER tomorrow if schedule permits   Cardiology consultation, Dr. Daniel appreciated; Digoxin 0.5 mg IV push given this afternoon with better controlled HR this evning  BP beter after R controlled; Add low dsoe Metoprolol 25 mg q 12 hrs  Right hemiparesis, r/o CNS emboli vs CNS mass; will get Neuro eval; will benefit from an MRI with and without contrast in my opnion given Hx HIV  Repeat Blood Cx testing;   Follow up TTE/ HEATHER  Nutrition consultation; dd Ensure BID  Follow up HIV VL and CD4, resume ARV, when able to swallow  PT/ OT eval  Social work/ Case mgmt consult for disposition once medically stable; critically ill at this time  .  Discussed with Daughter at bedside and reviewed findings and plan of care as above  Discussed with SMILEY Lowery

## 2024-10-15 NOTE — PHYSICAL THERAPY INITIAL EVALUATION ADULT - LEVEL OF INDEPENDENCE: BED TO CHAIR, REHAB EVAL
Pt attempted bed to chair transfer with max A + RW but was unable to lift hips off of bed. Will attempt again in future sessions./unable to perform

## 2024-10-15 NOTE — DISCHARGE NOTE PROVIDER - HOSPITAL COURSE
84-year-old man from home with hypertension, PVD, BPH, HIV. Patient baseline A+Ox3. Patient present to ED lethargic after being found by daughter on floor covered in feces and urine. Patient admitted telemetry for sepsis, afib with  RVR Acute hypoxic respiratory failure secondary to pneumonia. CT head no acute hemorrhage or infarct, with microvascular changes and volume loss. Chest x-ray with moderate left loculated effusion. Initial blood culture resulting Staphylococcus epidermidis-likely contaminate, Aerococcus viridans, Aerococcus urinae, Isolates of Aerococcus spp. are predictably susceptible to penicillin, ampicillin, and vancomycin. All isolates are resistant to sulfonamides. Repeat blood culture negative. ID consulted. Patient treated with Rocephin and Vancomycin. Patient with multiple wounds, Wound care consulted. Podiatry consulted.    ·  Problem: Acute hypoxic respiratory failure.   ·  Plan: secondary to pneumonia  continue ceftriaxone and vancomycin  atypical work up negative  completed azithromycin  maintain SPO2 94%< RA  continue Chest PT   starting hypertonic saline nebulizer  continue duoneb  incentive spirometer   continue tessalon pearls and mucinex PRN.    ·  Problem: Afib.   ·  Plan: Afib with RVR- likley from infection  uncontrolled  's  Digoxin 0.5mg IVP x1 given  patient hypotensive, likely from RVR- nifedipine held  elevated troponin likely ischemic demand   continue telemetry  f/u am add on- will trend  restarted metoprolol 25mg BID  Cardiology Dr. Daniel.    ·  Problem: Sepsis.   ·  Plan: secondary to pneumonia, Aerococcus viridans, Aerococcus urinae bacteremia, multiple wounds  -UA, MRSA PCR negative  -CXR with bilateral infiltrates   continue ceftriaxone and vancomycin  repeat blood culture negative  f/u TTE  f/u Vanco trough in 10/16 11:00pm (23:00)  f/u doppler, CRISTIAN  f/u right foot x-ray  f/u speech and swallow evaluation  Vascular consulted  Podiatry consulted  ID Dr Andres.    ********incomplete***********************   84-year-old man from home with hypertension, PVD, BPH, HIV. Patient baseline A+Ox3. Patient present to ED lethargic after being found by daughter on floor covered in feces and urine. Patient admitted telemetry for sepsis, afib with  RVR Acute hypoxic respiratory failure secondary to pneumonia. CT head no acute hemorrhage or infarct, with microvascular changes and volume loss. Chest x-ray with moderate left loculated effusion. Initial blood culture resulting Staphylococcus epidermidis-likely contaminate, Aerococcus viridans, Aerococcus urinae, Isolates of Aerococcus spp. are predictably susceptible to penicillin, ampicillin, and vancomycin. All isolates are resistant to sulfonamides. Repeat blood culture negative. ID consulted. Patient treated with Rocephin and Vancomycin. Patient with multiple wounds, Wound care consulted. Podiatry consulted.  Patient recommends IV Vancomycin until 10/27/24.     Please note that this a brief summary of hospital course please refer to daily progress notes and consult notes for full course and events. Patient seen and examined at bedside, discussed with medical attending. Patient medically cleared for discharge to.

## 2024-10-15 NOTE — PROGRESS NOTE ADULT - SUBJECTIVE AND OBJECTIVE BOX
PATIENT SEEN AND EXAMINED BY ADEN ZHANG M.D. ON :- 10/15/24  DATE OF SERVICE:    10/15/24         Interim events noted,Labs ,Radiological studies and Cardiology tests reviewed .    Patient is a 84y old  Male who presents with a chief complaint of sepsis (15 Oct 2024 19:01)      HPI:  84-year-old man from home with hypertension, PVD, BPH, HIV (on Genvoya??) coming with his daughter after she found him on the floor.  She had been trying to get in touch with him for 3 days but was unable so came up to [place] from [place], where she had recently moved, and found him on the floor covered in feces and urine.  He was confused at the time.  He does not recall details about how he got to the floor.  Per daughter, he is independent and mentate as well at baseline. On exam, he appears to be A&O x 3 but he is very difficult to understand.  He appears very dry and sounds like he is swallowing his tongue.  His daughter has difficulty understanding him as well.    Exam is notable for very dry appearance, garbled speech, rhonchi on all anterior lung fields, saturating 88% on room air and requiring 3 L nasal cannula, bilateral lower extremity pitting edema with old and new wounds on the legs, and an abrasion on the forehead.     ED Course    Vitals: HRmax 115, O2 88% on RA, requiring 3L NC  Labs: WBC 13, plt 82, trops 550>538, lactate 2.3>2.9, BUN/Cr 39/1.26, gluc 64, , UA with RBC, granular casts, protein  Intervention: s/p zosyn, 2.5L IVF  Consults:   Images: CTH wnl; CXR with bl LL consolidations on wet read        (12 Oct 2024 21:33)      PAST MEDICAL & SURGICAL HISTORY:  HIV (human immunodeficiency virus infection)      Umbilical hernia      Essential hypertension      Cardiomegaly      Elevated PSA      Urge incontinence      PVD (peripheral vascular disease)      Shingles      Enlarged prostate      Unilateral inguinal hernia, without obstruction or gangrene, not specified as recurrent      S/P hernia repair  right inguinal - 2013      History of cystoscopy  with photovaporization, green light laser lithotripsy 2016      PVD (peripheral vascular disease)  bilateral leg stents 2020      S/P cataract surgery          PREVIOUS DIAGNOSTIC TESTING:      ECHO  FINDINGS:    STRESS  FINDINGS:    CATHETERIZATION  FINDINGS:    MEDICATIONS  (STANDING):  albuterol/ipratropium for Nebulization 3 milliLiter(s) Nebulizer every 6 hours  ascorbic acid 500 milliGRAM(s) Oral daily  cefTRIAXone   IVPB 2000 milliGRAM(s) IV Intermittent every 24 hours  folic acid 1 milliGRAM(s) Oral <User Schedule>  heparin   Injectable 5000 Unit(s) SubCutaneous every 12 hours  influenza  Vaccine (HIGH DOSE) 0.5 milliLiter(s) IntraMuscular once  metoprolol tartrate 25 milliGRAM(s) Oral two times a day  multivitamin 1 Tablet(s) Oral daily  pantoprazole    Tablet 40 milliGRAM(s) Oral before breakfast  rosuvastatin 20 milliGRAM(s) Oral at bedtime  sodium chloride 3%  Inhalation 4 milliLiter(s) Inhalation every 12 hours  tamsulosin 0.4 milliGRAM(s) Oral at bedtime  vancomycin  IVPB      vancomycin  IVPB 1000 milliGRAM(s) IV Intermittent every 12 hours    MEDICATIONS  (PRN):  aluminum hydroxide/magnesium hydroxide/simethicone Suspension 30 milliLiter(s) Oral every 4 hours PRN Dyspepsia  benzonatate 100 milliGRAM(s) Oral every 8 hours PRN Cough  guaiFENesin  milliGRAM(s) Oral every 12 hours PRN Cough  melatonin 3 milliGRAM(s) Oral at bedtime PRN Insomnia  ondansetron Injectable 4 milliGRAM(s) IV Push every 8 hours PRN Nausea and/or Vomiting      FAMILY HISTORY:  No pertinent family history in first degree relatives        SOCIAL HISTORY:    CIGARETTES:    ALCOHOL:    REVIEW OF SYSTEMS:  CONSTITUTIONAL: No fever, weight loss, or fatigue  EYES: No eye pain, visual disturbances, or discharge  ENMT:  No difficulty hearing, tinnitus, vertigo; No sinus or throat pain  NECK: No pain or stiffness  RESPIRATORY: No cough, wheezing, chills or hemoptysis; No shortness of breath  CARDIOVASCULAR: No chest pain, palpitations, dizziness, or leg swelling  GASTROINTESTINAL: No abdominal or epigastric pain. No nausea, vomiting, or hematemesis; No diarrhea or constipation. No melena or hematochezia.  GENITOURINARY: No dysuria, frequency, hematuria, or incontinence  NEUROLOGICAL: No headaches, memory loss, loss of strength, numbness, or tremors  SKIN: No itching, burning, rashes, or lesions   LYMPH NODES: No enlarged glands  ENDOCRINE: No heat or cold intolerance; No hair loss  MUSCULOSKELETAL: No joint pain or swelling; No muscle, back, or extremity pain  PSYCHIATRIC: No depression, anxiety, mood swings, or difficulty sleeping  HEME/LYMPH: No easy bruising, or bleeding gums  ALLERY AND IMMUNOLOGIC: No hives or eczema    Vital Signs Last 24 Hrs  T(C): 36.7 (15 Oct 2024 19:24), Max: 36.7 (15 Oct 2024 19:24)  T(F): 98.1 (15 Oct 2024 19:24), Max: 98.1 (15 Oct 2024 19:24)  HR: 96 (15 Oct 2024 19:24) (69 - 147)  BP: 102/54 (15 Oct 2024 19:24) (86/51 - 132/76)  BP(mean): --  RR: 18 (15 Oct 2024 19:24) (18 - 20)  SpO2: 97% (15 Oct 2024 19:24) (93% - 98%)    Parameters below as of 15 Oct 2024 19:24  Patient On (Oxygen Delivery Method): nasal cannula  O2 Flow (L/min): 2        PHYSICAL EXAM:  GENERAL: NAD, well-groomed, well-developed  HEAD:  Atraumatic, Normocephalic  EYES: EOMI, PERRLA, conjunctiva and sclera clear  ENMT: No tonsillar erythema, exudates, or enlargement; Moist mucous membranes, Good dentition, No lesions  NECK: Supple, No JVD, Normal thyroid  NERVOUS SYSTEM:  Alert & Oriented X3, Good concentration; Motor Strength 5/5 B/L upper and lower extremities; DTRs 2+ intact and symmetric  CHEST/LUNG: Clear to percussion bilaterally; No rales, rhonchi, wheezing, or rubs  HEART: Regular rate and rhythm; No murmurs, rubs, or gallops  ABDOMEN: Soft, Nontender, Nondistended; Bowel sounds present  EXTREMITIES:  2+ Peripheral Pulses, No clubbing, cyanosis, or edema  LYMPH: No lymphadenopathy noted  SKIN: No rashes or lesions      INTERPRETATION OF TELEMETRY:    ECG:    [facility]:     LABS:                        11.3   7.67  )-----------( 55       ( 15 Oct 2024 07:10 )             34.0     10-15    146[H]  |  117[H]  |  18  ----------------------------<  113[H]  3.7   |  25  |  0.88    Ca    7.7[L]      15 Oct 2024 07:10  Phos  2.2     10-15  Mg     2.1     10-15    TPro  4.6[L]  /  Alb  1.8[L]  /  TBili  1.1  /  DBili  x   /  AST  59[H]  /  ALT  36  /  AlkPhos  63  10-14          Urinalysis Basic - ( 15 Oct 2024 07:10 )    Color: x / Appearance: x / SG: x / pH: x  Gluc: 113 mg/dL / Ketone: x  / Bili: x / Urobili: x   Blood: x / Protein: x / Nitrite: x   Leuk Esterase: x / RBC: x / WBC x   Sq Epi: x / Non Sq Epi: x / Bacteria: x      Lipid Panel:   I&O's Summary    14 Oct 2024 07:01  -  15 Oct 2024 07:00  --------------------------------------------------------  IN: 3495 mL / OUT: 0 mL / NET: 3495 mL        RADIOLOGY & ADDITIONAL STUDIES:

## 2024-10-15 NOTE — DISCHARGE NOTE PROVIDER - NSDCCPCAREPLAN_GEN_ALL_CORE_FT
PRINCIPAL DISCHARGE DIAGNOSIS  Diagnosis: Sepsis, Gram positive  Assessment and Plan of Treatment: You presented with sepsis fue to gram positive bacteremia. You were treated with IV antibitics under the care of an infection disease doctor.   Your repeat blood cultures were negative  Echocardiogram of your heart shows EF 20-25%, with severe left ventricular hypertrophy.  HEATHER was negative for vegetation.  You are recommended to continue your antibiotics Vancomycin until 10/27.      SECONDARY DISCHARGE DIAGNOSES  Diagnosis: Afib  Assessment and Plan of Treatment: You have history of afib, & noted with afib with RVR. You were treated with medications to control your heart rate.  Continue to take your medications as prescribed, & follow up with your doctor.   Atrial fibrillation is the most common heart rhythm problem & has the risk of stroke & heart attack  It helps if you control your blood pressure, not drink more than 1-2 alcohol drinks per day, cut down on caffeine, getting treatment for over active thyroid gland, & getting exercise  Call your doctor if you feel your heart racing or beating unusually, chest tightness or pain, lightheaded, faint, shortness of breath especially with exercise  It is important to take your heart medication as prescribed  You may be on anticoagulation which is very important to take as directed - you may need blood work to monitor drug levels      Diagnosis: Right hemiparesis  Assessment and Plan of Treatment: You were also evaluated by neuroly team for right sided weakness concerning for stroke. All stroke work up was negative. You were treated for secndary stroke prevention measures. You were evaluated by PT who recommended rehab.    Diagnosis: HIV disease  Assessment and Plan of Treatment: Continue to take your HIV medication as prescribed ( Biktarvy)  Follow up with your primary doctor.    Diagnosis: Pneumonia, aspiration  Assessment and Plan of Treatment: You were also broght with acute respiratory distress likely due to aspiration pneumonia. You were treated with antibiotics.     PRINCIPAL DISCHARGE DIAGNOSIS  Diagnosis: Sepsis, Gram positive  Assessment and Plan of Treatment: You presented with sepsis fue to gram positive bacteremia. You were treated with IV antibitics under the care of an infection disease doctor.   Your repeat blood cultures were negative  Echocardiogram of your heart shows EF 20-25%, with severe left ventricular hypertrophy.  HEATHER was negative for vegetation.  You are recommended to continue your antibiotics Vancomycin until 10/27.      SECONDARY DISCHARGE DIAGNOSES  Diagnosis: Pneumonia, aspiration  Assessment and Plan of Treatment: You were also broght with acute respiratory distress likely due to aspiration pneumonia. You were treated with antibiotics.    Diagnosis: HIV disease  Assessment and Plan of Treatment: Continue to take your HIV medication as prescribed ( Biktarvy)  Follow up with your primary doctor.    Diagnosis: Chronic atrial fibrillation  Assessment and Plan of Treatment: You have history of afib, & noted with afib with RVR. You were treated with medications to control your heart rate.  Continue to take your medications as prescribed, & follow up with your doctor.   Atrial fibrillation is the most common heart rhythm problem & has the risk of stroke & heart attack  It helps if you control your blood pressure, not drink more than 1-2 alcohol drinks per day, cut down on caffeine, getting treatment for over active thyroid gland, & getting exercise  Call your doctor if you feel your heart racing or beating unusually, chest tightness or pain, lightheaded, faint, shortness of breath especially with exercise  It is important to take your heart medication as prescribed  You may be on anticoagulation which is very important to take as directed - you may need blood work to monitor drug levels    Diagnosis: Right hemiparesis  Assessment and Plan of Treatment: You were also evaluated by neuroly team for right sided weakness concerning for stroke. All stroke work up was negative. You were treated for secndary stroke prevention measures. You were evaluated by PT who recommended rehab.

## 2024-10-15 NOTE — CONSULT NOTE ADULT - SUBJECTIVE AND OBJECTIVE BOX
Podiatry consulted for 84 year old male with R calf wound and L 2nd and 3rd digit wound. PMHx of HTN, PVD, BPH, HIV. The patient is accompanied by his daughter who explains that patient was admitted on 10/12 after she found her father on the floor for likely 3 days covered in urine/feces. States her dad was not answering he phone calls so she traveled from [place] and found that he had fallen in his apartment. The patient states he developed L 2nd and 3rd digit wound before he sustained fall. He states he injured the foot by accident when he closed a door over his foot. The daughter and the patient are unsure how the father developed the leg wound, but daughter believes it may be due to the fall he sustained.  The patient complains of the chills and states he always feels like his body is cold. He denies other constitutional symptoms, such as fever, nausea, or vomiting.     HPI:  84-year-old man from home with hypertension, PVD, BPH, HIV (on Genvoya??) coming with his daughter after she found him on the floor.  She had been trying to get in touch with him for 3 days but was unable so came up to [place] from [place], where she had recently moved, and found him on the floor covered in feces and urine.  He was confused at the time.  He does not recall details about how he got to the floor.  Per daughter, he is independent and mentate as well at baseline. On exam, he appears to be A&O x 3 but he is very difficult to understand.  He appears very dry and sounds like he is swallowing his tongue.  His daughter has difficulty understanding him as well.    Exam is notable for very dry appearance, garbled speech, rhonchi on all anterior lung fields, saturating 88% on room air and requiring 3 L nasal cannula, bilateral lower extremity pitting edema with old and new wounds on the legs, and an abrasion on the forehead.     ED Course    Vitals: HRmax 115, O2 88% on RA, requiring 3L NC  Labs: WBC 13, plt 82, trops 550>538, lactate 2.3>2.9, BUN/Cr 39/1.26, gluc 64, , UA with RBC, granular casts, protein  Intervention: s/p zosyn, 2.5L IVF  Consults:   Images: CTH wnl; CXR with bl LL consolidations on wet read        (12 Oct 2024 21:33)      Podiatry HPI    PMH:HIV (human immunodeficiency virus infection)    Umbilical hernia    Essential hypertension    Cardiomegaly    Elevated PSA    Urge incontinence    PVD (peripheral vascular disease)    Shingles    Enlarged prostate    Unilateral inguinal hernia, without obstruction or gangrene, not specified as recurrent      Allergies: No Known Allergies    Medications: albuterol/ipratropium for Nebulization 3 milliLiter(s) Nebulizer every 6 hours  aluminum hydroxide/magnesium hydroxide/simethicone Suspension 30 milliLiter(s) Oral every 4 hours PRN  ascorbic acid 500 milliGRAM(s) Oral daily  benzonatate 100 milliGRAM(s) Oral every 8 hours PRN  cefTRIAXone   IVPB 2000 milliGRAM(s) IV Intermittent every 24 hours  folic acid 1 milliGRAM(s) Oral <User Schedule>  guaiFENesin  milliGRAM(s) Oral every 12 hours PRN  heparin   Injectable 5000 Unit(s) SubCutaneous every 12 hours  influenza  Vaccine (HIGH DOSE) 0.5 milliLiter(s) IntraMuscular once  melatonin 3 milliGRAM(s) Oral at bedtime PRN  metoprolol tartrate 25 milliGRAM(s) Oral two times a day  multivitamin 1 Tablet(s) Oral daily  ondansetron Injectable 4 milliGRAM(s) IV Push every 8 hours PRN  pantoprazole    Tablet 40 milliGRAM(s) Oral before breakfast  rosuvastatin 20 milliGRAM(s) Oral at bedtime  sodium chloride 3%  Inhalation 4 milliLiter(s) Inhalation every 12 hours  tamsulosin 0.4 milliGRAM(s) Oral at bedtime  vancomycin  IVPB      vancomycin  IVPB 1000 milliGRAM(s) IV Intermittent every 12 hours    FH:No pertinent family history in first degree relatives      PSX: S/P hernia repair    History of cystoscopy    PVD (peripheral vascular disease)    S/P cataract surgery      SH: albuterol/ipratropium for Nebulization 3 milliLiter(s) Nebulizer every 6 hours  aluminum hydroxide/magnesium hydroxide/simethicone Suspension 30 milliLiter(s) Oral every 4 hours PRN  ascorbic acid 500 milliGRAM(s) Oral daily  benzonatate 100 milliGRAM(s) Oral every 8 hours PRN  cefTRIAXone   IVPB 2000 milliGRAM(s) IV Intermittent every 24 hours  folic acid 1 milliGRAM(s) Oral <User Schedule>  guaiFENesin  milliGRAM(s) Oral every 12 hours PRN  heparin   Injectable 5000 Unit(s) SubCutaneous every 12 hours  influenza  Vaccine (HIGH DOSE) 0.5 milliLiter(s) IntraMuscular once  melatonin 3 milliGRAM(s) Oral at bedtime PRN  metoprolol tartrate 25 milliGRAM(s) Oral two times a day  multivitamin 1 Tablet(s) Oral daily  ondansetron Injectable 4 milliGRAM(s) IV Push every 8 hours PRN  pantoprazole    Tablet 40 milliGRAM(s) Oral before breakfast  rosuvastatin 20 milliGRAM(s) Oral at bedtime  sodium chloride 3%  Inhalation 4 milliLiter(s) Inhalation every 12 hours  tamsulosin 0.4 milliGRAM(s) Oral at bedtime  vancomycin  IVPB      vancomycin  IVPB 1000 milliGRAM(s) IV Intermittent every 12 hours      Vital Signs Last 24 Hrs  T(C): 36.5 (15 Oct 2024 15:39), Max: 36.9 (14 Oct 2024 19:00)  T(F): 97.7 (15 Oct 2024 15:39), Max: 98.4 (14 Oct 2024 19:00)  HR: 97 (15 Oct 2024 15:39) (69 - 147)  BP: 132/76 (15 Oct 2024 15:39) (86/51 - 132/76)  BP(mean): 71 (14 Oct 2024 19:00) (66 - 71)  RR: 18 (15 Oct 2024 15:39) (18 - 20)  SpO2: 98% (15 Oct 2024 15:39) (93% - 100%)    Parameters below as of 15 Oct 2024 15:39  Patient On (Oxygen Delivery Method): nasal cannula  O2 Flow (L/min): 2      LABS                        11.3   7.67  )-----------( 55       ( 15 Oct 2024 07:10 )             34.0               10-15    146[H]  |  117[H]  |  18  ----------------------------<  113[H]  3.7   |  25  |  0.88    Ca    7.7[L]      15 Oct 2024 07:10  Phos  2.2     10-15  Mg     2.1     10-15    TPro  4.6[L]  /  Alb  1.8[L]  /  TBili  1.1  /  DBili  x   /  AST  59[H]  /  ALT  36  /  AlkPhos  63  10-14      ROS  REVIEW OF SYSTEMS:    CONSTITUTIONAL: No fever, weight loss, or fatigue  EYES: No eye pain, visual disturbances, or discharge  ENMT:  No difficulty hearing, tinnitus, vertigo; No sinus or throat pain  NECK: No pain or stiffness  BREASTS: No pain, masses, or nipple discharge  RESPIRATORY: No cough, wheezing, chills or hemoptysis; No shortness of breath  CARDIOVASCULAR: No chest pain, palpitations, dizziness, or leg swelling  GASTROINTESTINAL: No abdominal or epigastric pain. No nausea, vomiting, or hematemesis; No diarrhea or constipation. No melena or hematochezia.  GENITOURINARY: No dysuria, frequency, hematuria, or incontinence  NEUROLOGICAL: No headaches, memory loss, loss of strength, numbness, or tremors  SKIN: No itching, burning, rashes, or lesions   LYMPH NODES: No enlarged glands  ENDOCRINE: No heat or cold intolerance; No hair loss  MUSCULOSKELETAL: No joint pain or swelling; No muscle, back, or extremity pain  PSYCHIATRIC: No depression, anxiety, mood swings, or difficulty sleeping  HEME/LYMPH: No easy bruising, or bleeding gums  ALLERY AND IMMUNOLOGIC: No hives or eczema      PHYSICAL EXAM  LE Focused:    Vasc:  DP/PT pulses non-palpable B/L. 2+ pitting edema to B/L LE. TG: warm to cool, No varicosities  Derm: 2.7 x 1.2 right leg wound with fibrotic wound base. Submet 5 callus to R foot. Small blisters dorsal to the PIPJ of L 2nd and 3rd digits. +Maceration. No IDM. No purulence. No soft tissue crepitance. No fluctuance. No clinical signs of infection.   Neuro: Protective sensation is intact  MSK: Deferred    CULTURES: Culture Results:   Commensal javy consistent with body site (10-14 @ 06:14)  Culture Results:   No growth at 24 hours (10-13 @ 13:45)  Culture Results:   No growth at 24 hours (10-13 @ 13:30)  Culture Results:   Growth in aerobic bottle: Staphylococcus epidermidis  "Susceptibilities not performed"  Growth in aerobic and anaerobic bottles: Aerococcus viridans  Growth in anaerobic bottle: Aerococcus urinae  Isolates of Aerococcus spp. are predictably susceptible to penicillin,  ampicillin, and vancomycin. All isolates are resistant to sulfonamides. (10-12 @ 15:20)  Culture Results:   Growth in aerobic and anaerobic bottles: Aerococcus viridans  Growth in anaerobic bottle: Aerococcus urinae  Isolates of Aerococcus spp. are predictably susceptible to penicillin,  ampicillin, and vancomycin. All isolates are resistant to sulfonamides.  Direct identification is available within approximately 3-5  hours either by Blood Panel Multiplexed PCR or Direct  MALDI-TOF. Details: [email or web address] (10-12 @ 15:15)

## 2024-10-15 NOTE — PROGRESS NOTE ADULT - PROBLEM SELECTOR PLAN 2
secondary to pneumonia, Aerococcus viridans, Aerococcus urinae bacteremia, multiple wounds  -UA, MRSA PCR negative  -CXR with bilateral infiltrates   continue ceftriaxone and vancomycin  repeat blood culture negative  f/u TTE  f/u Vanco trough in 10/16 11:00pm (23:00)  f/u doppler, CRISTIAN  f/u right foot x-ray  f/u speech and swallow evaluation  Vascular consult on hold- pending cristian results  Podiatry consulted  ID Dr Andres Afib with RVR- loveley from infection  uncontrolled  's  Digoxin 0.5mg IVP x1 given  patient hypotensive, likely from RVR- nifedipine held  elevated troponin likely ischemic demand   continue telemetry  f/u am add on- will trend  Cardiology Dr. Daniel Afib with RVR- likley from infection  uncontrolled  's  Digoxin 0.5mg IVP x1 given  patient hypotensive, likely from RVR- nifedipine held  elevated troponin likely ischemic demand   continue telemetry  f/u am add on- will trend  restarted metoprolol 25mg BID  Cardiology Dr. Daniel

## 2024-10-15 NOTE — DIETITIAN INITIAL EVALUATION ADULT - OTHER INFO
Patient has multiple chronic medical conditions such as essential HTN, shingle, HIV, thrombocytopenia, BPH, bacteremia, systemic inflammatory response syndrome, inguinal hernia, PVD, rhabdomyolysis, incontinence.

## 2024-10-15 NOTE — DIETITIAN INITIAL EVALUATION ADULT - PERTINENT MEDS FT
MEDICATIONS  (STANDING):  albuterol/ipratropium for Nebulization 3 milliLiter(s) Nebulizer every 6 hours  ascorbic acid 500 milliGRAM(s) Oral daily  cefTRIAXone   IVPB 2000 milliGRAM(s) IV Intermittent every 24 hours  cilostazol 100 milliGRAM(s) Oral two times a day  clopidogrel Tablet 75 milliGRAM(s) Oral daily  digoxin  Injectable 500 MICROGram(s) IV Push once  heparin   Injectable 5000 Unit(s) SubCutaneous every 12 hours  influenza  Vaccine (HIGH DOSE) 0.5 milliLiter(s) IntraMuscular once  multivitamin 1 Tablet(s) Oral daily  rosuvastatin 20 milliGRAM(s) Oral at bedtime  sodium chloride 3%  Inhalation 4 milliLiter(s) Inhalation every 12 hours  tamsulosin 0.4 milliGRAM(s) Oral at bedtime  vancomycin  IVPB      vancomycin  IVPB 1000 milliGRAM(s) IV Intermittent every 12 hours    MEDICATIONS  (PRN):  aluminum hydroxide/magnesium hydroxide/simethicone Suspension 30 milliLiter(s) Oral every 4 hours PRN Dyspepsia  benzonatate 100 milliGRAM(s) Oral every 8 hours PRN Cough  guaiFENesin  milliGRAM(s) Oral every 12 hours PRN Cough  melatonin 3 milliGRAM(s) Oral at bedtime PRN Insomnia  ondansetron Injectable 4 milliGRAM(s) IV Push every 8 hours PRN Nausea and/or Vomiting

## 2024-10-15 NOTE — PHYSICAL THERAPY INITIAL EVALUATION ADULT - GENERAL OBSERVATIONS, REHAB EVAL
Consult received, chart reviewed. Patient received supine in bed, NAD, + 2L O2 via NC, Tele, CAIR Boots. Patient agreed to EVALUATION from Physical Therapist.

## 2024-10-15 NOTE — PROGRESS NOTE ADULT - PROBLEM SELECTOR PLAN 7
pharmacy verbalizes does not have HIV meds  patient is undetected likely in the setting of sepsis and HIV  -Plt count 82-->57  -No signs of active bleeding

## 2024-10-15 NOTE — PROGRESS NOTE ADULT - PROBLEM SELECTOR PLAN 1
secondary to pneumonia  continue ceftriaxone and vancomycin  atypical work up negative  completed azithromycin  maintain SPO2 94%< RA  continue Chest PT   starting hypertonic saline nebulizer  continue duoneb  incentive spirometer   continue tessalon pearls and mucinex PRN

## 2024-10-15 NOTE — DIETITIAN INITIAL EVALUATION ADULT - ADD RECOMMEND
Change consistence to puree, check SLP rec's and check tolerance, allow scrambled eggs at breakfast, check weights, intake.

## 2024-10-15 NOTE — DISCHARGE NOTE PROVIDER - CARE PROVIDER_API CALL
Ernesto Daniel  Cardiology  [address] [place] [address]  Phone: [phone]  Fax: [phone]  Established Patient  Follow Up Time:

## 2024-10-15 NOTE — CHART NOTE - NSCHARTNOTEFT_GEN_A_CORE
EVENT:  83y/o M from home with HTN, PVD, BPH, HIV (on Genvoya??) coming with his daughter after she found him on the floor. She had been trying to get in touch with him for 3 days but was unable so came up to [place] from [place], where she had recently moved, and found him on the floor covered in feces and urine. He was confused at the time. He does not recall details about how he got to the floor.  Per daughter, he is independent and mentate as well at baseline. On exam, he appears to be A&O x 3 but he is very difficult to understand.  He appears very dry and sounds like he is swallowing his tongue.  His daughter has difficulty understanding him as well.    Exam is notable for very dry appearance, garbled speech, rhonchi on all anterior lung fields, saturating 88% on room air and requiring 3 L nasal cannula, bilateral lower extremity pitting edema with old and new wounds on the legs, and an abrasion on the forehead.     SUBJECTIVE:  Patient was admitted for Sepsis, Bacteremia. Noted to have soft BP, IVF fluid was given twice yesterday.   Coarse crackles, gentle hydration  Afebrile  BP was 91/52, after bolus, 107/59, HR 96    OBJECTIVE:  Vital Signs Last 24 Hrs  T(C): 36.4 (14 Oct 2024 23:24), Max: 36.9 (14 Oct 2024 15:00)  T(F): 97.5 (14 Oct 2024 23:24), Max: 98.4 (14 Oct 2024 15:00)  HR: 97 (15 Oct 2024 03:09) (90 - 118)  BP: 107/59 (15 Oct 2024 03:09) (81/38 - 127/73)  BP(mean): 71 (14 Oct 2024 19:00) (52 - 71)  RR: 20 (15 Oct 2024 00:50) (18 - 30)  SpO2: 96% (15 Oct 2024 00:50) (92% - 100%)    Parameters below as of 15 Oct 2024 00:50  Patient On (Oxygen Delivery Method): nasal cannula  O2 Flow (L/min): 2    LABS:                        12.7   7.96  )-----------( 57       ( 14 Oct 2024 06:40 )             37.8     10-14    147[H]  |  118[H]  |  21[H]  ----------------------------<  131[H]  3.8   |  24  |  1.02    Ca    7.6[L]      14 Oct 2024 17:00  Phos  1.9     10-14  Mg     2.0     10-14    TPro  4.6[L]  /  Alb  1.8[L]  /  TBili  1.1  /  DBili  x   /  AST  59[H]  /  ALT  36  /  AlkPhos  63  10-14    Problem: Sepsis   due to bacteremia, PNA  repeat cxr shows increasing bibasilar fluid accumulation  Plan:  -low BP and tachycardia, s/p 500cc NS bolus this shift  -continue ceftriaxone and vancomycin  -ID Dmitry following  -Cardio Daniel on board  -monitor BP, HR  -continue 2L oxygen via nasal cannula

## 2024-10-15 NOTE — PROGRESS NOTE ADULT - ASSESSMENT
84-year-old man from home with hypertension, PVD, BPH, HIV. Patient baseline A+Ox3. Patient present to ED lethargic after being found by daughter on floor covered in feces and urine. Patient admitted telemetry for sepsis, afib with  RVR Acute hypoxic respiratory failure secondary to pneumonia. CT head no acute hemorrhage or infarct, with microvascular changes and volume loss. Chest x-ray with moderate left loculated effusion. Initial blood culture resulting Staphylococcus epidermidis-likely contaminate, Aerococcus viridans, Aerococcus urinae, Isolates of Aerococcus spp. are predictably susceptible to penicillin, ampicillin, and vancomycin. All isolates are resistant to sulfonamides. Repeat blood culture negative. ID consulted. Patient treated with Rocephin and Vancomycin. Patient with multiple wounds, Wound care consulted. Podiatry consulted.

## 2024-10-16 ENCOUNTER — RESULT REVIEW (OUTPATIENT)
Age: 84
End: 2024-10-16

## 2024-10-16 DIAGNOSIS — I73.9 PERIPHERAL VASCULAR DISEASE, UNSPECIFIED: ICD-10-CM

## 2024-10-16 LAB
ALBUMIN SERPL ELPH-MCNC: 1.8 G/DL — LOW (ref 3.5–5)
ALP SERPL-CCNC: 79 U/L — SIGNIFICANT CHANGE UP (ref 40–120)
ALT FLD-CCNC: 40 U/L DA — SIGNIFICANT CHANGE UP (ref 10–60)
ANION GAP SERPL CALC-SCNC: 6 MMOL/L — SIGNIFICANT CHANGE UP (ref 5–17)
AST SERPL-CCNC: 61 U/L — HIGH (ref 10–40)
BASOPHILS # BLD AUTO: 0 K/UL — SIGNIFICANT CHANGE UP (ref 0–0.2)
BASOPHILS NFR BLD AUTO: 0 % — SIGNIFICANT CHANGE UP (ref 0–2)
BILIRUB SERPL-MCNC: 0.7 MG/DL — SIGNIFICANT CHANGE UP (ref 0.2–1.2)
BUN SERPL-MCNC: 14 MG/DL — SIGNIFICANT CHANGE UP (ref 7–18)
CALCIUM SERPL-MCNC: 8 MG/DL — LOW (ref 8.4–10.5)
CHLORIDE SERPL-SCNC: 115 MMOL/L — HIGH (ref 96–108)
CO2 SERPL-SCNC: 24 MMOL/L — SIGNIFICANT CHANGE UP (ref 22–31)
CREAT SERPL-MCNC: 0.87 MG/DL — SIGNIFICANT CHANGE UP (ref 0.5–1.3)
EGFR: 85 ML/MIN/1.73M2 — SIGNIFICANT CHANGE UP
EOSINOPHIL # BLD AUTO: 0.03 K/UL — SIGNIFICANT CHANGE UP (ref 0–0.5)
EOSINOPHIL NFR BLD AUTO: 0.4 % — SIGNIFICANT CHANGE UP (ref 0–6)
GLUCOSE SERPL-MCNC: 91 MG/DL — SIGNIFICANT CHANGE UP (ref 70–99)
HCT VFR BLD CALC: 35.9 % — LOW (ref 39–50)
HGB BLD-MCNC: 11.9 G/DL — LOW (ref 13–17)
IMM GRANULOCYTES NFR BLD AUTO: 0.7 % — SIGNIFICANT CHANGE UP (ref 0–0.9)
LYMPHOCYTES # BLD AUTO: 0.72 K/UL — LOW (ref 1–3.3)
LYMPHOCYTES # BLD AUTO: 8.5 % — LOW (ref 13–44)
MAGNESIUM SERPL-MCNC: 2.3 MG/DL — SIGNIFICANT CHANGE UP (ref 1.6–2.6)
MCHC RBC-ENTMCNC: 32.1 PG — SIGNIFICANT CHANGE UP (ref 27–34)
MCHC RBC-ENTMCNC: 33.1 GM/DL — SIGNIFICANT CHANGE UP (ref 32–36)
MCV RBC AUTO: 96.8 FL — SIGNIFICANT CHANGE UP (ref 80–100)
MONOCYTES # BLD AUTO: 0.45 K/UL — SIGNIFICANT CHANGE UP (ref 0–0.9)
MONOCYTES NFR BLD AUTO: 5.3 % — SIGNIFICANT CHANGE UP (ref 2–14)
NEUTROPHILS # BLD AUTO: 7.26 K/UL — SIGNIFICANT CHANGE UP (ref 1.8–7.4)
NEUTROPHILS NFR BLD AUTO: 85.1 % — HIGH (ref 43–77)
NRBC # BLD: 0 /100 WBCS — SIGNIFICANT CHANGE UP (ref 0–0)
PHOSPHATE SERPL-MCNC: 2.2 MG/DL — LOW (ref 2.5–4.5)
PLATELET # BLD AUTO: 72 K/UL — LOW (ref 150–400)
POTASSIUM SERPL-MCNC: 4.1 MMOL/L — SIGNIFICANT CHANGE UP (ref 3.5–5.3)
POTASSIUM SERPL-SCNC: 4.1 MMOL/L — SIGNIFICANT CHANGE UP (ref 3.5–5.3)
PROT SERPL-MCNC: 4.8 G/DL — LOW (ref 6–8.3)
RBC # BLD: 3.71 M/UL — LOW (ref 4.2–5.8)
RBC # FLD: 14.5 % — SIGNIFICANT CHANGE UP (ref 10.3–14.5)
SODIUM SERPL-SCNC: 145 MMOL/L — SIGNIFICANT CHANGE UP (ref 135–145)
WBC # BLD: 8.52 K/UL — SIGNIFICANT CHANGE UP (ref 3.8–10.5)
WBC # FLD AUTO: 8.52 K/UL — SIGNIFICANT CHANGE UP (ref 3.8–10.5)

## 2024-10-16 PROCEDURE — 93325 DOPPLER ECHO COLOR FLOW MAPG: CPT | Mod: 26

## 2024-10-16 PROCEDURE — 99232 SBSQ HOSP IP/OBS MODERATE 35: CPT

## 2024-10-16 PROCEDURE — 99233 SBSQ HOSP IP/OBS HIGH 50: CPT

## 2024-10-16 PROCEDURE — 93312 ECHO TRANSESOPHAGEAL: CPT | Mod: 26

## 2024-10-16 PROCEDURE — 99223 1ST HOSP IP/OBS HIGH 75: CPT

## 2024-10-16 PROCEDURE — 93320 DOPPLER ECHO COMPLETE: CPT | Mod: 26

## 2024-10-16 RX ORDER — FUROSEMIDE 40 MG
20 TABLET ORAL ONCE
Refills: 0 | Status: COMPLETED | OUTPATIENT
Start: 2024-10-16 | End: 2024-10-16

## 2024-10-16 RX ORDER — DIBASIC SODIUM PHOSPHATE, MONOBASIC POTASSIUM PHOSPHATE AND MONOBASIC SODIUM PHOSPHATE 852; 155; 130 MG/1; MG/1; MG/1
2 TABLET ORAL
Refills: 0 | Status: COMPLETED | OUTPATIENT
Start: 2024-10-16 | End: 2024-10-16

## 2024-10-16 RX ADMIN — Medication 500 MILLIGRAM(S): at 15:26

## 2024-10-16 RX ADMIN — FOLIC ACID 1 MILLIGRAM(S): 1 TABLET ORAL at 17:58

## 2024-10-16 RX ADMIN — IPRATROPIUM BROMIDE AND ALBUTEROL SULFATE 3 MILLILITER(S): .5; 2.5 SOLUTION RESPIRATORY (INHALATION) at 14:53

## 2024-10-16 RX ADMIN — Medication 20 MILLIGRAM(S): at 10:04

## 2024-10-16 RX ADMIN — SODIUM CHLORIDE 4 MILLILITER(S): 9 INJECTION, SOLUTION INTRAMUSCULAR; INTRAVENOUS; SUBCUTANEOUS at 08:40

## 2024-10-16 RX ADMIN — Medication 1 TABLET(S): at 15:26

## 2024-10-16 RX ADMIN — DIBASIC SODIUM PHOSPHATE, MONOBASIC POTASSIUM PHOSPHATE AND MONOBASIC SODIUM PHOSPHATE 2 TABLET(S): 852; 155; 130 TABLET ORAL at 17:55

## 2024-10-16 RX ADMIN — Medication 100 MILLIGRAM(S): at 01:59

## 2024-10-16 RX ADMIN — VANCOMYCIN HYDROCHLORIDE 250 MILLIGRAM(S): KIT at 13:28

## 2024-10-16 RX ADMIN — IPRATROPIUM BROMIDE AND ALBUTEROL SULFATE 3 MILLILITER(S): .5; 2.5 SOLUTION RESPIRATORY (INHALATION) at 21:05

## 2024-10-16 RX ADMIN — HEPARIN SODIUM 5000 UNIT(S): 10000 INJECTION INTRAVENOUS; SUBCUTANEOUS at 17:55

## 2024-10-16 RX ADMIN — VANCOMYCIN HYDROCHLORIDE 250 MILLIGRAM(S): KIT at 00:32

## 2024-10-16 RX ADMIN — Medication 20 MILLIGRAM(S): at 21:52

## 2024-10-16 RX ADMIN — SODIUM CHLORIDE 4 MILLILITER(S): 9 INJECTION, SOLUTION INTRAMUSCULAR; INTRAVENOUS; SUBCUTANEOUS at 20:27

## 2024-10-16 RX ADMIN — Medication 25 MILLIGRAM(S): at 17:56

## 2024-10-16 RX ADMIN — Medication 0.4 MILLIGRAM(S): at 21:52

## 2024-10-16 RX ADMIN — HEPARIN SODIUM 5000 UNIT(S): 10000 INJECTION INTRAVENOUS; SUBCUTANEOUS at 06:54

## 2024-10-16 RX ADMIN — IPRATROPIUM BROMIDE AND ALBUTEROL SULFATE 3 MILLILITER(S): .5; 2.5 SOLUTION RESPIRATORY (INHALATION) at 08:40

## 2024-10-16 NOTE — PROGRESS NOTE ADULT - ATTENDING COMMENTS
Patient seen and examined this morning after return from HEATHER and later with Daughter visiting from [place] at bedside    85yo m with  PMH of HTN, HD, PAD s/p bilateral LE stents, BPH, HIV, p/w ams following presumed unwitnessed fall amd prolonged unsuspecified  time on floor for up to 3 days possibly.; in er, found to be meeting severe sirs/sepsis criteria + in AHRF requiring 3 lpm via nc; admitted with suspected aspiration iso fall, dehydration    Patient s/p HEATHER was drowsy and sleepy this morning but later this afternoon noted sitting up and eating lunch with daughter at bedside; more coherent speech today; Chest appears congested. No acute distress; denies any complaints; dose of IV Lasix was given this morning prior to HEATHER    Vital Signs Last 24 Hrs  T(C): 36.7 (16 Oct 2024 19:39), Max: 36.9 (16 Oct 2024 11:16)  T(F): 98.1 (16 Oct 2024 19:39), Max: 98.4 (16 Oct 2024 11:16)  HR: 98 (16 Oct 2024 19:39) (92 - 98)  BP: 129/77 (16 Oct 2024 19:39) (127/85 - 142/77)  RR: 18 (16 Oct 2024 19:39) (18 - 18)  SpO2: 100% (16 Oct 2024 19:39) (98% - 100%) nasal cannula  O2 Flow (L/min): 2    P/E:  elderly male, appear chronically ill and somewhat disheveled,   Neuro: AAO x 2.5 today; improved mentation  Chest : B/L Coarse breath sounds  CVS: S1S2 present  Abdomen, soft, BS+, NT; mildly distended  Ext: significant edema of RUE and RLE with skin breakdown, right (may be portal of entry for bacteremia)    Labs:                               11.9   8.52  )-----------( 72       ( 16 Oct 2024 06:44 )             35.9   10-16    145  |  115[H]  |  14  ----------------------------<  91  4.1   |  24  |  0.87  Ca    8.0[L]      16 Oct 2024 06:44  Phos  2.2     10-16  Mg     2.3     10-16  TPro  4.8[L]  /  Alb  1.8[L]  /  TBili  0.7  /  DBili  x   /  AST  61[H]  /  ALT  40  /  AlkPhos  79  10-16    Folate, Serum (10.14.24 @ 06:40) Folate, Serum: <2.0: Test Repeated ng/mL  Thyroid Stimulating Hormone, Serum: 2.25 uU/mL (10.14.24 @ 06:40)     Lactate, Blood (10.14.24 @ 06:40) Lactate, Blood: 1.5 mmol/L  Lactate, Blood (10.12.24 @ 19:00) Lactate, Blood: 2.9:    Culture - Blood (10.12.24 @ 15:15)   Growth in aerobic and anaerobic bottles: Aerococcus viridans   Growth in anaerobic bottle: Aerococcus urinae     Xray Chest 1 View AP/PA (10.14.24 @ 09:00)   IMPRESSION: Increasing bibasilar fluid accumulations.    CT Head No Cont (10.12.24 @ 18:44)   Impression: Normal CT of the brain.    A/P:  Sepsis with Bacteremia due to Aerococcus viridans etiology ?LE Ulcers   concern for Endocarditis clinically doubt  A. Fib with RVR ppt by severe infection much improved HR  Acute Hypoxic Resp. failure with Suspected Aspiration Pneumonia  Right facial droop and Right hemiparesis  Severe folate deficiency  B/L LE edema etiology ?? Venous insufficiency vs   Lactic acidosis possibly starvation ketoacidosis  Hypernatremia secondary to dehydration  Rhabdomyolysis due to fall and prolonged immobilization  Hx PAD with B/L stents  Elevated troponin likely demand ischemia    Plan:  Continue IV Vanco and Ceftriaxone for now  ID consult; d/w Dr. Andres recommend HEATHER; HEATHER negative for Endocarditis; Discussed with Cardio Dr. Rodriguez;   Cardiology f/u Dr. Daniel appreciated; better controlled HR s/p Digoxin and resuming low dose Metoprolol 25 mg q 12 hrs  Right hemiparesis, r/o CNS emboli vs CNS mass; will get Neuro eval; will benefit from an MRI with and without contrast in my opinion given Hx HIV  Repeat Blood Cx testing; so far negative; as d/w ID will likely Rx with ABX x 2 weeks as HEATHER negative  Nutrition consultation; Add Ensure BID  Follow up HIV VL and CD4, resume ARV,   PT/ OT eval  Social work/ Case mgmt consult for disposition once medically stable; critically ill at this time  .  Discussed with Daughter at bedside and reviewed findings and plan of care as above  Discussed with NP Dannielle

## 2024-10-16 NOTE — PROGRESS NOTE ADULT - SUBJECTIVE AND OBJECTIVE BOX
Patient is a 84y old  Male who presents with a chief complaint of sepsis (16 Oct 2024 08:11)      INTERVAL HPI/OVERNIGHT EVENTS: no overnight events    I&O's Summary    15 Oct 2024 07:01  -  16 Oct 2024 07:00  --------------------------------------------------------  IN: 300 mL / OUT: 0 mL / NET: 300 mL      Vital Signs Last 24 Hrs  T(C): 36.8 (16 Oct 2024 07:29), Max: 36.8 (16 Oct 2024 07:29)  T(F): 98.2 (16 Oct 2024 07:29), Max: 98.2 (16 Oct 2024 07:29)  HR: 97 (16 Oct 2024 07:29) (79 - 147)  BP: 140/72 (16 Oct 2024 07:29) (102/54 - 140/72)  BP(mean): --  RR: 18 (16 Oct 2024 07:29) (18 - 18)  SpO2: 98% (16 Oct 2024 07:29) (95% - 100%)    Parameters below as of 16 Oct 2024 07:29  Patient On (Oxygen Delivery Method): nasal cannula  O2 Flow (L/min): 2    PAST MEDICAL & SURGICAL HISTORY:  HIV (human immunodeficiency virus infection)      Umbilical hernia      Essential hypertension      Cardiomegaly      Elevated PSA      Urge incontinence      PVD (peripheral vascular disease)      Shingles      Enlarged prostate      Unilateral inguinal hernia, without obstruction or gangrene, not specified as recurrent      S/P hernia repair  right inguinal - 2013      History of cystoscopy  with photovaporization, green light laser lithotripsy 2016      PVD (peripheral vascular disease)  bilateral leg stents 2020      S/P cataract surgery          SOCIAL HISTORY  Alcohol:  Tobacco:  Illicit substance use:      FAMILY HISTORY:      LABS:                        11.9   8.52  )-----------( 72       ( 16 Oct 2024 06:44 )             35.9     10-16    145  |  115[H]  |  14  ----------------------------<  91  4.1   |  24  |  0.87    Ca    8.0[L]      16 Oct 2024 06:44  Phos  2.2     10-16  Mg     2.3     10-16    TPro  4.8[L]  /  Alb  1.8[L]  /  TBili  0.7  /  DBili  x   /  AST  61[H]  /  ALT  40  /  AlkPhos  79  10-16      Urinalysis Basic - ( 16 Oct 2024 06:44 )    Color: x / Appearance: x / SG: x / pH: x  Gluc: 91 mg/dL / Ketone: x  / Bili: x / Urobili: x   Blood: x / Protein: x / Nitrite: x   Leuk Esterase: x / RBC: x / WBC x   Sq Epi: x / Non Sq Epi: x / Bacteria: x      CAPILLARY BLOOD GLUCOSE      Urinalysis Basic - ( 16 Oct 2024 06:44 )    Color: x / Appearance: x / SG: x / pH: x  Gluc: 91 mg/dL / Ketone: x  / Bili: x / Urobili: x   Blood: x / Protein: x / Nitrite: x   Leuk Esterase: x / RBC: x / WBC x   Sq Epi: x / Non Sq Epi: x / Bacteria: x        MEDICATIONS  (STANDING):  albuterol/ipratropium for Nebulization 3 milliLiter(s) Nebulizer every 6 hours  ascorbic acid 500 milliGRAM(s) Oral daily  cefTRIAXone   IVPB 2000 milliGRAM(s) IV Intermittent every 24 hours  folic acid 1 milliGRAM(s) Oral <User Schedule>  furosemide   Injectable 20 milliGRAM(s) IV Push once  heparin   Injectable 5000 Unit(s) SubCutaneous every 12 hours  influenza  Vaccine (HIGH DOSE) 0.5 milliLiter(s) IntraMuscular once  metoprolol tartrate 25 milliGRAM(s) Oral two times a day  multivitamin 1 Tablet(s) Oral daily  pantoprazole    Tablet 40 milliGRAM(s) Oral before breakfast  potassium phosphate / sodium phosphate Tablet (K-PHOS No. 2) 2 Tablet(s) Oral two times a day  rosuvastatin 20 milliGRAM(s) Oral at bedtime  sodium chloride 3%  Inhalation 4 milliLiter(s) Inhalation every 12 hours  tamsulosin 0.4 milliGRAM(s) Oral at bedtime  vancomycin  IVPB      vancomycin  IVPB 1000 milliGRAM(s) IV Intermittent every 12 hours    MEDICATIONS  (PRN):  aluminum hydroxide/magnesium hydroxide/simethicone Suspension 30 milliLiter(s) Oral every 4 hours PRN Dyspepsia  benzonatate 100 milliGRAM(s) Oral every 8 hours PRN Cough  guaiFENesin  milliGRAM(s) Oral every 12 hours PRN Cough  melatonin 3 milliGRAM(s) Oral at bedtime PRN Insomnia  ondansetron Injectable 4 milliGRAM(s) IV Push every 8 hours PRN Nausea and/or Vomiting      REVIEW OF SYSTEMS:  CONSTITUTIONAL: No fever  EYES: No eye pain, visual disturbances  ENMT:  No sinus or throat pain  NECK: No pain   RESPIRATORY: No cough, wheezing. + shortness of breath  CARDIOVASCULAR: No chest pain, palpitations  GASTROINTESTINAL: No abdominal pain. No nausea, vomiting. No diarrhea or constipation.   GENITOURINARY: No dysuria. + frequency  NEUROLOGICAL: No headaches  SKIN: No itching, burning, rashes  MUSCULOSKELETAL: No joint pain     RADIOLOGY & ADDITIONAL TESTS:  < from: Xray Chest 1 View-PORTABLE IMMEDIATE (10.12.24 @ 17:14) >    ACC: [id number] EXAM:  XR CHEST PORTABLE IMMED 1V   ORDERED BY: ESPERANZA LANDRUM     PROCEDURE DATE:  10/12/2024          INTERPRETATION:  INDICATIONS: Sepsis    Prior examination for comparison: 10/6/2012    Technique: AP view of chest    Findings:    Hazy bilateral lower lung zone opacities. Dense left lower lobe opacity.   There are bilateral small pleural effusions. The cardiomediastinal   silhouette is normal. Bones are grossly normal.    IMPRESSION: Hazy bibasilar opacities with aleft lower lung zone dense   opacity. Findings suggestive of multifocal pneumonia.    --- End of Report ---      < end of copied text >    ACC: [id number] EXAM:  CT BRAIN   ORDERED BY: ESPERANZA LANDRUM     PROCEDURE DATE:  10/12/2024          INTERPRETATION:  CLINICAL INFORMATION: fakk unwitnessed    COMPARISON: None.    CONTRAST:  IV Contrast: NONE  Complications: None reportedat time of study completion    Technique: CT head was performed utilizing axial images from the base of   the skull through the vertex without the administration of intravenous   contrast. Images were reviewed in the bone, brain and subdural windows.    Findings:    There is no evidence of acute intracranial hemorrhage or acute   transcortical infarct.  The ventricles are normal in size and caliber.   There are patchy areas of low density within the periventricular white   matter consistent with mild-to-moderate microvascular disease. There is   enlargement of the sulci, cisterns and ventricles consistent with mild   cerebral and cerebellar volume loss. There is no evidence of   hydrocephalus.  There is no evidence of mass-effect or midline shift. There is no   evidence of an intra or extra-axial fluid collection.    Visualized paranasal sinuses and bilateral mastoid air cells are clear.   There is scalp and subcutaneous soft tissue swelling and edema at the   vertex and involving the posterior right parietal occipital and left   temporal parietal and occipital scalp. No acute calvarial fractures are   identified.    Impression: Normal CT of the brain.        --- End of Report ---      ACC: [id number] EXAM:  XR CHEST AP OR PA 1V   ORDERED BY: SUJIT PARISH     PROCEDURE DATE:  10/14/2024          INTERPRETATION:  AP erect chest on October 14, 2024 at 8:32 AM. Patient   has rhonchi.    Heart difficult to assess.    There is a moderate left base effusion. There is a loculated effusion and   minor fissure.    These basilar findings have increased from October 12.    IMPRESSION: Increasing bibasilar fluid accumulations.    --- End of Report ---      Imaging Personally Reviewed:  [x ] YES  [ ] NO    Consultant(s) Notes Reviewed:  [x ] YES  [ ] NO    PHYSICAL EXAM:  GENERAL: NAD  HEAD:  Atraumatic, Normocephalic  EYES:  conjunctiva and sclera clear  ENMT: Moist mucous membranes  NECK: Supple  NERVOUS SYSTEM:  Alert & Oriented X3  CHEST/LUNG: + rhonchi  HEART: Regular rate and rhythm  ABDOMEN: Soft, TTP RLQ, Nondistended; Bowel sounds present  EXTREMITIES: No clubbing, cyanosis, or edema  SKIN: + multiple skin tears    Care Collaborated Discussed with Consultants/Other Providers [ x] YES  [ ] NO

## 2024-10-16 NOTE — PROGRESS NOTE ADULT - PROBLEM SELECTOR PLAN 8
c/w flomax nonpalpable pulses dp/pt b/l.  f/u venous/arterial duplex studies  Vascular following reccs: f/u venous/arterial duplex studies (ordered)                                       local wound care per podiatry       L 2nd, 3rd digit wound & R leg ulcer  Podiatry following reccs: no clinical signs of acute infection                                      R leg dressed with Santyl/DSD                                      L toe wounds dressed with Betadine/DSD

## 2024-10-16 NOTE — PROGRESS NOTE ADULT - ASSESSMENT
83 y/o male admitted s/p fall with sepsis, aspiration pneumonia, and rhabdo, vascular consulted for nonpalpable pulses dp/pt b/l. Right lateral leg wound, left toe wounds, no signs of acute infection, compartments soft throughout   -f/u venous/arterial duplex studies (ordered)  -local wound care per podiatry   -care per medical team

## 2024-10-16 NOTE — SWALLOW BEDSIDE ASSESSMENT ADULT - COMMENTS
Pt AA+Ox3, HOB elevated to 90°. Daughter at bedside, states that Pt eats very soft foods at home since he does not like to wear his U&L dentures at meals ("he gums his food".)

## 2024-10-16 NOTE — PROGRESS NOTE ADULT - PROBLEM SELECTOR PLAN 2
Afib with RVR- likley from infection  uncontrolled  's  Digoxin 0.5mg IVP x1 given  patient hypotensive, likely from RVR- nifedipine held  elevated troponin likely ischemic demand   continue telemetry  f/u am add on- will trend  restarted metoprolol 25mg BID  Cardiology Dr. Daniel secondary to pneumonia, Aerococcus viridans, Aerococcus urinae bacteremia, multiple wounds  UA, MRSA PCR negative  Bcx (10/12)- aerococcus viridans, Aerococcus urinae, Bcx( 10/13)- NGTD  TTE- ef 20-25%;  Severe left ventricular hypertrophy. Consider cardiac MRI to evaluate for possible infiltrative cardiomyopathy.  f/u Vanco trough in 10/16 11:00pm (23:00)  f/u HEATHER (r/o endocarditis)  f/u Bcx (10/15)  ID Dr Andres reccs: if HEATHER negative then will give short course therapy of 2 weeks of IV antibiotics ( Vanco)                               if any evidence of Endocarditis then will need 4 weeks of IV antibiotics.    Plan as above.

## 2024-10-16 NOTE — PROGRESS NOTE ADULT - PROBLEM SELECTOR PLAN 12
f/u HEATHER ( then ID reccs for abx duration)  f/u Vanco trough in 10/16 11:00pm (23:00)  f/u doppler, CRISTIAN, & right foot x-ray  f/u speech and swallow evaluation      PT reccs ORALIA f/u HEATHER ( then ID reccs for abx duration)  f/u Vanco trough in 10/16 11:00pm (23:00)  f/u doppler, CRISTIAN, & right foot x-ray  f/u speech and swallow evaluation  f/u neuro reccs (consulted for rt sided hemiparesis)      PT reccs ORALIA

## 2024-10-16 NOTE — PROGRESS NOTE ADULT - PROBLEM SELECTOR PLAN 1
secondary to pneumonia  continue ceftriaxone and vancomycin  atypical work up negative  completed azithromycin  maintain SPO2 94%< RA  continue Chest PT   starting hypertonic saline nebulizer  continue duoneb  incentive spirometer   continue tessalon pearls and mucinex PRN p/w 02 88% on room air; secondary to suspected aspiration pneumonia  cxr-(10/12)- Hazy bibasilar opacities suggestive of multifocal pneumonia.  Cxr ( 10/14_ increased bibasilar fluid accumulation---> STAT lasix IVP x 1 dose ( 10/16 )  c/w ceftriaxone and vancomycin  completed azithromycin  atypical work up negative  maintain SPO2 94%  continue supplemental 02, Chest PT, duoneb & incentive spirometer   f/u s/s eval  ID Dmitry

## 2024-10-16 NOTE — PROGRESS NOTE ADULT - PROBLEM SELECTOR PLAN 7
likely in the setting of sepsis and HIV  -Plt count 82-->57  -No signs of active bleeding see plan as above

## 2024-10-16 NOTE — PROGRESS NOTE ADULT - SUBJECTIVE AND OBJECTIVE BOX
PATIENT SEEN AND EXAMINED BY ADEN ZHANG M.D. ON :- 10/16/24  DATE OF SERVICE: 10/16/24            Interim events noted,Labs ,Radiological studies and Cardiology tests reviewed .    Patient is a 84y old  Male who presents with a chief complaint of sepsis (16 Oct 2024 08:11)      HPI:  84-year-old man from home with hypertension, PVD, BPH, HIV (on Genvoya??) coming with his daughter after she found him on the floor.  She had been trying to get in touch with him for 3 days but was unable so came up to [place] from [place], where she had recently moved, and found him on the floor covered in feces and urine.  He was confused at the time.  He does not recall details about how he got to the floor.  Per daughter, he is independent and mentate as well at baseline. On exam, he appears to be A&O x 3 but he is very difficult to understand.  He appears very dry and sounds like he is swallowing his tongue.  His daughter has difficulty understanding him as well.    Exam is notable for very dry appearance, garbled speech, rhonchi on all anterior lung fields, saturating 88% on room air and requiring 3 L nasal cannula, bilateral lower extremity pitting edema with old and new wounds on the legs, and an abrasion on the forehead.     ED Course    Vitals: HRmax 115, O2 88% on RA, requiring 3L NC  Labs: WBC 13, plt 82, trops 550>538, lactate 2.3>2.9, BUN/Cr 39/1.26, gluc 64, , UA with RBC, granular casts, protein  Intervention: s/p zosyn, 2.5L IVF  Consults:   Images: CTH wnl; CXR with bl LL consolidations on wet read        (12 Oct 2024 21:33)      PAST MEDICAL & SURGICAL HISTORY:  HIV (human immunodeficiency virus infection)      Umbilical hernia      Essential hypertension      Cardiomegaly      Elevated PSA      Urge incontinence      PVD (peripheral vascular disease)      Shingles      Enlarged prostate      Unilateral inguinal hernia, without obstruction or gangrene, not specified as recurrent      S/P hernia repair  right inguinal - 2013      History of cystoscopy  with photovaporization, green light laser lithotripsy 2016      PVD (peripheral vascular disease)  bilateral leg stents 2020      S/P cataract surgery          PREVIOUS DIAGNOSTIC TESTING:      ECHO  FINDINGS:    STRESS  FINDINGS:    CATHETERIZATION  FINDINGS:    MEDICATIONS  (STANDING):  albuterol/ipratropium for Nebulization 3 milliLiter(s) Nebulizer every 6 hours  ascorbic acid 500 milliGRAM(s) Oral daily  cefTRIAXone   IVPB 2000 milliGRAM(s) IV Intermittent every 24 hours  folic acid 1 milliGRAM(s) Oral <User Schedule>  heparin   Injectable 5000 Unit(s) SubCutaneous every 12 hours  influenza  Vaccine (HIGH DOSE) 0.5 milliLiter(s) IntraMuscular once  metoprolol tartrate 25 milliGRAM(s) Oral two times a day  multivitamin 1 Tablet(s) Oral daily  pantoprazole    Tablet 40 milliGRAM(s) Oral before breakfast  rosuvastatin 20 milliGRAM(s) Oral at bedtime  sodium chloride 3%  Inhalation 4 milliLiter(s) Inhalation every 12 hours  tamsulosin 0.4 milliGRAM(s) Oral at bedtime  vancomycin  IVPB      vancomycin  IVPB 1000 milliGRAM(s) IV Intermittent every 12 hours    MEDICATIONS  (PRN):  aluminum hydroxide/magnesium hydroxide/simethicone Suspension 30 milliLiter(s) Oral every 4 hours PRN Dyspepsia  benzonatate 100 milliGRAM(s) Oral every 8 hours PRN Cough  guaiFENesin  milliGRAM(s) Oral every 12 hours PRN Cough  melatonin 3 milliGRAM(s) Oral at bedtime PRN Insomnia  ondansetron Injectable 4 milliGRAM(s) IV Push every 8 hours PRN Nausea and/or Vomiting      FAMILY HISTORY:  No pertinent family history in first degree relatives        SOCIAL HISTORY:    CIGARETTES:    ALCOHOL:    REVIEW OF SYSTEMS:  CONSTITUTIONAL: No fever, weight loss, or fatigue  EYES: No eye pain, visual disturbances, or discharge  ENMT:  No difficulty hearing, tinnitus, vertigo; No sinus or throat pain  NECK: No pain or stiffness  RESPIRATORY: No cough, wheezing, chills or hemoptysis; No shortness of breath  CARDIOVASCULAR: No chest pain, palpitations, dizziness, or leg swelling  GASTROINTESTINAL: No abdominal or epigastric pain. No nausea, vomiting, or hematemesis; No diarrhea or constipation. No melena or hematochezia.  GENITOURINARY: No dysuria, frequency, hematuria, or incontinence  NEUROLOGICAL: No headaches, memory loss, loss of strength, numbness, or tremors  SKIN: No itching, burning, rashes, or lesions   LYMPH NODES: No enlarged glands  ENDOCRINE: No heat or cold intolerance; No hair loss  MUSCULOSKELETAL: No joint pain or swelling; No muscle, back, or extremity pain  PSYCHIATRIC: No depression, anxiety, mood swings, or difficulty sleeping  HEME/LYMPH: No easy bruising, or bleeding gums  ALLERY AND IMMUNOLOGIC: No hives or eczema    Vital Signs Last 24 Hrs  T(C): 36.7 (16 Oct 2024 19:39), Max: 36.9 (16 Oct 2024 11:16)  T(F): 98.1 (16 Oct 2024 19:39), Max: 98.4 (16 Oct 2024 11:16)  HR: 98 (16 Oct 2024 19:39) (92 - 98)  BP: 129/77 (16 Oct 2024 19:39) (127/85 - 142/77)  BP(mean): --  RR: 18 (16 Oct 2024 19:39) (18 - 18)  SpO2: 100% (16 Oct 2024 19:39) (98% - 100%)    Parameters below as of 16 Oct 2024 19:39  Patient On (Oxygen Delivery Method): nasal cannula  O2 Flow (L/min): 2        PHYSICAL EXAM:  GENERAL: NAD, well-groomed, well-developed  HEAD:  Atraumatic, Normocephalic  EYES: EOMI, PERRLA, conjunctiva and sclera clear  ENMT: No tonsillar erythema, exudates, or enlargement; Moist mucous membranes, Good dentition, No lesions  NECK: Supple, No JVD, Normal thyroid  NERVOUS SYSTEM:  Alert & Oriented X3, Good concentration; Motor Strength 5/5 B/L upper and lower extremities; DTRs 2+ intact and symmetric  CHEST/LUNG: Clear to percussion bilaterally; No rales, rhonchi, wheezing, or rubs  HEART: Regular rate and rhythm; No murmurs, rubs, or gallops  ABDOMEN: Soft, Nontender, Nondistended; Bowel sounds present  EXTREMITIES:  2+ Peripheral Pulses, No clubbing, cyanosis, or edema  LYMPH: No lymphadenopathy noted  SKIN: No rashes or lesions      INTERPRETATION OF TELEMETRY:    [facility]    [facility]     LABS:                        11.9   8.52  )-----------( 72       ( 16 Oct 2024 06:44 )             35.9     10-16    145  |  115[H]  |  14  ----------------------------<  91  4.1   |  24  |  0.87    Ca    8.0[L]      16 Oct 2024 06:44  Phos  2.2     10-16  Mg     2.3     10-16    TPro  4.8[L]  /  Alb  1.8[L]  /  TBili  0.7  /  DBili  x   /  AST  61[H]  /  ALT  40  /  AlkPhos  79  10-16          Urinalysis Basic - ( 16 Oct 2024 06:44 )    Color: x / Appearance: x / SG: x / pH: x  Gluc: 91 mg/dL / Ketone: x  / Bili: x / Urobili: x   Blood: x / Protein: x / Nitrite: x   Leuk Esterase: x / RBC: x / WBC x   Sq Epi: x / Non Sq Epi: x / Bacteria: x      Lipid Panel:   I&O's Summary    15 Oct 2024 07:01  -  16 Oct 2024 07:00  --------------------------------------------------------  IN: 300 mL / OUT: 0 mL / NET: 300 mL        RADIOLOGY & ADDITIONAL STUDIES:

## 2024-10-16 NOTE — PROGRESS NOTE ADULT - PROBLEM SELECTOR PLAN 5
as above pharmacy verbalizes does not have HIV meds  viral load noted.  YOVANA Andres reccs: Hold his HIV meds for now

## 2024-10-16 NOTE — PROGRESS NOTE ADULT - PROBLEM SELECTOR PLAN 6
see plan as above s/p 's--->Digoxin 0.5mg IVP x1 given  patient hypotensive, likely from RVR- nifedipine held  elevated troponin likely ischemic demand ([phone])  TTE- ef 20-25%;  Severe left ventricular hypertrophy. Consider cardiac MRI to evaluate for possible infiltrative cardiomyopathy.  c/w metoprolol 25mg BID  continue telemetry- SR 97 (rate controlled )  f/u HEATHER  Cardiology Dr. Daniel

## 2024-10-16 NOTE — PROGRESS NOTE ADULT - SUBJECTIVE AND OBJECTIVE BOX
83 y/o male admitted s/p fall with sepsis, aspiration pneumonia, and rhabdo, vascular consulted for nonpalpable pulses dp/pt b/l. Pt endorses history of b/l stents placed in lower extremities 3-4 years ago   c/o Right lateral leg wound, left toe wounds, no signs of acute infection, compartments soft throughout   Pt s- new complaints today        Extremities: lle edema>rle, nonpalpable dp/pt bilaterally, fem palpable b/l. right lateral leg wound with fibrotic wound base. Small blisters dorsal to the PIPJ of L 2nd and 3rd digits. No purulence. No soft tissue crepitance. No fluctuance. No clinical signs of infection or ascending infection, compartments soft bilaterally, sensation in tact b/l  Skin:  Warm and dry   Musculoskeletal:  No calf tenderness or palpable cords      LABS:                                              11.9   8.52  )-----------( x        ( 16 Oct 2024 06:44 )             35.9   10-15    145  |  117[H]  |  18  ----------------------------<  106[H]  4.3   |  23  |  0.92    Ca    7.8[L]      15 Oct 2024 22:45  Phos  2.3     10-15  Mg     2.1     10-15

## 2024-10-16 NOTE — CONSULT NOTE ADULT - SUBJECTIVE AND OBJECTIVE BOX
NEUROLOGY CONSULT NOTE    NAME:  BABAK ROSENTHAL      ASSESSMENT:  84M with right-sided hemiparesis concerning for acute ischemic stroke in the setting of sepsis      RECOMMENDATIONS:      1. Stroke workup  - MRI Brain w/wo Gadolinium to evaluate for intracranial lesion  - Transthoracic Echocardiogram  - Telemetry monitoring while inpatient  - Hemoglobin A1c  - Fasting lipid panel    2. Secondary stroke prevention  - Q4H Neurochecks & Vital signs  - Confirm that patient has passed a bedside swallow evaluation before resuming PO meds  - Antiplatelets on hold since Plt count < 100K  - Continue Rosuvastatin 20mg PO QHS (may adjust dose to achieve goal LDL < 70 mg/dL)  - Treat BP if over 180/110 within first 24 hours of last seen normal; thereafter treat if over 140/90 (goal /80)  - Treatment of sepsis as per primary team  - PT/OT  - DVT ppx: SCDs, Heparin          NOTE TO BE COMPLETED - PLEASE REFER TO ABOVE ONLY AND IGNORE INFORMATION BELOW    *******************************      CHIEF COMPLAINT:  Patient is a 84y old  Male who presents with a chief complaint of sepsis (16 Oct 2024 09:49)      HPI:  84-year-old man from home with hypertension, PVD, BPH, HIV (on Genvoya??) coming with his daughter after she found him on the floor.  She had been trying to get in touch with him for 3 days but was unable so came up to [place] from [place], where she had recently moved, and found him on the floor covered in feces and urine.  He was confused at the time.  He does not recall details about how he got to the floor.  Per daughter, he is independent and mentate as well at baseline. On exam, he appears to be A&O x 3 but he is very difficult to understand.  He appears very dry and sounds like he is swallowing his tongue.  His daughter has difficulty understanding him as well.    Exam is notable for very dry appearance, garbled speech, rhonchi on all anterior lung fields, saturating 88% on room air and requiring 3 L nasal cannula, bilateral lower extremity pitting edema with old and new wounds on the legs, and an abrasion on the forehead.     ED Course    Vitals: HRmax 115, O2 88% on RA, requiring 3L NC  Labs: WBC 13, plt 82, trops 550>538, lactate 2.3>2.9, BUN/Cr 39/1.26, gluc 64, , UA with RBC, granular casts, protein  Intervention: s/p zosyn, 2.5L IVF  Consults:   Images: CTH wnl; CXR with bl LL consolidations on wet read        (12 Oct 2024 21:33)      NEURO HPI:      PAST MEDICAL & SURGICAL HISTORY:  HIV (human immunodeficiency virus infection)      Umbilical hernia      Essential hypertension      Cardiomegaly      Elevated PSA      Urge incontinence      PVD (peripheral vascular disease)      Shingles      Enlarged prostate      Unilateral inguinal hernia, without obstruction or gangrene, not specified as recurrent      S/P hernia repair  right inguinal - 2013      History of cystoscopy  with photovaporization, green light laser lithotripsy 2016      PVD (peripheral vascular disease)  bilateral leg stents 2020      S/P cataract surgery          MEDICATIONS:  albuterol/ipratropium for Nebulization 3 milliLiter(s) Nebulizer every 6 hours  aluminum hydroxide/magnesium hydroxide/simethicone Suspension 30 milliLiter(s) Oral every 4 hours PRN  ascorbic acid 500 milliGRAM(s) Oral daily  benzonatate 100 milliGRAM(s) Oral every 8 hours PRN  cefTRIAXone   IVPB 2000 milliGRAM(s) IV Intermittent every 24 hours  folic acid 1 milliGRAM(s) Oral <User Schedule>  guaiFENesin  milliGRAM(s) Oral every 12 hours PRN  heparin   Injectable 5000 Unit(s) SubCutaneous every 12 hours  influenza  Vaccine (HIGH DOSE) 0.5 milliLiter(s) IntraMuscular once  melatonin 3 milliGRAM(s) Oral at bedtime PRN  metoprolol tartrate 25 milliGRAM(s) Oral two times a day  multivitamin 1 Tablet(s) Oral daily  ondansetron Injectable 4 milliGRAM(s) IV Push every 8 hours PRN  pantoprazole    Tablet 40 milliGRAM(s) Oral before breakfast  rosuvastatin 20 milliGRAM(s) Oral at bedtime  sodium chloride 3%  Inhalation 4 milliLiter(s) Inhalation every 12 hours  tamsulosin 0.4 milliGRAM(s) Oral at bedtime  vancomycin  IVPB      vancomycin  IVPB 1000 milliGRAM(s) IV Intermittent every 12 hours      ALLERGIES:  No Known Allergies      FAMILY HISTORY:  No pertinent family history in first degree relatives          SOCIAL HISTORY:  Denies alcohol, tobacco, or illicit drug use      REVIEW OF SYSTEMS:  GENERAL: No fever, weight changes, fatigue  EYES: No eye pain or discharge  EAR/NOSE/MOUTH/THROAT: No sinus or throat pain; No difficulty hearing  NECK: No pain or stiffness  RESPIRATORY: No cough, wheezing, chills, or hemoptysis  CARDIOVASCULAR: No chest pain, palpitations, shortness of breath, or dyspnea on exertion  GASTROINTESTINAL: No abdominal pain, nausea, vomiting, hematemesis, diarrhea, or constipation  GENITOURINARY: No dysuria, frequency, hematuria, or incontinence  SKIN: No rashes or lesions  ENDOCRINE: No heat or cold intolerance  HEMATOLOGIC: No easy bruising or bleeding  PSYCHIATRIC: No depression, anxiety, or mood swings  MUSCULOSKELETAL: No joint pain or swelling  NEUROLOGICAL: As per HPI          OBJECTIVE:    Vital Signs Last 24 Hrs  T(C): 36.8 (16 Oct 2024 23:56), Max: 36.9 (16 Oct 2024 11:16)  T(F): 98.2 (16 Oct 2024 23:56), Max: 98.4 (16 Oct 2024 11:16)  HR: 63 (16 Oct 2024 23:56) (63 - 100)  BP: 136/68 (16 Oct 2024 23:56) (97/78 - 142/77)  BP(mean): --  RR: 18 (16 Oct 2024 23:56) (18 - 18)  SpO2: 99% (16 Oct 2024 23:56) (98% - 100%)    Parameters below as of 16 Oct 2024 23:56  Patient On (Oxygen Delivery Method): nasal cannula  O2 Flow (L/min): 2      General Examination:  General: No acute distress  HEENT: Atraumatic, Normocephalic  Respiratory: CTA B/l.  No crackles, rhonchi, or wheezes.  Cardiovascular: RRR.  Normal S1 & S2.  Normal b/l radial and pedal pulses.    Neurological Examination:  General / Mental Status: AAO x 3.  No aphasia or dysarthria.  Naming and repetition intact.  Cranial Nerves: VFF x 4.  PERRL.  EOMI x 2, No nystagmus or diplopia.  B/l V1-V3 equal and intact to light touch and pinprick.  Symmetric facial movement and palate elevation.  B/l hearing equal to finger rub.  5/5 strength with b/l sternocleidomastoid and trapezius.  Midline tongue protrusion, with no atrophy or fasciculations.  Motor: Normal bulk & tone in all four extremities.  5/5 strength throughout all four extremities.  No downward drift, rigidity, spasticity, or tremors in any of the four extremities.  Sensory: Intact to light touch and pinprick in all four extremities.  Negative Romberg.  Reflex: 2+ and symmetric at b/l biceps, triceps, brachioradialis, patellae, and ankles.  Downgoing toes b/l.  Coordination: No dysmetria with b/l finger-to-nose and heel raise tests.  Symmetric rapid alternating movements b/l.  Gait: Normal, narrow-based gait.  No difficulty with tiptoe, heel, and tandem gaits.        LABORATORY VALUES:                        11.9   8.52  )-----------( 72       ( 16 Oct 2024 06:44 )             35.9       10-16    145  |  115[H]  |  14  ----------------------------<  91  4.1   |  24  |  0.87    Ca    8.0[L]      16 Oct 2024 06:44  Phos  2.2     10-16  Mg     2.3     10-16    TPro  4.8[L]  /  Alb  1.8[L]  /  TBili  0.7  /  DBili  x   /  AST  61[H]  /  ALT  40  /  AlkPhos  79  10-16    Folate, Serum: <2.0 ng/mL (10-14-24 @ 06:40)  Vitamin B12, Serum: >2000 pg/mL (10-14-24 @ 06:40)    Thyroid Stimulating Hormone, Serum: 2.25 uU/mL (10.14.24 @ 06:40)           NEUROIMAGING:          Please contact the Neurology consult service with any neurological questions.    Rudy More MD   of Neurology  [facility] at [facility] NEUROLOGY CONSULT NOTE    NAME:  BABAK ROSENTHAL      ASSESSMENT:  84M with right-sided hemiparesis concerning for acute ischemic stroke in the setting of sepsis      RECOMMENDATIONS:      1. Stroke workup  - MRI Brain w/wo Gadolinium to evaluate for intracranial lesion  - Transthoracic Echocardiogram  - Telemetry monitoring while inpatient  - Hemoglobin A1c  - Fasting lipid panel    2. Secondary stroke prevention  - Q4H Neurochecks & Vital signs  - Confirm that patient has passed a bedside swallow evaluation before resuming PO meds  - Antiplatelets on hold since Plt count < 100K  - Continue Rosuvastatin 20mg PO QHS (may adjust dose to achieve goal LDL < 70 mg/dL)  - Treat BP if over 180/110 within first 24 hours of last seen normal; thereafter treat if over 140/90 (goal /80)  - Treatment of sepsis as per primary team  - PT/OT  - DVT ppx: SCDs, Heparin          *******************************      CHIEF COMPLAINT:  Patient is a 84y old  Male who presents with a chief complaint of sepsis (16 Oct 2024 09:49)      HPI:  84-year-old man from home with hypertension, PVD, BPH, HIV (on Genvoya??) coming with his daughter after she found him on the floor.  She had been trying to get in touch with him for 3 days but was unable so came up to [place] from [place], where she had recently moved, and found him on the floor covered in feces and urine.  He was confused at the time.  He does not recall details about how he got to the floor.  Per daughter, he is independent and mentate as well at baseline. On exam, he appears to be A&O x 3 but he is very difficult to understand.  He appears very dry and sounds like he is swallowing his tongue.  His daughter has difficulty understanding him as well.  Exam is notable for very dry appearance, garbled speech, rhonchi on all anterior lung fields, saturating 88% on room air and requiring 3 L nasal cannula, bilateral lower extremity pitting edema with old and new wounds on the legs, and an abrasion on the forehead.   ED Course  Vitals: HRmax 115, O2 88% on RA, requiring 3L NC  Labs: WBC 13, plt 82, trops 550>538, lactate 2.3>2.9, BUN/Cr 39/1.26, gluc 64, , UA with RBC, granular casts, protein  Intervention: s/p zosyn, 2.5L IVF  Consults:   Images: CTH wnl; CXR with bl LL consolidations on wet read  (12 Oct 2024 21:33)      NEURO HPI:  84 RHM presenting after being found down, and being treated for sepsis. While admitted, he was found to have new weakness in the right arm      PAST MEDICAL & SURGICAL HISTORY:  HIV (human immunodeficiency virus infection)  Umbilical hernia  Essential hypertension  Cardiomegaly  Elevated PSA  Urge incontinence  PVD (peripheral vascular disease)  Shingles  Enlarged prostate  Unilateral inguinal hernia, without obstruction or gangrene, not specified as recurrent  S/P hernia repair,   right inguinal - 2013  History of cystoscopy,   with photovaporization, green light laser lithotripsy 2016  PVD (peripheral vascular disease),   bilateral leg stents 2020  S/P cataract surgery      MEDICATIONS:  albuterol/ipratropium for Nebulization 3 milliLiter(s) Nebulizer every 6 hours  aluminum hydroxide/magnesium hydroxide/simethicone Suspension 30 milliLiter(s) Oral every 4 hours PRN  ascorbic acid 500 milliGRAM(s) Oral daily  benzonatate 100 milliGRAM(s) Oral every 8 hours PRN  cefTRIAXone   IVPB 2000 milliGRAM(s) IV Intermittent every 24 hours  folic acid 1 milliGRAM(s) Oral <User Schedule>  guaiFENesin  milliGRAM(s) Oral every 12 hours PRN  heparin   Injectable 5000 Unit(s) SubCutaneous every 12 hours  influenza  Vaccine (HIGH DOSE) 0.5 milliLiter(s) IntraMuscular once  melatonin 3 milliGRAM(s) Oral at bedtime PRN  metoprolol tartrate 25 milliGRAM(s) Oral two times a day  multivitamin 1 Tablet(s) Oral daily  ondansetron Injectable 4 milliGRAM(s) IV Push every 8 hours PRN  pantoprazole    Tablet 40 milliGRAM(s) Oral before breakfast  rosuvastatin 20 milliGRAM(s) Oral at bedtime  sodium chloride 3%  Inhalation 4 milliLiter(s) Inhalation every 12 hours  tamsulosin 0.4 milliGRAM(s) Oral at bedtime  vancomycin  IVPB      vancomycin  IVPB 1000 milliGRAM(s) IV Intermittent every 12 hours      ALLERGIES:  No Known Allergies      FAMILY HISTORY:  No pertinent family history in first degree relatives          SOCIAL HISTORY:  Denies alcohol, tobacco, or illicit drug use      REVIEW OF SYSTEMS:  GENERAL: No fever, weight changes, fatigue  EYES: No eye pain or discharge  EAR/NOSE/MOUTH/THROAT: No sinus or throat pain; No difficulty hearing  NECK: No pain or stiffness  RESPIRATORY: No cough, wheezing, chills, or hemoptysis  CARDIOVASCULAR: No chest pain, palpitations, shortness of breath, or dyspnea on exertion  GASTROINTESTINAL: No abdominal pain, nausea, vomiting, hematemesis, diarrhea, or constipation  GENITOURINARY: No dysuria, frequency, hematuria, or incontinence  SKIN: No rashes or lesions  ENDOCRINE: No heat or cold intolerance  HEMATOLOGIC: No easy bruising or bleeding  PSYCHIATRIC: No depression, anxiety, or mood swings  MUSCULOSKELETAL: No joint pain or swelling  NEUROLOGICAL: As per HPI          OBJECTIVE:    Vital Signs Last 24 Hrs  T(C): 36.8 (16 Oct 2024 23:56), Max: 36.9 (16 Oct 2024 11:16)  T(F): 98.2 (16 Oct 2024 23:56), Max: 98.4 (16 Oct 2024 11:16)  HR: 63 (16 Oct 2024 23:56) (63 - 100)  BP: 136/68 (16 Oct 2024 23:56) (97/78 - 142/77)  BP(mean): --  RR: 18 (16 Oct 2024 23:56) (18 - 18)  SpO2: 99% (16 Oct 2024 23:56) (98% - 100%)    Parameters below as of 16 Oct 2024 23:56  Patient On (Oxygen Delivery Method): nasal cannula  O2 Flow (L/min): 2      General Examination:  General: No acute distress  HEENT: Atraumatic, Normocephalic  Respiratory: CTA B/l.  No crackles, rhonchi, or wheezes.  Cardiovascular: RRR.  Normal S1 & S2.  Normal b/l radial and pedal pulses.    Neurological Examination:  General / Mental Status: AAO x 3.  No aphasia or dysarthria.  Naming and repetition intact.  Cranial Nerves: VFF x 4.  PERRL.  EOMI x 2, No nystagmus or diplopia.  B/l V1-V3 equal and intact to light touch and pinprick.  Symmetric facial movement and palate elevation.  B/l hearing equal to finger rub.  5/5 strength with b/l sternocleidomastoid and trapezius.  Midline tongue protrusion, with no atrophy or fasciculations.  Motor: Normal bulk & tone in all four extremities.  5/5 strength throughout all four extremities.  No downward drift, rigidity, spasticity, or tremors in any of the four extremities.  Sensory: Intact to light touch and pinprick in all four extremities.  Negative Romberg.  Reflex: 2+ and symmetric at b/l biceps, triceps, brachioradialis, patellae, and ankles.  Downgoing toes b/l.  Coordination: No dysmetria with b/l finger-to-nose and heel raise tests.  Symmetric rapid alternating movements b/l.  Gait: Normal, narrow-based gait.  No difficulty with tiptoe, heel, and tandem gaits.        LABORATORY VALUES:                        11.9   8.52  )-----------( 72       ( 16 Oct 2024 06:44 )             35.9       10-16    145  |  115[H]  |  14  ----------------------------<  91  4.1   |  24  |  0.87    Ca    8.0[L]      16 Oct 2024 06:44  Phos  2.2     10-16  Mg     2.3     10-16    TPro  4.8[L]  /  Alb  1.8[L]  /  TBili  0.7  /  DBili  x   /  AST  61[H]  /  ALT  40  /  AlkPhos  79  10-16    Folate, Serum: <2.0 ng/mL (10-14-24 @ 06:40)  Vitamin B12, Serum: >2000 pg/mL (10-14-24 @ 06:40)    Thyroid Stimulating Hormone, Serum: 2.25 uU/mL (10.14.24 @ 06:40)           NEUROIMAGING:          Please contact the Neurology consult service with any neurological questions.    Rudy More MD   of Neurology  [facility] [employer] [facility] at [facility]

## 2024-10-16 NOTE — PROGRESS NOTE ADULT - PROBLEM SELECTOR PLAN 3
secondary to pneumonia, Aerococcus viridans, Aerococcus urinae bacteremia, multiple wounds  -UA, MRSA PCR negative  -CXR with bilateral infiltrates   continue ceftriaxone and vancomycin  repeat blood culture negative  f/u TTE  f/u Vanco trough in 10/16 11:00pm (23:00)  f/u doppler, CRISTIAN  f/u right foot x-ray  f/u speech and swallow evaluation  Vascular consulted  Podiatry consulted  ID Dr Andres as above

## 2024-10-16 NOTE — PROGRESS NOTE ADULT - PROBLEM SELECTOR PLAN 11
f/u TTE  f/u Vanco trough in 10/16 11:00pm (23:00)  f/u doppler, CRISTIAN  f/u right foot x-ray  f/u speech and swallow evaluation  pending PT- daughter agrees to send patient to [facility] DVT PPX: heparin  GI PPX: PPI

## 2024-10-16 NOTE — PROGRESS NOTE ADULT - ASSESSMENT
84-year-old man from home with hypertension, PVD, BPH, HIV. Patient baseline A+Ox3. Patient present to ED lethargic after being found by daughter on floor covered in feces and urine. Patient admitted telemetry for sepsis, afib with  RVR Acute hypoxic respiratory failure secondary to pneumonia. CT head no acute hemorrhage or infarct, with microvascular changes and volume loss. Chest x-ray with moderate left loculated effusion. Initial blood culture resulting Staphylococcus epidermidis-likely contaminate, Aerococcus viridans, Aerococcus urinae, Isolates of Aerococcus spp. are predictably susceptible to penicillin, ampicillin, and vancomycin. All isolates are resistant to sulfonamides. Repeat blood culture negative. ID consulted. Patient treated with Rocephin and Vancomycin. Patient with multiple wounds, Wound care consulted. Podiatry consulted.       84-year-old man from home with hypertension, PVD, BPH, HIV. Patient baseline A+Ox3. Patient present to ED lethargic after being found by daughter on floor covered in feces and urine. Patient admitted telemetry for sepsis, afib with RVR & Acute hypoxic respiratory failure secondary to pneumonia. CT head no acute hemorrhage or infarct, with microvascular changes and volume loss. Chest x-ray with moderate left loculated effusion. Initial blood culture resulting Staphylococcus epidermidis-likely contaminate, Aerococcus viridans, Aerococcus urinae. Repeat blood culture negative. ID consulted. Patient treated with Rocephin and Vancomycin. Patient with multiple wounds, Wound care consulted. Podiatry consulted.

## 2024-10-16 NOTE — PROGRESS NOTE ADULT - PROBLEM SELECTOR PLAN 9
pharmacy verbalizes does not have HIV meds  patient is undetected likely in the setting of sepsis and HIV  Plt count 82-->57-->72  No signs of active bleeding  monitor CBC

## 2024-10-16 NOTE — SWALLOW BEDSIDE ASSESSMENT ADULT - SLP PERTINENT HISTORY OF CURRENT PROBLEM
84-year-old man from home with hypertension, PVD, BPH, HIV. Patient baseline A+Ox3. Patient present to ED lethargic after being found by daughter on floor covered in feces and urine. Patient admitted telemetry for sepsis, afib with  RVR Acute hypoxic respiratory failure secondary to pneumonia. CT head no acute hemorrhage or infarct, with microvascular changes and volume loss. Chest x-ray with moderate left loculated effusion. Patient with multiple wounds, Wound care consulted.

## 2024-10-17 DIAGNOSIS — A41.89 OTHER SPECIFIED SEPSIS: ICD-10-CM

## 2024-10-17 DIAGNOSIS — I24.89 OTHER FORMS OF ACUTE ISCHEMIC HEART DISEASE: ICD-10-CM

## 2024-10-17 DIAGNOSIS — G81.91 HEMIPLEGIA, UNSPECIFIED AFFECTING RIGHT DOMINANT SIDE: ICD-10-CM

## 2024-10-17 LAB
ALBUMIN SERPL ELPH-MCNC: 1.7 G/DL — LOW (ref 3.5–5)
ALP SERPL-CCNC: 83 U/L — SIGNIFICANT CHANGE UP (ref 40–120)
ALT FLD-CCNC: 42 U/L DA — SIGNIFICANT CHANGE UP (ref 10–60)
ANION GAP SERPL CALC-SCNC: 5 MMOL/L — SIGNIFICANT CHANGE UP (ref 5–17)
AST SERPL-CCNC: 58 U/L — HIGH (ref 10–40)
BILIRUB SERPL-MCNC: 0.6 MG/DL — SIGNIFICANT CHANGE UP (ref 0.2–1.2)
BUN SERPL-MCNC: 15 MG/DL — SIGNIFICANT CHANGE UP (ref 7–18)
CALCIUM SERPL-MCNC: 7.8 MG/DL — LOW (ref 8.4–10.5)
CHLORIDE SERPL-SCNC: 114 MMOL/L — HIGH (ref 96–108)
CO2 SERPL-SCNC: 26 MMOL/L — SIGNIFICANT CHANGE UP (ref 22–31)
CREAT SERPL-MCNC: 0.98 MG/DL — SIGNIFICANT CHANGE UP (ref 0.5–1.3)
EGFR: 76 ML/MIN/1.73M2 — SIGNIFICANT CHANGE UP
GLUCOSE SERPL-MCNC: 83 MG/DL — SIGNIFICANT CHANGE UP (ref 70–99)
HCT VFR BLD CALC: 34.9 % — LOW (ref 39–50)
HGB BLD-MCNC: 11.6 G/DL — LOW (ref 13–17)
MAGNESIUM SERPL-MCNC: 2.1 MG/DL — SIGNIFICANT CHANGE UP (ref 1.6–2.6)
MCHC RBC-ENTMCNC: 32.1 PG — SIGNIFICANT CHANGE UP (ref 27–34)
MCHC RBC-ENTMCNC: 33.2 GM/DL — SIGNIFICANT CHANGE UP (ref 32–36)
MCV RBC AUTO: 96.7 FL — SIGNIFICANT CHANGE UP (ref 80–100)
NRBC # BLD: 0 /100 WBCS — SIGNIFICANT CHANGE UP (ref 0–0)
PHOSPHATE SERPL-MCNC: 2.5 MG/DL — SIGNIFICANT CHANGE UP (ref 2.5–4.5)
PLATELET # BLD AUTO: 82 K/UL — LOW (ref 150–400)
POTASSIUM SERPL-MCNC: 3.6 MMOL/L — SIGNIFICANT CHANGE UP (ref 3.5–5.3)
POTASSIUM SERPL-SCNC: 3.6 MMOL/L — SIGNIFICANT CHANGE UP (ref 3.5–5.3)
PROT SERPL-MCNC: 4.8 G/DL — LOW (ref 6–8.3)
RBC # BLD: 3.61 M/UL — LOW (ref 4.2–5.8)
RBC # FLD: 14.3 % — SIGNIFICANT CHANGE UP (ref 10.3–14.5)
SODIUM SERPL-SCNC: 145 MMOL/L — SIGNIFICANT CHANGE UP (ref 135–145)
VANCOMYCIN TROUGH SERPL-MCNC: 18.6 UG/ML — SIGNIFICANT CHANGE UP (ref 10–20)
VANCOMYCIN TROUGH SERPL-MCNC: 25.2 UG/ML — CRITICAL HIGH (ref 10–20)
WBC # BLD: 7.58 K/UL — SIGNIFICANT CHANGE UP (ref 3.8–10.5)
WBC # FLD AUTO: 7.58 K/UL — SIGNIFICANT CHANGE UP (ref 3.8–10.5)

## 2024-10-17 PROCEDURE — 71045 X-RAY EXAM CHEST 1 VIEW: CPT | Mod: 26

## 2024-10-17 PROCEDURE — 99233 SBSQ HOSP IP/OBS HIGH 50: CPT

## 2024-10-17 PROCEDURE — 99232 SBSQ HOSP IP/OBS MODERATE 35: CPT

## 2024-10-17 PROCEDURE — 70553 MRI BRAIN STEM W/O & W/DYE: CPT | Mod: 26

## 2024-10-17 RX ORDER — FUROSEMIDE 40 MG
20 TABLET ORAL ONCE
Refills: 0 | Status: COMPLETED | OUTPATIENT
Start: 2024-10-17 | End: 2024-10-17

## 2024-10-17 RX ORDER — VANCOMYCIN HYDROCHLORIDE 50 MG/ML
1000 KIT ORAL EVERY 24 HOURS
Refills: 0 | Status: DISCONTINUED | OUTPATIENT
Start: 2024-10-17 | End: 2024-10-22

## 2024-10-17 RX ADMIN — Medication 25 MILLIGRAM(S): at 17:29

## 2024-10-17 RX ADMIN — VANCOMYCIN HYDROCHLORIDE 250 MILLIGRAM(S): KIT at 13:27

## 2024-10-17 RX ADMIN — SODIUM CHLORIDE 4 MILLILITER(S): 9 INJECTION, SOLUTION INTRAMUSCULAR; INTRAVENOUS; SUBCUTANEOUS at 20:02

## 2024-10-17 RX ADMIN — SODIUM CHLORIDE 4 MILLILITER(S): 9 INJECTION, SOLUTION INTRAMUSCULAR; INTRAVENOUS; SUBCUTANEOUS at 08:43

## 2024-10-17 RX ADMIN — IPRATROPIUM BROMIDE AND ALBUTEROL SULFATE 3 MILLILITER(S): .5; 2.5 SOLUTION RESPIRATORY (INHALATION) at 15:05

## 2024-10-17 RX ADMIN — FOLIC ACID 1 MILLIGRAM(S): 1 TABLET ORAL at 08:53

## 2024-10-17 RX ADMIN — HEPARIN SODIUM 5000 UNIT(S): 10000 INJECTION INTRAVENOUS; SUBCUTANEOUS at 17:29

## 2024-10-17 RX ADMIN — Medication 25 MILLIGRAM(S): at 06:25

## 2024-10-17 RX ADMIN — IPRATROPIUM BROMIDE AND ALBUTEROL SULFATE 3 MILLILITER(S): .5; 2.5 SOLUTION RESPIRATORY (INHALATION) at 20:02

## 2024-10-17 RX ADMIN — IPRATROPIUM BROMIDE AND ALBUTEROL SULFATE 3 MILLILITER(S): .5; 2.5 SOLUTION RESPIRATORY (INHALATION) at 03:37

## 2024-10-17 RX ADMIN — HEPARIN SODIUM 5000 UNIT(S): 10000 INJECTION INTRAVENOUS; SUBCUTANEOUS at 06:25

## 2024-10-17 RX ADMIN — IPRATROPIUM BROMIDE AND ALBUTEROL SULFATE 3 MILLILITER(S): .5; 2.5 SOLUTION RESPIRATORY (INHALATION) at 08:43

## 2024-10-17 RX ADMIN — Medication 0.4 MILLIGRAM(S): at 22:07

## 2024-10-17 RX ADMIN — PANTOPRAZOLE SODIUM 40 MILLIGRAM(S): 40 TABLET, DELAYED RELEASE ORAL at 06:25

## 2024-10-17 RX ADMIN — Medication 20 MILLIGRAM(S): at 22:07

## 2024-10-17 RX ADMIN — Medication 20 MILLIGRAM(S): at 17:29

## 2024-10-17 RX ADMIN — FOLIC ACID 1 MILLIGRAM(S): 1 TABLET ORAL at 17:29

## 2024-10-17 RX ADMIN — Medication 100 MILLIGRAM(S): at 02:34

## 2024-10-17 NOTE — PROGRESS NOTE ADULT - SUBJECTIVE AND OBJECTIVE BOX
PATIENT SEEN AND EXAMINED BY ADEN ZHANG M.D. ON :- 10/17/24  DATE OF SERVICE:  10/17/24           Interim events noted,Labs ,Radiological studies and Cardiology tests reviewed .    Patient is a 84y old  Male who presents with a chief complaint of sepsis (17 Oct 2024 15:44)      HPI:  84-year-old man from home with hypertension, PVD, BPH, HIV (on [place]??) coming with his daughter after she found him on the floor.  She had been trying to get in touch with him for 3 days but was unable so came up to [place] from [place], where she had recently moved, and found him on the floor covered in feces and urine.  He was confused at the time.  He does not recall details about how he got to the floor.  Per daughter, he is independent and mentate as well at baseline. On exam, he appears to be A&O x 3 but he is very difficult to understand.  He appears very dry and sounds like he is swallowing his tongue.  His daughter has difficulty understanding him as well.    Exam is notable for very dry appearance, garbled speech, rhonchi on all anterior lung fields, saturating 88% on room air and requiring 3 L nasal cannula, bilateral lower extremity pitting edema with old and new wounds on the legs, and an abrasion on the forehead.     ED Course    Vitals: HRmax 115, O2 88% on RA, requiring 3L NC  Labs: WBC 13, plt 82, trops 550>538, lactate 2.3>2.9, BUN/Cr 39/1.26, gluc 64, , UA with RBC, granular casts, protein  Intervention: s/p zosyn, 2.5L IVF  Consults:   Images: CTH wnl; CXR with bl LL consolidations on wet read        (12 Oct 2024 21:33)      PAST MEDICAL & SURGICAL HISTORY:  HIV (human immunodeficiency virus infection)      Umbilical hernia      Essential hypertension      Cardiomegaly      Elevated PSA      Urge incontinence      PVD (peripheral vascular disease)      Shingles      Enlarged prostate      Unilateral inguinal hernia, without obstruction or gangrene, not specified as recurrent      S/P hernia repair  right inguinal - 2013      History of cystoscopy  with photovaporization, green light laser lithotripsy 2016      PVD (peripheral vascular disease)  bilateral leg stents 2020      S/P cataract surgery          PREVIOUS DIAGNOSTIC TESTING:      ECHO  FINDINGS:    STRESS  FINDINGS:    CATHETERIZATION  FINDINGS:    MEDICATIONS  (STANDING):  albuterol/ipratropium for Nebulization 3 milliLiter(s) Nebulizer every 6 hours  ascorbic acid 500 milliGRAM(s) Oral daily  cefTRIAXone   IVPB 2000 milliGRAM(s) IV Intermittent every 24 hours  folic acid 1 milliGRAM(s) Oral <User Schedule>  heparin   Injectable 5000 Unit(s) SubCutaneous every 12 hours  influenza  Vaccine (HIGH DOSE) 0.5 milliLiter(s) IntraMuscular once  metoprolol tartrate 25 milliGRAM(s) Oral two times a day  multivitamin 1 Tablet(s) Oral daily  pantoprazole    Tablet 40 milliGRAM(s) Oral before breakfast  rosuvastatin 20 milliGRAM(s) Oral at bedtime  sodium chloride 3%  Inhalation 4 milliLiter(s) Inhalation every 12 hours  tamsulosin 0.4 milliGRAM(s) Oral at bedtime  vancomycin  IVPB 1000 milliGRAM(s) IV Intermittent every 24 hours    MEDICATIONS  (PRN):  aluminum hydroxide/magnesium hydroxide/simethicone Suspension 30 milliLiter(s) Oral every 4 hours PRN Dyspepsia  benzonatate 100 milliGRAM(s) Oral every 8 hours PRN Cough  guaiFENesin  milliGRAM(s) Oral every 12 hours PRN Cough  melatonin 3 milliGRAM(s) Oral at bedtime PRN Insomnia  ondansetron Injectable 4 milliGRAM(s) IV Push every 8 hours PRN Nausea and/or Vomiting      FAMILY HISTORY:  No pertinent family history in first degree relatives        SOCIAL HISTORY:    CIGARETTES:    ALCOHOL:    REVIEW OF SYSTEMS:  CONSTITUTIONAL: No fever, weight loss, or fatigue  EYES: No eye pain, visual disturbances, or discharge  ENMT:  No difficulty hearing, tinnitus, vertigo; No sinus or throat pain  NECK: No pain or stiffness  RESPIRATORY: No cough, wheezing, chills or hemoptysis; No shortness of breath  CARDIOVASCULAR: No chest pain, palpitations, dizziness, or leg swelling  GASTROINTESTINAL: No abdominal or epigastric pain. No nausea, vomiting, or hematemesis; No diarrhea or constipation. No melena or hematochezia.  GENITOURINARY: No dysuria, frequency, hematuria, or incontinence  NEUROLOGICAL: No headaches, memory loss, loss of strength, numbness, or tremors  SKIN: No itching, burning, rashes, or lesions   LYMPH NODES: No enlarged glands  ENDOCRINE: No heat or cold intolerance; No hair loss  MUSCULOSKELETAL: No joint pain or swelling; No muscle, back, or extremity pain  PSYCHIATRIC: No depression, anxiety, mood swings, or difficulty sleeping  HEME/LYMPH: No easy bruising, or bleeding gums  ALLERY AND IMMUNOLOGIC: No hives or eczema    Vital Signs Last 24 Hrs  T(C): 37 (17 Oct 2024 19:49), Max: 37 (17 Oct 2024 11:11)  T(F): 98.6 (17 Oct 2024 19:49), Max: 98.6 (17 Oct 2024 11:11)  HR: 98 (17 Oct 2024 19:49) (63 - 102)  BP: 124/67 (17 Oct 2024 19:49) (97/78 - 136/68)  BP(mean): --  RR: 18 (17 Oct 2024 19:49) (18 - 18)  SpO2: 94% (17 Oct 2024 19:49) (94% - 100%)    Parameters below as of 17 Oct 2024 19:49  Patient On (Oxygen Delivery Method): room air          PHYSICAL EXAM:  GENERAL: NAD, well-groomed, well-developed  HEAD:  Atraumatic, Normocephalic  EYES: EOMI, PERRLA, conjunctiva and sclera clear  ENMT: No tonsillar erythema, exudates, or enlargement; Moist mucous membranes, Good dentition, No lesions  NECK: Supple, No JVD, Normal thyroid  NERVOUS SYSTEM:  Alert & Oriented X3, Good concentration; Motor Strength 5/5 B/L upper and lower extremities; DTRs 2+ intact and symmetric  CHEST/LUNG: Clear to percussion bilaterally; No rales, rhonchi, wheezing, or rubs  HEART: Regular rate and rhythm; No murmurs, rubs, or gallops  ABDOMEN: Soft, Nontender, Nondistended; Bowel sounds present  EXTREMITIES:  2+ Peripheral Pulses, No clubbing, cyanosis, or edema  LYMPH: No lymphadenopathy noted  SKIN: No rashes or lesions      INTERPRETATION OF TELEMETRY:    [facility]    [facility]     LABS:                        11.6   7.58  )-----------( 82       ( 17 Oct 2024 07:16 )             34.9     10-17    145  |  114[H]  |  15  ----------------------------<  83  3.6   |  26  |  0.98    Ca    7.8[L]      17 Oct 2024 07:16  Phos  2.5     10-17  Mg     2.1     10-17    TPro  4.8[L]  /  Alb  1.7[L]  /  TBili  0.6  /  DBili  x   /  AST  58[H]  /  ALT  42  /  AlkPhos  83  10-17          Urinalysis Basic - ( 17 Oct 2024 07:16 )    Color: x / Appearance: x / SG: x / pH: x  Gluc: 83 mg/dL / Ketone: x  / Bili: x / Urobili: x   Blood: x / Protein: x / Nitrite: x   Leuk Esterase: x / RBC: x / WBC x   Sq Epi: x / Non Sq Epi: x / Bacteria: x      Lipid Panel:   I&O's Summary    17 Oct 2024 07:01  -  17 Oct 2024 23:14  --------------------------------------------------------  IN: 450 mL / OUT: 0 mL / NET: 450 mL        RADIOLOGY & ADDITIONAL STUDIES:       CONCLUSIONS:      1. No echocardiographic evidence of vegetations.   2. Left ventricular systolic function is severely decreased with an ejection fraction visually estimated at 25 to 30 %.   3. Moderate mitral regurgitation.   4. Mild aortic regurgitation.   5. Moderate tricuspid regurgitation.   6. Trace pulmonic regurgitation.   7. Estimated pulmonary artery systolic pressure is 59 mmHg, consistent with moderatepulmonary hypertension.   8. No pericardial effusion seen.   9. Left pleural effusion noted.  10. Mild atheroma in the visualized portions of the descending aorta.  11. Compared to the transthoracic echocardiogram performed on 10/15/2024, a HEATHER was performed and additional information provided.

## 2024-10-17 NOTE — PROGRESS NOTE ADULT - PROBLEM SELECTOR PLAN 7
- elevated troponin likely ischemic demand -- downtrended  - TTE: EF 20-25%; Global LV hypokinesis. Severe left ventricular hypertrophy.  - c/w metoprolol 25mg BID  - Cardiology Dr. Daniel

## 2024-10-17 NOTE — CHART NOTE - NSCHARTNOTEFT_GEN_A_CORE
EVENT: Vancomycin Level, Trough: 25.2: ug/mL (10.17.24 @ 00:41)    BRIEF HPI: 84-year-old man from home with hypertension, PVD, BPH, HIV. Patient baseline A+Ox3. Patient present to ED lethargic after being found by daughter on floor covered in feces and urine. Patient admitted telemetry for sepsis, Afib with RVR & Acute hypoxic respiratory failure secondary to pneumonia. CT head no acute hemorrhage or infarct, with microvascular changes and volume loss. Chest x-ray with moderate left loculated effusion. Initial blood culture resulting Staphylococcus epidermidis-likely contaminate, Aerococcus viridans, Aerococcus urinae. Repeat blood culture negative. ID Dr Andres. Pt on Rocephin and Vancomycin. Patient with multiple wounds. F/u wound care, podiatry consults.     OBJECTIVE:  Vital Signs Last 24 Hrs  T(C): 36.8 (16 Oct 2024 23:56), Max: 36.9 (16 Oct 2024 11:16)  T(F): 98.2 (16 Oct 2024 23:56), Max: 98.4 (16 Oct 2024 11:16)  HR: 63 (16 Oct 2024 23:56) (63 - 100)  BP: 136/68 (16 Oct 2024 23:56) (97/78 - 142/77)  BP(mean): --  RR: 18 (16 Oct 2024 23:56) (18 - 18)  SpO2: 99% (16 Oct 2024 23:56) (98% - 100%)    Parameters below as of 16 Oct 2024 23:56  Patient On (Oxygen Delivery Method): nasal cannula  O2 Flow (L/min): 2    FOCUSED PHYSICAL EXAM:  NEURO: Non interactive  RESP: Even, unlabored, NC 2 L  CV: S1 S2 regular    LABS:                        11.9   8.52  )-----------( 72       ( 16 Oct 2024 06:44 )             35.9     10-16    145  |  115[H]  |  14  ----------------------------<  91  4.1   |  24  |  0.87    Ca    8.0[L]      16 Oct 2024 06:44  Phos  2.2     10-16  Mg     2.3     10-16    TPro  4.8[L]  /  Alb  1.8[L]  /  TBili  0.7  /  DBili  x   /  AST  61[H]  /  ALT  40  /  Alk Phos  79  10-16    TRANSESOPHAGEAL ECHOCARDIOGRAM REPORT  Electronically signed on 10/16/2024 at 5:19:40 PM by Jameel Rodriguez  CONCLUSIONS:    1. No echocardiographic evidence of vegetations.   2. Left ventricular systolic function is severely decreased with an ejection fraction visually estimated at 25 to 30 %.   3. Moderate mitral regurgitation.   4. Mild aortic regurgitation.   5. Moderate tricuspid regurgitation.   6. Trace pulmonic regurgitation.   7. Estimated pulmonary artery systolic pressure is 59 mmHg, consistent with moderate pulmonary hypertension.   8. No pericardial effusion seen.   9. Left pleural effusion noted.  10. Mild atheroma in the visualized portions of the descending aorta.  11. Compared to the transthoracic echocardiogram performed on 10/15/2024, a HEATHER was performed and additional information provided.    PLAN:   Vanc level at 12:30 PM 10/17  Per ID: plan is HEATHER (done) and if negative then will give short course therapy of 2 weeks of IV antibiotics ( Vanco)    FOLLOW UP / RESULT: with Dr Andres, vanc level, hold vanc until level therapeutic

## 2024-10-17 NOTE — PROGRESS NOTE ADULT - ATTENDING COMMENTS
Patient seen and examined this morning after return from HEATHER and later with Daughter visiting from [place] at bedside    85yo m with  PMH of HTN, HD, PAD s/p bilateral LE stents, BPH, HIV, p/w ams following presumed unwitnessed fall amd prolonged unsuspecified  time on floor for up to 3 days possibly.; in er, found to be meeting severe sirs/sepsis criteria + in AHRF requiring 3 lpm via nc; admitted with suspected aspiration iso fall, dehydration    Patient s/p HEATHER was drowsy and sleepy this morning but later this afternoon noted sitting up and eating lunch with daughter at bedside; more coherent speech today; Chest appears congested. No acute distress; denies any complaints; dose of IV Lasix was given this morning prior to HEATHER    Vital Signs Last 24 Hrs  T(C): 37 (17 Oct 2024 15:48), Max: 37 (17 Oct 2024 11:11)  T(F): 98.6 (17 Oct 2024 15:48), Max: 98.6 (17 Oct 2024 11:11)  HR: 102 (17 Oct 2024 15:48) (63 - 102)  BP: 120/63 (17 Oct 2024 15:48) (97/78 - 136/68)  RR: 18 (17 Oct 2024 15:48) (18 - 18)  SpO2: 99% (17 Oct 2024 15:48) (98% - 100%) nasal cannula O2 Flow (L/min): 2       P/E:  elderly male, appear chronically ill and somewhat disheveled,   Neuro: AAO x 2.5 today; improved mentation  Chest : B/L Coarse breath sounds  CVS: S1S2 present  Abdomen, soft, BS+, NT; mildly distended  Ext: significant edema of RUE and RLE with skin breakdown, right (may be portal of entry for bacteremia)    Labs:                         11.6   7.58  )-----------( 82       ( 17 Oct 2024 07:16 )             34.9   10-17    145  |  114[H]  |  15  ----------------------------<  83  3.6   |  26  |  0.98  Ca    7.8[L]      17 Oct 2024 07:16  Phos  2.5     10-17  Mg     2.1     10-17  TPro  4.8[L]  /  Alb  1.7[L]  /  TBili  0.6  /  DBili  x   /  AST  58[H]  /  ALT  42  /  AlkPhos  83  10-17                   Folate, Serum (10.14.24 @ 06:40) Folate, Serum: <2.0: Test Repeated ng/mL  Thyroid Stimulating Hormone, Serum: 2.25 uU/mL (10.14.24 @ 06:40)     Lactate, Blood (10.14.24 @ 06:40) Lactate, Blood: 1.5 mmol/L  Lactate, Blood (10.12.24 @ 19:00) Lactate, Blood: 2.9:    Culture - Blood (10.12.24 @ 15:15)   Growth in aerobic and anaerobic bottles: Aerococcus viridans   Growth in anaerobic bottle: Aerococcus urinae     Xray Chest 1 View AP/PA (10.14.24 @ 09:00)   IMPRESSION: Increasing bibasilar fluid accumulations.    CT Head No Cont (10.12.24 @ 18:44)   Impression: Normal CT of the brain.    A/P:  Sepsis with Bacteremia due to Aerococcus viridans etiology ?LE Ulcers   concern for Endocarditis clinically doubt  A. Fib with RVR ppt by severe infection much improved HR  Acute Hypoxic Resp. failure with Suspected Aspiration Pneumonia  Right facial droop and Right hemiparesis  Severe folate deficiency  B/L LE edema etiology ?? Venous insufficiency vs   Lactic acidosis possibly starvation ketoacidosis  Hypernatremia secondary to dehydration  Rhabdomyolysis due to fall and prolonged immobilization  Hx PAD with B/L stents  Elevated troponin likely demand ischemia    Plan:  Continue IV Vanco and Ceftriaxone for now  ID consult; d/w Dr. Andres recommend HEATHER; HEATHER negative for Endocarditis; Discussed with Cardio Dr. Rodriguez;   Cardiology f/u Dr. Daniel appreciated; better controlled HR s/p Digoxin and resuming low dose Metoprolol 25 mg q 12 hrs  Right hemiparesis, r/o CNS emboli vs CNS mass; will get Neuro eval; will benefit from an MRI with and without contrast in my opinion given Hx HIV  Repeat Blood Cx testing; so far negative; as d/w ID will likely Rx with ABX x 2 weeks as HEATHER negative  Nutrition consultation; Add Ensure BID  Follow up HIV VL and CD4, resume ARV,   PT/ OT eval  Social work/ Case mgmt consult for disposition once medically stable; critically ill at this time  .  Discussed with Daughter at bedside and reviewed findings and plan of care as above  Discussed with NP Dannielle Patient seen and examined this afternoon and later spoke with Daughter visiting from [place]     83yo m with  PMH of HTN, HD, PAD s/p bilateral LE stents, BPH, HIV, p/w ams following presumed unwitnessed fall amd prolonged unsuspecified  time on floor for up to 3 days possibly.; IN ED, found to be meeting severe sirs/sepsis criteria + in AHRF requiring 3 lpm via nc; admitted with suspected aspiration iso fall, dehydration    Patient found to be bacteremic with Aerococcus viridans; seen by ID being Rx with Vanco and Cefepime; Patient s/p HEATHER yesterday more coherent speech today; Chest still appears congested. No acute distress; denies any new complaints; leg edema appears improved today;     Vital Signs Last 24 Hrs  T(C): 37 (17 Oct 2024 15:48), Max: 37 (17 Oct 2024 11:11)  T(F): 98.6 (17 Oct 2024 15:48), Max: 98.6 (17 Oct 2024 11:11)  HR: 102 (17 Oct 2024 15:48) (63 - 102)  BP: 120/63 (17 Oct 2024 15:48) (97/78 - 136/68)  RR: 18 (17 Oct 2024 15:48) (18 - 18)  SpO2: 99% (17 Oct 2024 15:48) (98% - 100%) nasal cannula O2 Flow (L/min): 2     P/E:  elderly male, appear chronically ill and somewhat disheveled,   Neuro: AAO x 3; improved mentation  Chest : B/L Coarse breath sounds  CVS: S1S2 present  Abdomen, soft, BS+, NT; mildly distended  Ext: significant edema of RUE and RLE with skin breakdown, right (may be portal of entry for bacteremia)    Labs:                         11.6   7.58  )-----------( 82       ( 17 Oct 2024 07:16 )             34.9   10-17    145  |  114[H]  |  15  ----------------------------<  83  3.6   |  26  |  0.98  Ca    7.8[L]      17 Oct 2024 07:16  Phos  2.5     10-17  Mg     2.1     10-17  TPro  4.8[L]  /  Alb  1.7[L]  /  TBili  0.6  /  DBili  x   /  AST  58[H]  /  ALT  42  /  AlkPhos  83  10-17                   Folate, Serum (10.14.24 @ 06:40) Folate, Serum: <2.0: Test Repeated ng/mL  Thyroid Stimulating Hormone, Serum: 2.25 uU/mL (10.14.24 @ 06:40)     Lactate, Blood (10.14.24 @ 06:40) Lactate, Blood: 1.5 mmol/L  Lactate, Blood (10.12.24 @ 19:00) Lactate, Blood: 2.9:    Culture - Blood (10.12.24 @ 15:15)   Growth in aerobic and anaerobic bottles: Aerococcus viridans   Growth in anaerobic bottle: Aerococcus urinae     Xray Chest 1 View AP/PA (10.14.24 @ 09:00)   IMPRESSION: Increasing bibasilar fluid accumulations.    CT Head No Cont (10.12.24 @ 18:44)   Impression: Normal CT of the brain.    A/P:  Sepsis with Bacteremia due to Aerococcus viridans etiology ?LE Ulcers   concern for Endocarditis ruled out  A. Fib with RVR ppt by severe infection much improved HR  Acute Hypoxic Resp. failure with Suspected Aspiration Pneumonia  Right facial droop and Right hemiparesis concern for stroke  Severe folate deficiency  B/L LE edema etiology ?? Venous insufficiency   Lactic acidosis possibly starvation ketoacidosis  Hypernatremia secondary to dehydration resolved  Rhabdomyolysis due to fall and prolonged immobilization  Hx PAD with B/L stents  Elevated troponin likely demand ischemia    Plan:  Continue IV Vanco and Ceftriaxone for now; Vanco trough elevated; Hold dose and resume at reduced dose; ID follow up  HEATHER negative for Endocarditis; Likely to treat current bacteremia for 2 weeks   Cardiology f/u  better controlled HR s/p Digoxin and resuming low dose Metoprolol 25 mg q 12 hrs; if HR remain elevated increase to 25 mg q 8 hrs  Right hemiparesis, r/o CNS emboli vs CNS mass; Neuro eval appreciated; d/w Dr. More; MRI with and without contrast given Hx HIV  Lasix 20 mg IV x one more dose today; Repeat CXR AM; if remain congested; get Non contrast CT tomorrow and Pulmonary consult  Repeat Blood Cx testing; so far negative;   Nutrition consultation; Add Ensure BID  Follow up HIV VL and CD4, resume ARV,   PT/ OT eval; will need [facility] placement; Daughter has choices already  Social work/ Case mgmt consult for disposition once medically stable; critically ill at this time although improved;   .  Discussed with Daughter at bedside and reviewed findings and plan of care as above  Discussed with SMILEY Faustin and RN

## 2024-10-17 NOTE — PROGRESS NOTE ADULT - PROBLEM SELECTOR PLAN 12
pt is from home  PT recs [facility]  F/U MRI head  f/u doppler, CRISTIAN, & right foot x-ray -- recs by vascular and podiatry

## 2024-10-17 NOTE — PROVIDER CONTACT NOTE (CRITICAL VALUE NOTIFICATION) - NAME OF MD/NP/PA/DO NOTIFIED:
SMILEY Rodriguez
SMILEY Galicia
SMILEY Rodriguez
SMILEY Rodriguez
NP Soh-Chol
SMILEY Chavez
KAI Figueroa
SMILEY Chirinos

## 2024-10-17 NOTE — PROGRESS NOTE ADULT - ASSESSMENT
84-year-old man from home with hypertension, PVD, BPH, HIV. Patient baseline A+Ox3. Patient present to ED lethargic after being found by daughter on floor covered in feces and urine. Patient admitted telemetry for sepsis, afib with RVR & Acute hypoxic respiratory failure secondary to pneumonia.

## 2024-10-17 NOTE — PROVIDER CONTACT NOTE (CRITICAL VALUE NOTIFICATION) - PERSON GIVING RESULT:
Andrei/edson
[facility], Rere Amato
Brenda/edson
[place]/lab
Bishnu/ edson
[facility]/[place] Amato
VALERY Rosario/edson
[facility]/[place] Amato

## 2024-10-17 NOTE — PROGRESS NOTE ADULT - PROBLEM SELECTOR PLAN 1
- p/w 02 88% on room air; secondary to suspected aspiration pneumonia  - CXR (10/12)- Hazy bibasilar opacities suggestive of multifocal pneumonia.  - Cxr (10/14) increased bibasilar fluid accumulation---> s/p lasix IVP x 1  - c/w ceftriaxone and vancomycin  - completed azithromycin  - continue Chest PT, Duoneb & incentive spirometer  - wean off O2 to maintain SPO2 >92%  - SLP recs Minced & Moist solids w/ thin liquids

## 2024-10-17 NOTE — PROVIDER CONTACT NOTE (CRITICAL VALUE NOTIFICATION) - TEST AND RESULT REPORTED:
Bcx from 10/12 aerobic bottle in gram+ cocci in clusters and anaerobic bottle gram+ cocci in pairs
potassium
Treponema positive
Troponin 339.5
calcium
Bcx 10/12 in aerobic bottle: gram + cocci in clusters
second blood culture from 10/12 - growth in aerobic bottles, gram positive cocci in clusters
vanco level through 25.2

## 2024-10-17 NOTE — PROGRESS NOTE ADULT - PROBLEM SELECTOR PLAN 8
- nonpalpable pulses dp/pt b/l.  - f/u venous/arterial duplex studies  - vascular following  - Podiatry following reccs: no clinical signs of acute infection                                      R leg dressed with Santyl/DSD                                      L toe wounds dressed with Betadine/DSD

## 2024-10-17 NOTE — PROGRESS NOTE ADULT - PROBLEM SELECTOR PLAN 2
- 2/2 pneumonia, bacteremia (Aerococcus viridans, Aerococcus urinae), multiple pressure ulcer  - Bcx (10/12)- aerococcus viridans, Aerococcus urinae  - Bcx(10/13 and 10/15)- NGTD  - TTE: EF 20-25%; Global LV hypokinesis. Severe left ventricular hypertrophy.  - HEATHER negative for vegetations with reduced EF  - c/w ceftriaxone and vancomycin  - vanc trough high this AM, frequency changed to daily  - ID Dr Andres reccs: if HEATHER negative then will give short course therapy of 2 weeks of IV antibiotics (Vanco)  - pt will need a PICC line for vancomycin until 10/27/24

## 2024-10-17 NOTE — PROGRESS NOTE ADULT - SUBJECTIVE AND OBJECTIVE BOX
NEUROLOGY FOLLOW-UP NOTE    NAME:  MAJOR ARIADNA      ASSESSMENT:  84 RHM with transient right-sided hemiparesis, concerning for transient ischemic attack, aggravated by sepsis and folic acid deficiency, without neuroimaging evidence for acute ischemic stroke      RECOMMENDATIONS:      1. Stroke workup  - Telemetry monitoring while inpatient  - Hemoglobin A1c  - Fasting lipid panel    2. Secondary stroke prevention  - Q4H Neurochecks & Vital signs  - Patient has passed a bedside swallow evaluation  - Antiplatelets on hold since Plt count < 100K  - Continue Rosuvastatin 20mg PO QHS (may adjust dose to achieve goal LDL < 70 mg/dL)  - Treat BP if over 140/90 (goal /80) - Maintain home antihypertensive medications, No role for permissive hypertension at this time  - Continue Folic acid 1mg PO Daily to treat folic acid deficiency  - Treatment of sepsis as per primary team  - PT/OT  - DVT ppx: SCDs, Heparin          NOTE TO BE COMPLETED - PLEASE REFER TO ABOVE ONLY AND IGNORE INFORMATION BELOW    ******************************    HPI:  84-year-old man from home with hypertension, PVD, BPH, HIV (on [place]??) coming with his daughter after she found him on the floor.  She had been trying to get in touch with him for 3 days but was unable so came up to [place] from [place], where she had recently moved, and found him on the floor covered in feces and urine.  He was confused at the time.  He does not recall details about how he got to the floor.  Per daughter, he is independent and mentate as well at baseline. On exam, he appears to be A&O x 3 but he is very difficult to understand.  He appears very dry and sounds like he is swallowing his tongue.  His daughter has difficulty understanding him as well.    Exam is notable for very dry appearance, garbled speech, rhonchi on all anterior lung fields, saturating 88% on room air and requiring 3 L nasal cannula, bilateral lower extremity pitting edema with old and new wounds on the legs, and an abrasion on the forehead.     ED Course    Vitals: HRmax 115, O2 88% on RA, requiring 3L NC  Labs: WBC 13, plt 82, trops 550>538, lactate 2.3>2.9, BUN/Cr 39/1.26, gluc 64, , UA with RBC, granular casts, protein  Intervention: s/p zosyn, 2.5L IVF  Consults:   Images: CTH wnl; CXR with bl LL consolidations on wet read        (12 Oct 2024 21:33)      NEURO HPI:      INTERVAL HISTORY:      MEDICATIONS:  albuterol/ipratropium for Nebulization 3 milliLiter(s) Nebulizer every 6 hours  aluminum hydroxide/magnesium hydroxide/simethicone Suspension 30 milliLiter(s) Oral every 4 hours PRN  ascorbic acid 500 milliGRAM(s) Oral daily  benzonatate 100 milliGRAM(s) Oral every 8 hours PRN  cefTRIAXone   IVPB 2000 milliGRAM(s) IV Intermittent every 24 hours  folic acid 1 milliGRAM(s) Oral <User Schedule>  guaiFENesin  milliGRAM(s) Oral every 12 hours PRN  heparin   Injectable 5000 Unit(s) SubCutaneous every 12 hours  influenza  Vaccine (HIGH DOSE) 0.5 milliLiter(s) IntraMuscular once  melatonin 3 milliGRAM(s) Oral at bedtime PRN  metoprolol tartrate 25 milliGRAM(s) Oral two times a day  multivitamin 1 Tablet(s) Oral daily  ondansetron Injectable 4 milliGRAM(s) IV Push every 8 hours PRN  pantoprazole    Tablet 40 milliGRAM(s) Oral before breakfast  rosuvastatin 20 milliGRAM(s) Oral at bedtime  sodium chloride 3%  Inhalation 4 milliLiter(s) Inhalation every 12 hours  tamsulosin 0.4 milliGRAM(s) Oral at bedtime  vancomycin  IVPB 1000 milliGRAM(s) IV Intermittent every 24 hours      ALLERGIES:  No Known Allergies      REVIEW OF SYSTEMS:  Fourteen systems reviewed and negative except as in HPI / Interval History.          OBJECTIVE:  Vital Signs Last 24 Hrs  T(C): 36.7 (17 Oct 2024 23:28), Max: 37 (17 Oct 2024 11:11)  T(F): 98.1 (17 Oct 2024 23:28), Max: 98.6 (17 Oct 2024 11:11)  HR: 99 (17 Oct 2024 23:28) (97 - 102)  BP: 131/86 (17 Oct 2024 23:28) (112/71 - 131/86)  BP(mean): --  RR: 18 (17 Oct 2024 23:28) (18 - 18)  SpO2: 98% (17 Oct 2024 23:28) (94% - 100%)    Parameters below as of 17 Oct 2024 23:28  Patient On (Oxygen Delivery Method): nasal cannula  O2 Flow (L/min): 2      General Examination:  General: No acute distress  HEENT: Atraumatic, Normocephalic  Respiratory: CTA B/l.  No crackles, rhonchi, or wheezes.  Cardiovascular: RRR.  Normal S1 & S2.  Normal b/l radial and pedal pulses.    Neurological Examination:  General / Mental Status: AAO x 3.  No aphasia or dysarthria.  Naming and repetition intact.  Cranial Nerves: VFF x 4.  PERRL.  EOMI x 2, No nystagmus or diplopia.  B/l V1-V3 equal and intact to light touch and pinprick.  Symmetric facial movement and palate elevation.  B/l hearing equal to finger rub.  5/5 strength with b/l sternocleidomastoid and trapezius.  Midline tongue protrusion, with no atrophy or fasciculations.  Motor: Normal bulk & tone in all four extremities.  5/5 strength throughout all four extremities.  No downward drift, rigidity, spasticity, or tremors in any of the four extremities.  Sensory: Intact to light touch and pinprick in all four extremities.  Negative Romberg.  Reflex: 2+ and symmetric at b/l biceps, triceps, brachioradialis, patellae, and ankles.  Downgoing toes b/l.  Coordination: No dysmetria with b/l finger-to-nose and heel raise tests.  Symmetric rapid alternating movements b/l.  Gait: Normal, narrow-based gait.  No difficulty with tiptoe, heel, and tandem gaits.        LABORATORY VALUES:                          11.6   7.58  )-----------( 82       ( 17 Oct 2024 07:16 )             34.9       10-17    145  |  114[H]  |  15  ----------------------------<  83  3.6   |  26  |  0.98    Ca    7.8[L]      17 Oct 2024 07:16  Phos  2.5     10-17  Mg     2.1     10-17    TPro  4.8[L]  /  Alb  1.7[L]  /  TBili  0.6  /  DBili  x   /  AST  58[H]  /  ALT  42  /  AlkPhos  83  10-17    Glucose Trend  10-17-24 @ 07:16   -  83 -- --  10-16-24 @ 06:44   -  91 -- --  10-15-24 @ 22:45   -  106[H] -- --  10-15-24 @ 07:10   -  113[H] -- --      Folate, Serum: <2.0 ng/mL (10-14-24 @ 06:40)  Vitamin B12, Serum: >2000 pg/mL (10-14-24 @ 06:40)          NEUROIMAGING:          Please contact the Neurology consult service with any neurological questions.      Rudy More MD   of Neurology  [facility]

## 2024-10-17 NOTE — PROGRESS NOTE ADULT - ASSESSMENT
84M s/p fall with sepsis, aspiration pneumonia, and rhabdo  vascular consulted for nonpalpable pulses dp/pt b/l. Right lateral leg wound, left toe wounds    -medical workup  -f/u venous/arterial duplex studies (ordered)  -b/l ACE wrap from toes to knees for edema afterwards  -local wound care per podiatry   -care per medical team   -signed out to PA covering [id number]

## 2024-10-17 NOTE — PROGRESS NOTE ADULT - SUBJECTIVE AND OBJECTIVE BOX
NP Note discussed with  Primary Attending    Patient is a 84y old  Male who presents with a chief complaint of sepsis (16 Oct 2024 22:10)      INTERVAL HPI/OVERNIGHT EVENTS:      MEDICATIONS  (STANDING):  albuterol/ipratropium for Nebulization 3 milliLiter(s) Nebulizer every 6 hours  ascorbic acid 500 milliGRAM(s) Oral daily  cefTRIAXone   IVPB 2000 milliGRAM(s) IV Intermittent every 24 hours  folic acid 1 milliGRAM(s) Oral <User Schedule>  heparin   Injectable 5000 Unit(s) SubCutaneous every 12 hours  influenza  Vaccine (HIGH DOSE) 0.5 milliLiter(s) IntraMuscular once  metoprolol tartrate 25 milliGRAM(s) Oral two times a day  multivitamin 1 Tablet(s) Oral daily  pantoprazole    Tablet 40 milliGRAM(s) Oral before breakfast  rosuvastatin 20 milliGRAM(s) Oral at bedtime  sodium chloride 3%  Inhalation 4 milliLiter(s) Inhalation every 12 hours  tamsulosin 0.4 milliGRAM(s) Oral at bedtime  vancomycin  IVPB 1000 milliGRAM(s) IV Intermittent every 24 hours    MEDICATIONS  (PRN):  aluminum hydroxide/magnesium hydroxide/simethicone Suspension 30 milliLiter(s) Oral every 4 hours PRN Dyspepsia  benzonatate 100 milliGRAM(s) Oral every 8 hours PRN Cough  guaiFENesin  milliGRAM(s) Oral every 12 hours PRN Cough  melatonin 3 milliGRAM(s) Oral at bedtime PRN Insomnia  ondansetron Injectable 4 milliGRAM(s) IV Push every 8 hours PRN Nausea and/or Vomiting      __________________________________________________  REVIEW OF SYSTEMS:    CONSTITUTIONAL: No fever,   EYES: no acute visual disturbances  NECK: No pain or stiffness  RESPIRATORY: No cough; No shortness of breath  CARDIOVASCULAR: No chest pain, no palpitations  GASTROINTESTINAL: No pain. No nausea or vomiting; No diarrhea   NEUROLOGICAL: No headache or numbness, no tremors  MUSCULOSKELETAL: No joint pain, no muscle pain  GENITOURINARY: no dysuria, no frequency, no hesitancy  PSYCHIATRY: no depression , no anxiety  ALL OTHER  ROS negative        Vital Signs Last 24 Hrs  T(C): 37 (17 Oct 2024 11:11), Max: 37 (17 Oct 2024 11:11)  T(F): 98.6 (17 Oct 2024 11:11), Max: 98.6 (17 Oct 2024 11:11)  HR: 97 (17 Oct 2024 11:11) (63 - 100)  BP: 120/65 (17 Oct 2024 11:11) (97/78 - 142/77)  BP(mean): --  RR: 18 (17 Oct 2024 11:11) (18 - 18)  SpO2: 98% (17 Oct 2024 11:11) (98% - 100%)    Parameters below as of 17 Oct 2024 11:11  Patient On (Oxygen Delivery Method): nasal cannula  O2 Flow (L/min): 2      ________________________________________________  PHYSICAL EXAM:  GENERAL: NAD  HEENT: Normocephalic;  conjunctivae and sclerae clear; moist mucous membranes;   NECK : supple  CHEST/LUNG: Clear to auscultation bilaterally with good air entry   HEART: S1 S2  regular; no murmurs, gallops or rubs  ABDOMEN: Soft, Nontender, Nondistended; Bowel sounds present  EXTREMITIES: no cyanosis; no edema; no calf tenderness  SKIN: warm and dry; no rash  NERVOUS SYSTEM:  Awake and alert; Oriented  to place, person and time ; no new deficits    _________________________________________________  LABS:                        11.6   7.58  )-----------( 82       ( 17 Oct 2024 07:16 )             34.9     10-17    145  |  114[H]  |  15  ----------------------------<  83  3.6   |  26  |  0.98    Ca    7.8[L]      17 Oct 2024 07:16  Phos  2.5     10-17  Mg     2.1     10-17    TPro  4.8[L]  /  Alb  1.7[L]  /  TBili  0.6  /  DBili  x   /  AST  58[H]  /  ALT  42  /  AlkPhos  83  10-17      Urinalysis Basic - ( 17 Oct 2024 07:16 )    Color: x / Appearance: x / SG: x / pH: x  Gluc: 83 mg/dL / Ketone: x  / Bili: x / Urobili: x   Blood: x / Protein: x / Nitrite: x   Leuk Esterase: x / RBC: x / WBC x   Sq Epi: x / Non Sq Epi: x / Bacteria: x      CAPILLARY BLOOD GLUCOSE            RADIOLOGY & ADDITIONAL TESTS:    Imaging Personally Reviewed:  YES/NO    Consultant(s) Notes Reviewed:   YES/ No    Care Discussed with Consultants :     Plan of care was discussed with patient and /or primary care giver; all questions and concerns were addressed and care was aligned with patient's wishes.     NP Note discussed with  Primary Attending    Patient is a 84y old  Male who presents with a chief complaint of sepsis (16 Oct 2024 22:10)      INTERVAL HPI/OVERNIGHT EVENTS:  84-year-old man from home with hypertension, PVD, BPH, HIV. Patient baseline A+Ox3. Patient present to ED lethargic after being found by daughter on floor covered in feces and urine. Patient admitted telemetry for sepsis, afib with RVR & Acute hypoxic respiratory failure secondary to pneumonia. CT head no acute hemorrhage or infarct, with microvascular changes and volume loss. Chest x-ray with moderate left loculated effusion. Pt found to have bacteremia with blood culture resulting Staphylococcus epidermidis-likely contaminate, Aerococcus viridans, Aerococcus urinae. Repeat blood culture on 10/15 negative. ID following, with HEATHER negative, will need 2 weeks of abx course. Patient treated with Rocephin and Vancomycin. Patient with multiple wounds/pressure ulcers, Wound care followed. Podiatry also following.    Cardiology following. Echo showed with reduced EF 20-25%, global LV hypokinesis. G1DD, normal pulmonary artery pressure, severe LV hypertrophy. HEATHER negative for vegetations with moderate pulm HTN  Vascular following, F/U doppler studies, CRISTIAN  Neuro also following for right hemiparesis, pending MR brain    PT recs [facility]    MEDICATIONS  (STANDING):  albuterol/ipratropium for Nebulization 3 milliLiter(s) Nebulizer every 6 hours  ascorbic acid 500 milliGRAM(s) Oral daily  cefTRIAXone   IVPB 2000 milliGRAM(s) IV Intermittent every 24 hours  folic acid 1 milliGRAM(s) Oral <User Schedule>  heparin   Injectable 5000 Unit(s) SubCutaneous every 12 hours  influenza  Vaccine (HIGH DOSE) 0.5 milliLiter(s) IntraMuscular once  metoprolol tartrate 25 milliGRAM(s) Oral two times a day  multivitamin 1 Tablet(s) Oral daily  pantoprazole    Tablet 40 milliGRAM(s) Oral before breakfast  rosuvastatin 20 milliGRAM(s) Oral at bedtime  sodium chloride 3%  Inhalation 4 milliLiter(s) Inhalation every 12 hours  tamsulosin 0.4 milliGRAM(s) Oral at bedtime  vancomycin  IVPB 1000 milliGRAM(s) IV Intermittent every 24 hours    MEDICATIONS  (PRN):  aluminum hydroxide/magnesium hydroxide/simethicone Suspension 30 milliLiter(s) Oral every 4 hours PRN Dyspepsia  benzonatate 100 milliGRAM(s) Oral every 8 hours PRN Cough  guaiFENesin  milliGRAM(s) Oral every 12 hours PRN Cough  melatonin 3 milliGRAM(s) Oral at bedtime PRN Insomnia  ondansetron Injectable 4 milliGRAM(s) IV Push every 8 hours PRN Nausea and/or Vomiting      __________________________________________________  REVIEW OF SYSTEMS:  "Feeling weak"  CONSTITUTIONAL: No fever,   EYES: no acute visual disturbances  NECK: No pain or stiffness  RESPIRATORY: No cough; No shortness of breath  CARDIOVASCULAR: No chest pain, no palpitations  GASTROINTESTINAL: No pain. No nausea or vomiting; No diarrhea   NEUROLOGICAL: No headache or numbness, no tremors  MUSCULOSKELETAL: No joint pain, no muscle pain  GENITOURINARY: no dysuria, no frequency, no hesitancy  ALL OTHER  ROS negative        Vital Signs Last 24 Hrs  T(C): 37 (17 Oct 2024 11:11), Max: 37 (17 Oct 2024 11:11)  T(F): 98.6 (17 Oct 2024 11:11), Max: 98.6 (17 Oct 2024 11:11)  HR: 97 (17 Oct 2024 11:11) (63 - 100)  BP: 120/65 (17 Oct 2024 11:11) (97/78 - 142/77)  BP(mean): --  RR: 18 (17 Oct 2024 11:11) (18 - 18)  SpO2: 98% (17 Oct 2024 11:11) (98% - 100%)    Parameters below as of 17 Oct 2024 11:11  Patient On (Oxygen Delivery Method): nasal cannula  O2 Flow (L/min): 2      ________________________________________________  PHYSICAL EXAM:  GENERAL: NAD  HEENT: Normocephalic; moist mucous membranes;   NECK : supple  CHEST/LUNG: B/L LL rales   HEART: S1 S2  regular;  ABDOMEN: Soft, Nontender, Nondistended; Bowel sounds present  EXTREMITIES: no cyanosis; + edema; no calf tenderness  SKIN: warm and dry; no rash; + RLE wound  NERVOUS SYSTEM:  Awake and alert; Oriented to place, person and time    _________________________________________________  LABS:                        11.6   7.58  )-----------( 82       ( 17 Oct 2024 07:16 )             34.9     10-17    145  |  114[H]  |  15  ----------------------------<  83  3.6   |  26  |  0.98    Ca    7.8[L]      17 Oct 2024 07:16  Phos  2.5     10-17  Mg     2.1     10-17    TPro  4.8[L]  /  Alb  1.7[L]  /  TBili  0.6  /  DBili  x   /  AST  58[H]  /  ALT  42  /  AlkPhos  83  10-17      Urinalysis Basic - ( 17 Oct 2024 07:16 )    Color: x / Appearance: x / SG: x / pH: x  Gluc: 83 mg/dL / Ketone: x  / Bili: x / Urobili: x   Blood: x / Protein: x / Nitrite: x   Leuk Esterase: x / RBC: x / WBC x   Sq Epi: x / Non Sq Epi: x / Bacteria: x      CAPILLARY BLOOD GLUCOSE            RADIOLOGY & ADDITIONAL TESTS:  < from: Xray Chest 1 View AP/PA (10.14.24 @ 09:00) >    ACC: [id number] EXAM:  XR CHEST AP OR PA 1V   ORDERED BY: SUJIT PARISH     PROCEDURE DATE:  10/14/2024          INTERPRETATION:  AP erect chest on October 14, 2024 at 8:32 AM. Patient   has rhonchi.    Heart difficult to assess.    There is a moderate left base effusion. There is a loculated effusion and   minor fissure.    These basilar findings have increased from October 12.    IMPRESSION: Increasing bibasilar fluid accumulations.    --- End of Report ---            KEIRA CHAN MD; Attending Radiologist  This document has been electronically signed. Oct 14 2024  3:12PM    < end of copied text >    Imaging Personally Reviewed:  YES/NO    Consultant(s) Notes Reviewed:   YES/ No    Care Discussed with Consultants :     Plan of care was discussed with patient and /or primary care giver; all questions and concerns were addressed and care was aligned with patient's wishes.

## 2024-10-17 NOTE — PROGRESS NOTE ADULT - SUBJECTIVE AND OBJECTIVE BOX
INTERVAL HPI/OVERNIGHT EVENTS:  Pt resting comfortably. No acute complaints.   Tolerating diet.   Diet, Minced and Moist:   DASH/TLC Sodium & Cholesterol Restricted  Supplement Feeding Modality:  Oral  Ensure Compact Cans or Servings Per Day:  1       Frequency:  Two Times a day (10-16-24 @ 16:11) [Active]        flatus/BM.   Denies N/V    MEDICATIONS  (STANDING):  albuterol/ipratropium for Nebulization 3 milliLiter(s) Nebulizer every 6 hours  ascorbic acid 500 milliGRAM(s) Oral daily  cefTRIAXone   IVPB 2000 milliGRAM(s) IV Intermittent every 24 hours  folic acid 1 milliGRAM(s) Oral <User Schedule>  furosemide   Injectable 20 milliGRAM(s) IV Push once  heparin   Injectable 5000 Unit(s) SubCutaneous every 12 hours  influenza  Vaccine (HIGH DOSE) 0.5 milliLiter(s) IntraMuscular once  metoprolol tartrate 25 milliGRAM(s) Oral two times a day  multivitamin 1 Tablet(s) Oral daily  pantoprazole    Tablet 40 milliGRAM(s) Oral before breakfast  rosuvastatin 20 milliGRAM(s) Oral at bedtime  sodium chloride 3%  Inhalation 4 milliLiter(s) Inhalation every 12 hours  tamsulosin 0.4 milliGRAM(s) Oral at bedtime  vancomycin  IVPB 1000 milliGRAM(s) IV Intermittent every 24 hours    MEDICATIONS  (PRN):  aluminum hydroxide/magnesium hydroxide/simethicone Suspension 30 milliLiter(s) Oral every 4 hours PRN Dyspepsia  benzonatate 100 milliGRAM(s) Oral every 8 hours PRN Cough  guaiFENesin  milliGRAM(s) Oral every 12 hours PRN Cough  melatonin 3 milliGRAM(s) Oral at bedtime PRN Insomnia  ondansetron Injectable 4 milliGRAM(s) IV Push every 8 hours PRN Nausea and/or Vomiting      Vital Signs Last 24 Hrs  T(C): 37 (17 Oct 2024 11:11), Max: 37 (17 Oct 2024 11:11)  T(F): 98.6 (17 Oct 2024 11:11), Max: 98.6 (17 Oct 2024 11:11)  HR: 97 (17 Oct 2024 11:11) (63 - 100)  BP: 120/65 (17 Oct 2024 11:11) (97/78 - 136/68)  BP(mean): --  RR: 18 (17 Oct 2024 11:11) (18 - 18)  SpO2: 98% (17 Oct 2024 11:11) (98% - 100%)    Parameters below as of 17 Oct 2024 11:11  Patient On (Oxygen Delivery Method): nasal cannula  O2 Flow (L/min): 2      Physical:  Extremities: b/l LE warm to touch, edema noted to both LE palpable fem b/l. right lateral leg wound dressing dry, no active purulence. no soft tissue crepitus, no clinical signs of ascending infection, compartments soft bilaterally  Skin:  Warm and dry   Musculoskeletal:  No calf tenderness       I&O's Detail    17 Oct 2024 07:01  -  17 Oct 2024 15:46  --------------------------------------------------------  IN:    Oral Fluid: 450 mL  Total IN: 450 mL    OUT:  Total OUT: 0 mL  Total NET: 450 mL    LABS:                      11.6   7.58  )-----------( 82       ( 17 Oct 2024 07:16 )             34.9             10-17    145  |  114[H]  |  15  ----------------------------<  83  3.6   |  26  |  0.98    Ca    7.8[L]      17 Oct 2024 07:16  Phos  2.5     10-17  Mg     2.1     10-17    TPro  4.8[L]  /  Alb  1.7[L]  /  TBili  0.6  /  DBili  x   /  AST  58[H]  /  ALT  42  /  AlkPhos  83  10-17

## 2024-10-17 NOTE — PROGRESS NOTE ADULT - PROBLEM SELECTOR PLAN 5
- not on HIV meds as per pharmacy  - CD4 % 32, CD4 count 205  - ID Dmitry chow: Hold his HIV meds for now

## 2024-10-17 NOTE — PROGRESS NOTE ADULT - PROBLEM SELECTOR PLAN 6
- 's with hypotension --->s/p Digoxin 0.5mg IVP x1 given  - elevated troponin likely ischemic demand -- downtrended  - TTE: EF 20-25%; Global LV hypokinesis. Severe left ventricular hypertrophy.  - c/w metoprolol 25mg BID  - HR now controlled (90's)  - Cardiology Dr. Daniel

## 2024-10-17 NOTE — PROGRESS NOTE ADULT - NS ATTEND AMEND GEN_ALL_CORE FT
Patient brought in by EMS. Found down. Reportedly family could not reach him for three days. Unclear how long patient was down. Vascular surgery consulted for history of BLE PAD with superficial dorsal left toe wounds. MRI brain negative for acute pathology. Bacteremic with findings c/w pneumonia. CRISTIAN/PVR ordered. On exam, patient able to answer questions appropriately. Sensorimotor function intact. Superficial ulcers of posterior/lateral calves and superficial ulceration of toes. Femoral pulses and pedal signals present.   -Evaluation for other injuries, etiology of presumed fall per primary team  -Local wound care to superficial wounds of lower extremities  -Care per primary team
Patient brought in by EMS. Found down. Reportedly family could not reach him for three days. Unclear how long patient was down. Vascular surgery consulted for history of BLE PAD with superficial dorsal left toe wounds. MRI brain negative for acute pathology. Bacteremic with findings c/w pneumonia. CRISTIAN/PVR ordered. On exam, patient able to answer questions appropriately. Sensorimotor function intact. Superficial ulcers of posterior/lateral calves and superficial ulceration of toes. Femoral pulses and pedal signals present.   -Evaluation for other injuries, etiology of presumed fall per primary team  -Local wound care to superficial wounds of lower extremities  -Care per primary team

## 2024-10-17 NOTE — PROGRESS NOTE ADULT - PROBLEM SELECTOR PLAN 9
- likely in the setting of sepsis and HIV  - Plt count 82 -- improving  - No signs of active bleeding  - trend CBC

## 2024-10-17 NOTE — PROGRESS NOTE ADULT - PROBLEM SELECTOR PLAN 4
- F/U MR head to evaluate intracranial lesion  - continue statin  - holding plavix 2/2 thrombocytopenia  - treat if over 140/90 (goal /80)  - neuro consulted Dr More

## 2024-10-18 LAB
ALBUMIN SERPL ELPH-MCNC: 1.8 G/DL — LOW (ref 3.5–5)
ALP SERPL-CCNC: 86 U/L — SIGNIFICANT CHANGE UP (ref 40–120)
ALT FLD-CCNC: 46 U/L DA — SIGNIFICANT CHANGE UP (ref 10–60)
ANION GAP SERPL CALC-SCNC: 6 MMOL/L — SIGNIFICANT CHANGE UP (ref 5–17)
AST SERPL-CCNC: 56 U/L — HIGH (ref 10–40)
BASOPHILS # BLD AUTO: 0.01 K/UL — SIGNIFICANT CHANGE UP (ref 0–0.2)
BASOPHILS NFR BLD AUTO: 0.1 % — SIGNIFICANT CHANGE UP (ref 0–2)
BILIRUB SERPL-MCNC: 0.6 MG/DL — SIGNIFICANT CHANGE UP (ref 0.2–1.2)
BUN SERPL-MCNC: 16 MG/DL — SIGNIFICANT CHANGE UP (ref 7–18)
CALCIUM SERPL-MCNC: 8.3 MG/DL — LOW (ref 8.4–10.5)
CHLORIDE SERPL-SCNC: 111 MMOL/L — HIGH (ref 96–108)
CO2 SERPL-SCNC: 25 MMOL/L — SIGNIFICANT CHANGE UP (ref 22–31)
CREAT SERPL-MCNC: 1 MG/DL — SIGNIFICANT CHANGE UP (ref 0.5–1.3)
CULTURE RESULTS: SIGNIFICANT CHANGE UP
CULTURE RESULTS: SIGNIFICANT CHANGE UP
EGFR: 74 ML/MIN/1.73M2 — SIGNIFICANT CHANGE UP
EOSINOPHIL # BLD AUTO: 0.03 K/UL — SIGNIFICANT CHANGE UP (ref 0–0.5)
EOSINOPHIL NFR BLD AUTO: 0.3 % — SIGNIFICANT CHANGE UP (ref 0–6)
GLUCOSE SERPL-MCNC: 109 MG/DL — HIGH (ref 70–99)
HCT VFR BLD CALC: 39.3 % — SIGNIFICANT CHANGE UP (ref 39–50)
HGB BLD-MCNC: 13 G/DL — SIGNIFICANT CHANGE UP (ref 13–17)
IMM GRANULOCYTES NFR BLD AUTO: 0.9 % — SIGNIFICANT CHANGE UP (ref 0–0.9)
LYMPHOCYTES # BLD AUTO: 0.85 K/UL — LOW (ref 1–3.3)
LYMPHOCYTES # BLD AUTO: 8.2 % — LOW (ref 13–44)
MAGNESIUM SERPL-MCNC: 2.2 MG/DL — SIGNIFICANT CHANGE UP (ref 1.6–2.6)
MCHC RBC-ENTMCNC: 31.9 PG — SIGNIFICANT CHANGE UP (ref 27–34)
MCHC RBC-ENTMCNC: 33.1 GM/DL — SIGNIFICANT CHANGE UP (ref 32–36)
MCV RBC AUTO: 96.3 FL — SIGNIFICANT CHANGE UP (ref 80–100)
MONOCYTES # BLD AUTO: 0.53 K/UL — SIGNIFICANT CHANGE UP (ref 0–0.9)
MONOCYTES NFR BLD AUTO: 5.1 % — SIGNIFICANT CHANGE UP (ref 2–14)
NEUTROPHILS # BLD AUTO: 8.9 K/UL — HIGH (ref 1.8–7.4)
NEUTROPHILS NFR BLD AUTO: 85.4 % — HIGH (ref 43–77)
NRBC # BLD: 0 /100 WBCS — SIGNIFICANT CHANGE UP (ref 0–0)
PHOSPHATE SERPL-MCNC: 2.4 MG/DL — LOW (ref 2.5–4.5)
PLATELET # BLD AUTO: 113 K/UL — LOW (ref 150–400)
POTASSIUM SERPL-MCNC: 3.8 MMOL/L — SIGNIFICANT CHANGE UP (ref 3.5–5.3)
POTASSIUM SERPL-SCNC: 3.8 MMOL/L — SIGNIFICANT CHANGE UP (ref 3.5–5.3)
PROT SERPL-MCNC: 5.2 G/DL — LOW (ref 6–8.3)
RBC # BLD: 4.08 M/UL — LOW (ref 4.2–5.8)
RBC # FLD: 14.2 % — SIGNIFICANT CHANGE UP (ref 10.3–14.5)
SODIUM SERPL-SCNC: 142 MMOL/L — SIGNIFICANT CHANGE UP (ref 135–145)
SPECIMEN SOURCE: SIGNIFICANT CHANGE UP
SPECIMEN SOURCE: SIGNIFICANT CHANGE UP
WBC # BLD: 10.41 K/UL — SIGNIFICANT CHANGE UP (ref 3.8–10.5)
WBC # FLD AUTO: 10.41 K/UL — SIGNIFICANT CHANGE UP (ref 3.8–10.5)

## 2024-10-18 PROCEDURE — 99233 SBSQ HOSP IP/OBS HIGH 50: CPT

## 2024-10-18 PROCEDURE — 93970 EXTREMITY STUDY: CPT | Mod: 26

## 2024-10-18 PROCEDURE — 71250 CT THORAX DX C-: CPT | Mod: 26

## 2024-10-18 PROCEDURE — 99223 1ST HOSP IP/OBS HIGH 75: CPT

## 2024-10-18 PROCEDURE — 73630 X-RAY EXAM OF FOOT: CPT | Mod: 26,LT

## 2024-10-18 RX ADMIN — Medication 0.4 MILLIGRAM(S): at 21:41

## 2024-10-18 RX ADMIN — Medication 25 MILLIGRAM(S): at 05:49

## 2024-10-18 RX ADMIN — SODIUM CHLORIDE 4 MILLILITER(S): 9 INJECTION, SOLUTION INTRAMUSCULAR; INTRAVENOUS; SUBCUTANEOUS at 20:24

## 2024-10-18 RX ADMIN — Medication 25 MILLIGRAM(S): at 17:46

## 2024-10-18 RX ADMIN — PANTOPRAZOLE SODIUM 40 MILLIGRAM(S): 40 TABLET, DELAYED RELEASE ORAL at 05:49

## 2024-10-18 RX ADMIN — HEPARIN SODIUM 5000 UNIT(S): 10000 INJECTION INTRAVENOUS; SUBCUTANEOUS at 05:48

## 2024-10-18 RX ADMIN — Medication 100 MILLIGRAM(S): at 02:29

## 2024-10-18 RX ADMIN — FOLIC ACID 1 MILLIGRAM(S): 1 TABLET ORAL at 17:47

## 2024-10-18 RX ADMIN — Medication 500 MILLIGRAM(S): at 11:16

## 2024-10-18 RX ADMIN — IPRATROPIUM BROMIDE AND ALBUTEROL SULFATE 3 MILLILITER(S): .5; 2.5 SOLUTION RESPIRATORY (INHALATION) at 14:51

## 2024-10-18 RX ADMIN — FOLIC ACID 1 MILLIGRAM(S): 1 TABLET ORAL at 08:19

## 2024-10-18 RX ADMIN — VANCOMYCIN HYDROCHLORIDE 250 MILLIGRAM(S): KIT at 11:15

## 2024-10-18 RX ADMIN — Medication 1 TABLET(S): at 11:16

## 2024-10-18 RX ADMIN — IPRATROPIUM BROMIDE AND ALBUTEROL SULFATE 3 MILLILITER(S): .5; 2.5 SOLUTION RESPIRATORY (INHALATION) at 20:21

## 2024-10-18 RX ADMIN — Medication 20 MILLIGRAM(S): at 21:41

## 2024-10-18 RX ADMIN — SODIUM CHLORIDE 4 MILLILITER(S): 9 INJECTION, SOLUTION INTRAMUSCULAR; INTRAVENOUS; SUBCUTANEOUS at 08:10

## 2024-10-18 RX ADMIN — HEPARIN SODIUM 5000 UNIT(S): 10000 INJECTION INTRAVENOUS; SUBCUTANEOUS at 17:45

## 2024-10-18 RX ADMIN — IPRATROPIUM BROMIDE AND ALBUTEROL SULFATE 3 MILLILITER(S): .5; 2.5 SOLUTION RESPIRATORY (INHALATION) at 08:10

## 2024-10-18 NOTE — PROGRESS NOTE ADULT - PROBLEM SELECTOR PLAN 4
- MR head with Mild soft tissue swelling, no acute intracranial pathology. Chronic small vessel ischemic changes  - continue statin  - holding plavix 2/2 thrombocytopenia  - treat if over 140/90 (goal /80)  - neuro consulted Dr More

## 2024-10-18 NOTE — PROGRESS NOTE ADULT - PROBLEM SELECTOR PLAN 12
pt is from home  PT recs [facility]  f/u doppler, CRISTIAN, & right foot x-ray -- recs by vascular and podiatry  PICC line on Monday, spoke with IR

## 2024-10-18 NOTE — PROGRESS NOTE ADULT - PROBLEM SELECTOR PLAN 9
- likely in the setting of sepsis and HIV  - Plt count 113 -- improving  - No signs of active bleeding  - trend CBC

## 2024-10-18 NOTE — PROGRESS NOTE ADULT - ATTENDING COMMENTS
Patient seen and examined this afternoon and later spoke with Daughter visiting from [place]     83yo m with  PMH of HTN, HD, PAD s/p bilateral LE stents, BPH, HIV, p/w ams following presumed unwitnessed fall amd prolonged unsuspecified  time on floor for up to 3 days possibly.; IN ED, found to be meeting severe sirs/sepsis criteria + in AHRF requiring 3 lpm via nc; admitted with suspected aspiration iso fall, dehydration    Patient found to be bacteremic with Aerococcus viridans; seen by ID being Rx with Vanco and Cefepime; Patient s/p HEATHER yesterday more coherent speech today; Chest still appears congested. No acute distress; denies any new complaints; leg edema appears improved today;     Vital Signs Last 24 Hrs  T(C): 36.4 (18 Oct 2024 23:50), Max: 36.8 (18 Oct 2024 04:42)  T(F): 97.6 (18 Oct 2024 23:50), Max: 98.2 (18 Oct 2024 04:42)  HR: 99 (18 Oct 2024 23:50) (84 - 102)  BP: 122/61 (18 Oct 2024 23:50) (113/59 - 139/76)  RR: 20 (18 Oct 2024 23:50) (18 - 20)  SpO2: 98% (18 Oct 2024 23:50) (92% - 100%) room air    P/E:  elderly male, appear chronically ill and somewhat disheveled,   Neuro: AAO x 3; improved mentation  Chest : B/L Coarse breath sounds  CVS: S1S2 present  Abdomen, soft, BS+, NT; mildly distended  Ext: significant edema of RUE and RLE with skin breakdown, right (may be portal of entry for bacteremia)    Labs:                               13.0   10.41 )-----------( 113      ( 18 Oct 2024 07:10 )             39.3     10-18  142  |  111[H]  |  16  ----------------------------<  109[H]  3.8   |  25  |  1.00  Ca    8.3[L]      18 Oct 2024 07:10  Phos  2.4     10-18  Mg     2.2     10-18  TPro  5.2[L]  /  Alb  1.8[L]  /  TBili  0.6  /  DBili  x   /  AST  56[H]  /  ALT  46  /  AlkPhos  86  10-18    CT Chest No Cont (10.18.24 @ 16:09) >    IMPRESSION:  Bilateral pleural effusions. Cardiomegaly.  Pulmonary edema with probable pneumonia involving right middle lobe and scattered opacities bilateral lower lobes.                  Folate, Serum (10.14.24 @ 06:40) Folate, Serum: <2.0: Test Repeated ng/mL  Thyroid Stimulating Hormone, Serum: 2.25 uU/mL (10.14.24 @ 06:40)     Lactate, Blood (10.14.24 @ 06:40) Lactate, Blood: 1.5 mmol/L  Lactate, Blood (10.12.24 @ 19:00) Lactate, Blood: 2.9:    Culture - Blood (10.12.24 @ 15:15)   Growth in aerobic and anaerobic bottles: Aerococcus viridans   Growth in anaerobic bottle: Aerococcus urinae     Xray Chest 1 View AP/PA (10.14.24 @ 09:00)   IMPRESSION: Increasing bibasilar fluid accumulations.    CT Head No Cont (10.12.24 @ 18:44)   Impression: Normal CT of the brain.    A/P:  Sepsis with Bacteremia due to Aerococcus viridans etiology ?LE Ulcers   concern for Endocarditis ruled out  A. Fib with RVR ppt by severe infection much improved HR  Acute Hypoxic Resp. failure with Suspected Aspiration Pneumonia  Right facial droop and Right hemiparesis concern for stroke  Severe folate deficiency  B/L LE edema etiology ?? Venous insufficiency   Lactic acidosis possibly starvation ketoacidosis  Hypernatremia secondary to dehydration resolved  Rhabdomyolysis due to fall and prolonged immobilization  Hx PAD with B/L stents  Elevated troponin likely demand ischemia    Plan:  Continue IV Vanco and Ceftriaxone for now; Vanco trough elevated; Hold dose and resume at reduced dose; ID follow up  HEATHER negative for Endocarditis; Likely to treat current bacteremia for 2 weeks   Cardiology f/u  better controlled HR s/p Digoxin and resuming low dose Metoprolol 25 mg q 12 hrs; if HR remain elevated increase to 25 mg q 8 hrs  Right hemiparesis, r/o CNS emboli vs CNS mass; Neuro eval appreciated; d/w Dr. More; MRI with and without contrast given Hx HIV  Lasix 20 mg IV x one more dose today; Repeat CXR AM; if remain congested; get Non contrast CT tomorrow and Pulmonary consult  Repeat Blood Cx testing; so far negative;   Nutrition consultation; Add Ensure BID  Follow up HIV VL and CD4, resume ARV,   PT/ OT eval; will need [facility] placement; Daughter has choices already  Social work/ Case mgmt consult for disposition once medically stable; critically ill at this time although improved;   .  Discussed with Daughter at bedside and reviewed findings and plan of care as above  Discussed with SMILEY Faustin and RN

## 2024-10-18 NOTE — PROGRESS NOTE ADULT - SUBJECTIVE AND OBJECTIVE BOX
Interval: Patient seen resting comfortably at bedside in no acute distress. No acute overnight events. There is no drainage or strikethrough on his dressings. Patient denies any pain, numbness, burning or tingling to the feet B/L. He denies constitutional symptoms at this time.     Podiatry consulted for 84 year old male with R calf wound and L 2nd and 3rd digit wound. PMHx of HTN, PVD, BPH, HIV. The patient is accompanied by his daughter who explains that patient was admitted on 10/12 after she found her father on the floor for likely 3 days covered in urine/feces. States her dad was not answering he phone calls so she traveled from [place] and found that he had fallen in his apartment. The patient states he developed L 2nd and 3rd digit wound before he sustained fall. He states he injured the foot by accident when he closed a door over his foot. The daughter and the patient are unsure how the father developed the leg wound, but daughter believes it may be due to the fall he sustained.  The patient complains of the chills and states he always feels like his body is cold. He denies other constitutional symptoms, such as fever, nausea, or vomiting.     HPI:  84-year-old man from home with hypertension, PVD, BPH, HIV (on Genvoya??) coming with his daughter after she found him on the floor.  She had been trying to get in touch with him for 3 days but was unable so came up to [place] from [place], where she had recently moved, and found him on the floor covered in feces and urine.  He was confused at the time.  He does not recall details about how he got to the floor.  Per daughter, he is independent and mentate as well at baseline. On exam, he appears to be A&O x 3 but he is very difficult to understand.  He appears very dry and sounds like he is swallowing his tongue.  His daughter has difficulty understanding him as well.    Exam is notable for very dry appearance, garbled speech, rhonchi on all anterior lung fields, saturating 88% on room air and requiring 3 L nasal cannula, bilateral lower extremity pitting edema with old and new wounds on the legs, and an abrasion on the forehead.     ED Course    Vitals: HRmax 115, O2 88% on RA, requiring 3L NC  Labs: WBC 13, plt 82, trops 550>538, lactate 2.3>2.9, BUN/Cr 39/1.26, gluc 64, , UA with RBC, granular casts, protein  Intervention: s/p zosyn, 2.5L IVF  Consults:   Images: CTH wnl; CXR with bl LL consolidations on wet read        (12 Oct 2024 21:33)      Medications albuterol/ipratropium for Nebulization 3 milliLiter(s) Nebulizer every 6 hours  aluminum hydroxide/magnesium hydroxide/simethicone Suspension 30 milliLiter(s) Oral every 4 hours PRN  ascorbic acid 500 milliGRAM(s) Oral daily  benzonatate 100 milliGRAM(s) Oral every 8 hours PRN  cefTRIAXone   IVPB 2000 milliGRAM(s) IV Intermittent every 24 hours  folic acid 1 milliGRAM(s) Oral <User Schedule>  guaiFENesin  milliGRAM(s) Oral every 12 hours PRN  heparin   Injectable 5000 Unit(s) SubCutaneous every 12 hours  influenza  Vaccine (HIGH DOSE) 0.5 milliLiter(s) IntraMuscular once  melatonin 3 milliGRAM(s) Oral at bedtime PRN  metoprolol tartrate 25 milliGRAM(s) Oral two times a day  multivitamin 1 Tablet(s) Oral daily  ondansetron Injectable 4 milliGRAM(s) IV Push every 8 hours PRN  pantoprazole    Tablet 40 milliGRAM(s) Oral before breakfast  rosuvastatin 20 milliGRAM(s) Oral at bedtime  sodium chloride 3%  Inhalation 4 milliLiter(s) Inhalation every 12 hours  tamsulosin 0.4 milliGRAM(s) Oral at bedtime  vancomycin  IVPB 1000 milliGRAM(s) IV Intermittent every 24 hours    FHNo pertinent family history in first degree relatives    ,   PMHHIV (human immunodeficiency virus infection)    Umbilical hernia    Essential hypertension    Cardiomegaly    Elevated PSA    Urge incontinence    PVD (peripheral vascular disease)    Shingles    Enlarged prostate    Unilateral inguinal hernia, without obstruction or gangrene, not specified as recurrent       PSHS/P hernia repair    History of cystoscopy    PVD (peripheral vascular disease)    S/P cataract surgery        Labs                          13.0   10.41 )-----------( 113      ( 18 Oct 2024 07:10 )             39.3      10-18    142  |  111[H]  |  16  ----------------------------<  109[H]  3.8   |  25  |  1.00    Ca    8.3[L]      18 Oct 2024 07:10  Phos  2.4     10-18  Mg     2.2     10-18    TPro  5.2[L]  /  Alb  1.8[L]  /  TBili  0.6  /  DBili  x   /  AST  56[H]  /  ALT  46  /  AlkPhos  86  10-18     Vital Signs Last 24 Hrs  T(C): 36.3 (18 Oct 2024 08:18), Max: 37 (17 Oct 2024 11:11)  T(F): 97.3 (18 Oct 2024 08:18), Max: 98.6 (17 Oct 2024 11:11)  HR: 84 (18 Oct 2024 08:18) (84 - 102)  BP: 113/59 (18 Oct 2024 08:18) (113/59 - 139/76)  BP(mean): --  RR: 18 (18 Oct 2024 08:18) (18 - 18)  SpO2: 97% (18 Oct 2024 08:18) (93% - 99%)    Parameters below as of 18 Oct 2024 08:18  Patient On (Oxygen Delivery Method): nasal cannula  O2 Flow (L/min): 2            WBC Count: 10.41 K/uL (10-18-24 @ 07:10)      PHYSICAL EXAM  LE Focused:    Vasc:  DP/PT pulses non-palpable B/L. 2+ pitting edema to B/L LE. TG: warm to cool, No varicosities  Derm: 2.7 x 1.2 right leg wound with fibrotic wound base. Submet 5 callus to R foot. Small blisters dorsal to the PIPJ of L 2nd and 3rd digits. No IDM. No purulence. No soft tissue crepitus. No fluctuance. No clinical signs of infection.   Neuro: Protective sensation is intact  MSK: Deferred    CULTURES: Culture Results:   Commensal javy consistent with body site (10-14 @ 06:14)  Culture Results:   No growth at 24 hours (10-13 @ 13:45)  Culture Results:   No growth at 24 hours (10-13 @ 13:30)  Culture Results:   Growth in aerobic bottle: Staphylococcus epidermidis  "Susceptibilities not performed"  Growth in aerobic and anaerobic bottles: Aerococcus viridans  Growth in anaerobic bottle: Aerococcus urinae  Isolates of Aerococcus spp. are predictably susceptible to penicillin,  ampicillin, and vancomycin. All isolates are resistant to sulfonamides. (10-12 @ 15:20)  Culture Results:   Growth in aerobic and anaerobic bottles: Aerococcus viridans  Growth in anaerobic bottle: Aerococcus urinae  Isolates of Aerococcus spp. are predictably susceptible to penicillin,  ampicillin, and vancomycin. All isolates are resistant to sulfonamides.  Direct identification is available within approximately 3-5  hours either by Blood Panel Multiplexed PCR or Direct  MALDI-TOF. Details: [email or web address] (10-12 @ 15:15)

## 2024-10-18 NOTE — CONSULT NOTE ADULT - ASSESSMENT
83yo man smoker w/ HIV (on ART) admitted for severe sepsis and bacteremia with Aerococcus, possibly from SSTI with concern for concomitant PNA and c/c/b AHRF.    #AHRF  #PNA  #Bilateral pleural effusions w/ pulmonary edema    Recommend:  - cont broad spec ABx, defer to ID for coverage given bacteremia and multiple sources  - wean supp O2 as tolerated to maintain normoxia  - airway clearance with duonebs, trial of hypertonic saline nebs, ?chest vest vs. chest PT  - mobilize OOBTC and ambulate as tolerated daily  - consider RVP if new fever  - CT chest reviewed, pulmonary edema and bilateral effusions suggest there could be a cardiogenic source of fluid overload  --> diuresis per primary team/cardiology for goal neg fluid balance; monitoring I/Os would be helpful  - doubt PJP PNA if pt has been compliant with ART, unable to ascertain this from speaking directly to pt with family members present

## 2024-10-18 NOTE — CONSULT NOTE ADULT - TIME BILLING
- personally reviewed pt's labs, VS/flowsheets, pertinent imaging, and consultant notes  - bedside SpO2 measurement to determine supplemental O2 needs  - general pulm hx/exam  - medication reconciliation  - coordination of care with primary team
- Review of records, telemetry, vital signs and daily labs.   - General and cardiovascular physical examination.  - Generation of cardiovascular treatment plan and completion of note .  - Coordination of care.      Patient was seen and examined by me on  10/14/24,interim events noted,labs and radiology studies reviewed.  Ernesto Daniel MD,FACC.  [id number] [facility].  [phone]

## 2024-10-18 NOTE — CONSULT NOTE ADULT - CONSULT REQUESTED DATE/TIME
15-Oct-2024 17:08
14-Oct-2024
15-Oct-2024 18:24
16-Oct-2024 22:10
14-Oct-2024 17:19
14-Oct-2024 23:07
18-Oct-2024 08:00

## 2024-10-18 NOTE — CONSULT NOTE ADULT - CONSULT REASON
Right-sided weakness
Sepsis/ Bacteremia
nonpalp pulses
R posterior calf wound  L 2nd, 3rd digit wound
cardiac arythmia
Right lateral and posterior leg ulcers  Left 2nd and third toe blisters  Coccyx DTI  Right ischium DTI  Scrotum IAD  Left ischium pressure ulcer unstegable
AHRF, sepsis, PNA

## 2024-10-18 NOTE — PROGRESS NOTE ADULT - ASSESSMENT
A:   L 2nd, 3rd digit wound  R leg ulcer    P:   Patient evaluated and chart reviewed  Leukocytosis absent, vitals stable   B/L wounds with no clinical signs of acute infection  R leg wound dressed with Santyl/Allevyn  L toe wounds painted with Betadine - healing nicely   ACE wrap compression B/L for edema   Appreciate vasc recs - venous duplex pending  Patient stable from a podiatry standpoint   Discussed with attending Dr. Lerner     Upon discharge, patient can follow up with Dr. Lazaro Lerner, DPM:   Dr. Lazaro Lerner  [address] [facility]  Please call [phone] to make an appointment    WCO: Santyl/Allevyn Pad to R leg wound, ACE wrap compression B/L M/W/F

## 2024-10-18 NOTE — PROGRESS NOTE ADULT - PROBLEM SELECTOR PLAN 2
- 2/2 pneumonia, bacteremia (Aerococcus viridans, Aerococcus urinae), multiple pressure ulcer  - Bcx (10/12)- aerococcus viridans, Aerococcus urinae  - Bcx(10/13 and 10/15)- NGTD  - TTE: EF 20-25%; Global LV hypokinesis. Severe left ventricular hypertrophy.  - HEATHER negative for vegetations with reduced EF  - c/w ceftriaxone and vancomycin  - ID Dr Andres reccs: if HEATHER negative then will give short course therapy of 2 weeks of IV antibiotics (Vanco)  - pt will need a PICC line for vancomycin until 10/29/24 (2 weeks after the Bcx NGTD) 03-Feb-2019

## 2024-10-18 NOTE — PROGRESS NOTE ADULT - SUBJECTIVE AND OBJECTIVE BOX
PATIENT SEEN AND EXAMINED BY ADEN ZHANG M.D. ON :- 10/18/24  DATE OF SERVICE:   10/18/24          Interim events noted,Labs ,Radiological studies and Cardiology tests reviewed .    Patient is a 84y old  Male who presents with a chief complaint of sepsis (18 Oct 2024 21:52)      HPI:  84-year-old man from home with hypertension, PVD, BPH, HIV (on Genvoya??) coming with his daughter after she found him on the floor.  She had been trying to get in touch with him for 3 days but was unable so came up to [place] from [place], where she had recently moved, and found him on the floor covered in feces and urine.  He was confused at the time.  He does not recall details about how he got to the floor.  Per daughter, he is independent and mentate as well at baseline. On exam, he appears to be A&O x 3 but he is very difficult to understand.  He appears very dry and sounds like he is swallowing his tongue.  His daughter has difficulty understanding him as well.    Exam is notable for very dry appearance, garbled speech, rhonchi on all anterior lung fields, saturating 88% on room air and requiring 3 L nasal cannula, bilateral lower extremity pitting edema with old and new wounds on the legs, and an abrasion on the forehead.     ED Course    Vitals: HRmax 115, O2 88% on RA, requiring 3L NC  Labs: WBC 13, plt 82, trops 550>538, lactate 2.3>2.9, BUN/Cr 39/1.26, gluc 64, , UA with RBC, granular casts, protein  Intervention: s/p zosyn, 2.5L IVF  Consults:   Images: CTH wnl; CXR with bl LL consolidations on wet read        (12 Oct 2024 21:33)      PAST MEDICAL & SURGICAL HISTORY:  HIV (human immunodeficiency virus infection)      Umbilical hernia      Essential hypertension      Cardiomegaly      Elevated PSA      Urge incontinence      PVD (peripheral vascular disease)      Shingles      Enlarged prostate      Unilateral inguinal hernia, without obstruction or gangrene, not specified as recurrent      S/P hernia repair  right inguinal - 2013      History of cystoscopy  with photovaporization, green light laser lithotripsy 2016      PVD (peripheral vascular disease)  bilateral leg stents 2020      S/P cataract surgery          PREVIOUS DIAGNOSTIC TESTING:      ECHO  FINDINGS:    STRESS  FINDINGS:    CATHETERIZATION  FINDINGS:    MEDICATIONS  (STANDING):  albuterol/ipratropium for Nebulization 3 milliLiter(s) Nebulizer every 6 hours  ascorbic acid 500 milliGRAM(s) Oral daily  cefTRIAXone   IVPB 2000 milliGRAM(s) IV Intermittent every 24 hours  folic acid 1 milliGRAM(s) Oral <User Schedule>  heparin   Injectable 5000 Unit(s) SubCutaneous every 12 hours  influenza  Vaccine (HIGH DOSE) 0.5 milliLiter(s) IntraMuscular once  metoprolol tartrate 25 milliGRAM(s) Oral two times a day  multivitamin 1 Tablet(s) Oral daily  pantoprazole    Tablet 40 milliGRAM(s) Oral before breakfast  rosuvastatin 20 milliGRAM(s) Oral at bedtime  sodium chloride 3%  Inhalation 4 milliLiter(s) Inhalation every 12 hours  tamsulosin 0.4 milliGRAM(s) Oral at bedtime  vancomycin  IVPB 1000 milliGRAM(s) IV Intermittent every 24 hours    MEDICATIONS  (PRN):  aluminum hydroxide/magnesium hydroxide/simethicone Suspension 30 milliLiter(s) Oral every 4 hours PRN Dyspepsia  benzonatate 100 milliGRAM(s) Oral every 8 hours PRN Cough  guaiFENesin  milliGRAM(s) Oral every 12 hours PRN Cough  melatonin 3 milliGRAM(s) Oral at bedtime PRN Insomnia  ondansetron Injectable 4 milliGRAM(s) IV Push every 8 hours PRN Nausea and/or Vomiting      FAMILY HISTORY:  No pertinent family history in first degree relatives        SOCIAL HISTORY:    CIGARETTES:    ALCOHOL:    REVIEW OF SYSTEMS:  CONSTITUTIONAL: No fever, weight loss, or fatigue  EYES: No eye pain, visual disturbances, or discharge  ENMT:  No difficulty hearing, tinnitus, vertigo; No sinus or throat pain  NECK: No pain or stiffness  RESPIRATORY: No cough, wheezing, chills or hemoptysis; No shortness of breath  CARDIOVASCULAR: No chest pain, palpitations, dizziness, or leg swelling  GASTROINTESTINAL: No abdominal or epigastric pain. No nausea, vomiting, or hematemesis; No diarrhea or constipation. No melena or hematochezia.  GENITOURINARY: No dysuria, frequency, hematuria, or incontinence  NEUROLOGICAL: No headaches, memory loss, loss of strength, numbness, or tremors  SKIN: No itching, burning, rashes, or lesions   LYMPH NODES: No enlarged glands  ENDOCRINE: No heat or cold intolerance; No hair loss  MUSCULOSKELETAL: No joint pain or swelling; No muscle, back, or extremity pain  PSYCHIATRIC: No depression, anxiety, mood swings, or difficulty sleeping  HEME/LYMPH: No easy bruising, or bleeding gums  ALLERY AND IMMUNOLOGIC: No hives or eczema    Vital Signs Last 24 Hrs  T(C): 36.4 (18 Oct 2024 23:50), Max: 36.8 (18 Oct 2024 04:42)  T(F): 97.6 (18 Oct 2024 23:50), Max: 98.2 (18 Oct 2024 04:42)  HR: 99 (18 Oct 2024 23:50) (84 - 102)  BP: 122/61 (18 Oct 2024 23:50) (113/59 - 139/76)  BP(mean): --  RR: 20 (18 Oct 2024 23:50) (18 - 20)  SpO2: 98% (18 Oct 2024 23:50) (92% - 100%)    Parameters below as of 18 Oct 2024 23:50  Patient On (Oxygen Delivery Method): room air          PHYSICAL EXAM:  GENERAL: NAD, well-groomed, well-developed  HEAD:  Atraumatic, Normocephalic  EYES: EOMI, PERRLA, conjunctiva and sclera clear  ENMT: No tonsillar erythema, exudates, or enlargement; Moist mucous membranes, Good dentition, No lesions  NECK: Supple, No JVD, Normal thyroid  NERVOUS SYSTEM:  Alert & Oriented X3, Good concentration; Motor Strength 5/5 B/L upper and lower extremities; DTRs 2+ intact and symmetric  CHEST/LUNG: Clear to percussion bilaterally; No rales, rhonchi, wheezing, or rubs  HEART: Regular rate and rhythm; No murmurs, rubs, or gallops  ABDOMEN: Soft, Nontender, Nondistended; Bowel sounds present  EXTREMITIES:  2+ Peripheral Pulses, No clubbing, cyanosis, or edema  LYMPH: No lymphadenopathy noted  SKIN: No rashes or lesions      INTERPRETATION OF TELEMETRY:    ECG:    [facility]     LABS:                        13.0   10.41 )-----------( 113      ( 18 Oct 2024 07:10 )             39.3     10-18    142  |  111[H]  |  16  ----------------------------<  109[H]  3.8   |  25  |  1.00    Ca    8.3[L]      18 Oct 2024 07:10  Phos  2.4     10-18  Mg     2.2     10-18    TPro  5.2[L]  /  Alb  1.8[L]  /  TBili  0.6  /  DBili  x   /  AST  56[H]  /  ALT  46  /  AlkPhos  86  10-18          Urinalysis Basic - ( 18 Oct 2024 07:10 )    Color: x / Appearance: x / SG: x / pH: x  Gluc: 109 mg/dL / Ketone: x  / Bili: x / Urobili: x   Blood: x / Protein: x / Nitrite: x   Leuk Esterase: x / RBC: x / WBC x   Sq Epi: x / Non Sq Epi: x / Bacteria: x      Lipid Panel:   I&O's Summary    17 Oct 2024 07:01  -  18 Oct 2024 07:00  --------------------------------------------------------  IN: 450 mL / OUT: 0 mL / NET: 450 mL    18 Oct 2024 07:01  -  19 Oct 2024 00:16  --------------------------------------------------------  IN: 100 mL / OUT: 0 mL / NET: 100 mL        RADIOLOGY & ADDITIONAL STUDIES:    < from: HEATHER W or WO Ultrasound Enhancing Agent (10.16.24 @ 11:49) >      < end of copied text >

## 2024-10-18 NOTE — CONSULT NOTE ADULT - SUBJECTIVE AND OBJECTIVE BOX
PULMONARY SERVICE INITIAL CONSULT NOTE    HPI:  84-year-old man from home with hypertension, PVD, BPH, HIV (on Genvoya??) coming with his daughter after she found him on the floor.  She had been trying to get in touch with him for 3 days but was unable so came up to [place] from [place], where she had recently moved, and found him on the floor covered in feces and urine.  He was confused at the time.  He does not recall details about how he got to the floor.  Per daughter, he is independent and mentate as well at baseline. On exam, he appears to be A&O x 3 but he is very difficult to understand.  He appears very dry and sounds like he is swallowing his tongue.  His daughter has difficulty understanding him as well.    Exam is notable for very dry appearance, garbled speech, rhonchi on all anterior lung fields, saturating 88% on room air and requiring 3 L nasal cannula, bilateral lower extremity pitting edema with old and new wounds on the legs, and an abrasion on the forehead.     REVIEW OF SYSTEMS:  Constitutional: No fever, weight loss or fatigue  Eyes: No eye pain, visual disturbances, or discharge  ENMT:  No difficulty hearing, tinnitus, vertigo; No sinus or throat pain  Neck: No pain, stiffness or neck swelling  Respiratory: see HPI  Cardiovascular: No chest pain, palpitations, dizziness or leg swelling  Gastrointestinal: No abdominal or epigastric pain. No nausea, vomiting or hematemesis; No diarrhea or constipation. No melena or hematochezia.  Genitourinary: No dysuria, frequency, hematuria or incontinence  Neurological: No headaches, memory loss, loss of strength, numbness or tremors  Skin: No itching, burning, rashes or lesions   Lymph Nodes: No enlarged glands  Endocrine: No heat or cold intolerance; No hair loss  Musculoskeletal: No joint pain or swelling; No muscle, back or extremity pain  Psychiatric: No depression, anxiety, mood swings or difficulty sleeping  Heme/Lymph: No easy bruising or bleeding gums  Allergy and Immunologic: No hives or eczema    PAST MEDICAL & SURGICAL HISTORY:  HIV (human immunodeficiency virus infection)    Umbilical hernia    Essential hypertension    Cardiomegaly      Elevated PSA      Urge incontinence      PVD (peripheral vascular disease)      Shingles      Enlarged prostate      Unilateral inguinal hernia, without obstruction or gangrene, not specified as recurrent      S/P hernia repair  right inguinal - 2013      History of cystoscopy  with photovaporization, green light laser lithotripsy 2016      PVD (peripheral vascular disease)  bilateral leg stents 2020    S/P cataract surgery    FAMILY HISTORY:  No pertinent family history in first degree relatives    SOCIAL HISTORY:  Smoking Status: [X] Current, [ ] Former, [ ] Never  Pack Years:    MEDICATIONS:  Pulmonary:  albuterol/ipratropium for Nebulization 3 milliLiter(s) Nebulizer every 6 hours  benzonatate 100 milliGRAM(s) Oral every 8 hours PRN  guaiFENesin  milliGRAM(s) Oral every 12 hours PRN  sodium chloride 3%  Inhalation 4 milliLiter(s) Inhalation every 12 hours    Antimicrobials:  cefTRIAXone   IVPB 2000 milliGRAM(s) IV Intermittent every 24 hours  vancomycin  IVPB 1000 milliGRAM(s) IV Intermittent every 24 hours    Anticoagulants:  heparin   Injectable 5000 Unit(s) SubCutaneous every 12 hours    Onc:    GI/:  aluminum hydroxide/magnesium hydroxide/simethicone Suspension 30 milliLiter(s) Oral every 4 hours PRN  pantoprazole    Tablet 40 milliGRAM(s) Oral before breakfast  tamsulosin 0.4 milliGRAM(s) Oral at bedtime    Endocrine:  rosuvastatin 20 milliGRAM(s) Oral at bedtime    Cardiac:  metoprolol tartrate 25 milliGRAM(s) Oral two times a day    Other Medications:  ascorbic acid 500 milliGRAM(s) Oral daily  folic acid 1 milliGRAM(s) Oral <User Schedule>  influenza  Vaccine (HIGH DOSE) 0.5 milliLiter(s) IntraMuscular once  melatonin 3 milliGRAM(s) Oral at bedtime PRN  multivitamin 1 Tablet(s) Oral daily  ondansetron Injectable 4 milliGRAM(s) IV Push every 8 hours PRN    Allergies    No Known Allergies    Intolerances    Vital Signs Last 24 Hrs  T(C): 36.6 (18 Oct 2024 20:30), Max: 36.8 (18 Oct 2024 04:42)  T(F): 97.8 (18 Oct 2024 20:30), Max: 98.2 (18 Oct 2024 04:42)  HR: 98 (18 Oct 2024 20:30) (84 - 102)  BP: 137/89 (18 Oct 2024 20:30) (113/59 - 139/76)  BP(mean): --  RR: 20 (18 Oct 2024 20:30) (18 - 20)  SpO2: 96% (18 Oct 2024 20:30) (92% - 100%)    Parameters below as of 18 Oct 2024 20:30  Patient On (Oxygen Delivery Method): room air    10-17 @ 07:01  -  10-18 @ 07:00  --------------------------------------------------------  IN: 450 mL / OUT: 0 mL / NET: 450 mL    10-18 @ 07:01  -  10-18 @ 21:52  --------------------------------------------------------  IN: 100 mL / OUT: 0 mL / NET: 100 mL    PHYSICAL EXAM:  Constitutional: chronically ill-appearing man in bed  Head: atraumatic  EENT: PERRL, anicteric sclera; oropharynx clear, MMM  Neck: supple, no appreciable JVD  Respiratory: diminished bibasilar BS  Cardiovascular: +S1/S2, RRR  Gastrointestinal: soft, NT/ND  Extremities: WWP; no edema, clubbing or cyanosis  Vascular: 2+ radial pulses B/L  Neurological: awake and alert; ARMIJO    LABS:  CBC Full  -  ( 18 Oct 2024 07:10 )  WBC Count : 10.41 K/uL  RBC Count : 4.08 M/uL  Hemoglobin : 13.0 g/dL  Hematocrit : 39.3 %  Platelet Count - Automated : 113 K/uL  Mean Cell Volume : 96.3 fl  Mean Cell Hemoglobin : 31.9 pg  Mean Cell Hemoglobin Concentration : 33.1 gm/dL  Auto Neutrophil # : 8.90 K/uL  Auto Lymphocyte # : 0.85 K/uL  Auto Monocyte # : 0.53 K/uL  Auto Eosinophil # : 0.03 K/uL  Auto Basophil # : 0.01 K/uL  Auto Neutrophil % : 85.4 %  Auto Lymphocyte % : 8.2 %  Auto Monocyte % : 5.1 %  Auto Eosinophil % : 0.3 %  Auto Basophil % : 0.1 %    10-18    142  |  111[H]  |  16  ----------------------------<  109[H]  3.8   |  25  |  1.00    Ca    8.3[L]      18 Oct 2024 07:10  Phos  2.4     10-18    Mg     2.2     10-18    TPro  5.2[L]  /  Alb  1.8[L]  /  TBili  0.6  /  DBili  x   /  AST  56[H]  /  ALT  46  /  AlkPhos  86  10-18  Urinalysis Basic - ( 18 Oct 2024 07:10 )    Color: x / Appearance: x / SG: x / pH: x  Gluc: 109 mg/dL / Ketone: x  / Bili: x / Urobili: x   Blood: x / Protein: x / Nitrite: x   Leuk Esterase: x / RBC: x / WBC x   Sq Epi: x / Non Sq Epi: x / Bacteria: x    RADIOLOGY & ADDITIONAL STUDIES:  < from: CT Chest No Cont (10.18.24 @ 16:09) >  IMPRESSION:  Bilateral pleural effusions.  Cardiomegaly.  Pulmonary edema with probable pneumonia involving right middle lobe and   scattered opacities bilateral lower lobes.

## 2024-10-18 NOTE — PROGRESS NOTE ADULT - PROBLEM SELECTOR PLAN 1
- p/w O2 88% on room air; secondary to suspected aspiration pneumonia  - CXR (10/12)- Hazy bibasilar opacities suggestive of multifocal pneumonia.  - Cxr (10/14) increased bibasilar fluid accumulation  - s/p Lasix IVP -- CXR 10/17: no gross interval change on B/L congestive changes and pleural effusions  - c/w ceftriaxone and vancomycin  - completed azithromycin  - continue Chest PT, Duoneb & incentive spirometer  - wean off O2 to maintain SPO2 >92% -- now on room air  - SLP recs Minced & Moist solids w/ thin liquids  - pulm consulted (Dr eMhta) -- F/U CT chest

## 2024-10-18 NOTE — PROGRESS NOTE ADULT - SUBJECTIVE AND OBJECTIVE BOX
NP Note discussed with  Primary Attending    Patient is a 84y old  Male who presents with a chief complaint of sepsis (18 Oct 2024 10:36)      INTERVAL HPI/OVERNIGHT EVENTS:  84-year-old man from home with hypertension, PVD, BPH, HIV. Patient baseline A+Ox3. Patient present to ED lethargic after being found by daughter on floor covered in feces and urine. Patient admitted telemetry for sepsis, afib with RVR & Acute hypoxic respiratory failure secondary to pneumonia. CT head no acute hemorrhage or infarct, with microvascular changes and volume loss. Chest x-ray with moderate left loculated effusion. Pt found to have bacteremia with blood culture resulting Staphylococcus epidermidis-likely contaminate, Aerococcus viridans, Aerococcus urinae. Repeat blood culture on 10/15 negative. ID following, with HEATHER negative, will need 2 weeks of abx course. Patient treated with Rocephin and Vancomycin. Patient with multiple wounds/pressure ulcers, Wound care followed. Podiatry also following.    Cardiology following. Echo showed with reduced EF 20-25%, global LV hypokinesis. G1DD, normal pulmonary artery pressure, severe LV hypertrophy. HEATHER negative for vegetations with moderate pulm HTN  Vascular following, F/U doppler studies, CRISTIAN  Neuro also following for right hemiparesis, MR brain with Mild soft tissue swelling, no acute pathology. Chronic small vessel ischemic changes    PT recs [facility]    MEDICATIONS  (STANDING):  albuterol/ipratropium for Nebulization 3 milliLiter(s) Nebulizer every 6 hours  ascorbic acid 500 milliGRAM(s) Oral daily  cefTRIAXone   IVPB 2000 milliGRAM(s) IV Intermittent every 24 hours  folic acid 1 milliGRAM(s) Oral <User Schedule>  heparin   Injectable 5000 Unit(s) SubCutaneous every 12 hours  influenza  Vaccine (HIGH DOSE) 0.5 milliLiter(s) IntraMuscular once  metoprolol tartrate 25 milliGRAM(s) Oral two times a day  multivitamin 1 Tablet(s) Oral daily  pantoprazole    Tablet 40 milliGRAM(s) Oral before breakfast  rosuvastatin 20 milliGRAM(s) Oral at bedtime  sodium chloride 3%  Inhalation 4 milliLiter(s) Inhalation every 12 hours  tamsulosin 0.4 milliGRAM(s) Oral at bedtime  vancomycin  IVPB 1000 milliGRAM(s) IV Intermittent every 24 hours    MEDICATIONS  (PRN):  aluminum hydroxide/magnesium hydroxide/simethicone Suspension 30 milliLiter(s) Oral every 4 hours PRN Dyspepsia  benzonatate 100 milliGRAM(s) Oral every 8 hours PRN Cough  guaiFENesin  milliGRAM(s) Oral every 12 hours PRN Cough  melatonin 3 milliGRAM(s) Oral at bedtime PRN Insomnia  ondansetron Injectable 4 milliGRAM(s) IV Push every 8 hours PRN Nausea and/or Vomiting      __________________________________________________  REVIEW OF SYSTEMS:    CONSTITUTIONAL: No fever,   EYES: no acute visual disturbances  NECK: No pain or stiffness  RESPIRATORY: + cough; No shortness of breath  CARDIOVASCULAR: No chest pain, no palpitations  GASTROINTESTINAL: No pain. No nausea or vomiting; No diarrhea   NEUROLOGICAL: No headache or numbness, no tremors  MUSCULOSKELETAL: No joint pain, no muscle pain  GENITOURINARY: no dysuria, no frequency, no hesitancy  ALL OTHER  ROS negative        Vital Signs Last 24 Hrs  T(C): 36.3 (18 Oct 2024 11:22), Max: 37 (17 Oct 2024 15:48)  T(F): 97.3 (18 Oct 2024 11:22), Max: 98.6 (17 Oct 2024 15:48)  HR: 99 (18 Oct 2024 11:22) (84 - 102)  BP: 129/68 (18 Oct 2024 11:22) (113/59 - 139/76)  BP(mean): --  RR: 20 (18 Oct 2024 11:22) (18 - 20)  SpO2: 94% (18 Oct 2024 11:22) (93% - 99%)    Parameters below as of 18 Oct 2024 11:22  Patient On (Oxygen Delivery Method): nasal cannula  O2 Flow (L/min): 2      ________________________________________________  PHYSICAL EXAM:  GENERAL: NAD  HEENT: Normocephalic; moist mucous membranes;   NECK : supple  CHEST/LUNG: B/L LL rales   HEART: S1 S2  regular;  ABDOMEN: Soft, Nontender, Nondistended; Bowel sounds present  EXTREMITIES: no cyanosis; + edema; no calf tenderness  SKIN: warm and dry; no rash; + RLE wound  NERVOUS SYSTEM:  Awake and alert; Oriented to place, person and time    _________________________________________________  LABS:                        13.0   10.41 )-----------( 113      ( 18 Oct 2024 07:10 )             39.3     10-18    142  |  111[H]  |  16  ----------------------------<  109[H]  3.8   |  25  |  1.00    Ca    8.3[L]      18 Oct 2024 07:10  Phos  2.4     10-18  Mg     2.2     10-18    TPro  5.2[L]  /  Alb  1.8[L]  /  TBili  0.6  /  DBili  x   /  AST  56[H]  /  ALT  46  /  AlkPhos  86  10-18      Urinalysis Basic - ( 18 Oct 2024 07:10 )    Color: x / Appearance: x / SG: x / pH: x  Gluc: 109 mg/dL / Ketone: x  / Bili: x / Urobili: x   Blood: x / Protein: x / Nitrite: x   Leuk Esterase: x / RBC: x / WBC x   Sq Epi: x / Non Sq Epi: x / Bacteria: x      CAPILLARY BLOOD GLUCOSE            RADIOLOGY & ADDITIONAL TESTS:  < from: MR Head w/wo IV Cont (10.17.24 @ 16:46) >    ACC: [id number] EXAM:  MR BRAIN WAW IC   ORDERED BY: ERICKA SHEFFIELD     PROCEDURE DATE:  10/17/2024          INTERPRETATION:  CLINICAL INFORMATION: Right-sided weakness.    COMPARISON: CT head from 10/12/2024.    CONTRAST:  IV Contrast: Gadavist  7cc administered  Complications: None reported at time of study completion    TECHNIQUE: MRI brain was performed using the following sequences:   Sagittal T1 FLAIR, axial T1 FLAIR, axial T2, axial T2 FLAIR, axial DWI,   axial SWI, coronal T2, postcontrast axial T1 and.  The sagittal and   coronal reformat    FINDINGS:  TECHNICAL LIMITATIONS: There is moderate head motion artifact on the SWI   and coronal T2 sequences.  There is diffuse mild to moderate had motion   artifact on all other MR sequences, including the postcontrast images.    VENTRICLES AND SULCI: Age appropriate involutional changes.  INTRA-AXIAL: There is no diffusion restriction to indicate acute or   recent subacute infarct.  No evidence of intraparenchymal blood products,   vasogenic edema, mass effect, or midline shift.  Moderate confluent T2   FLAIR signal hyperintensity in the periventricular/deep white matter,   mild patchy T2 FLAIR signal hyperintensity in subcortical white matter,   and patchy T2 FLAIR signal hyperintensity within the central je are   consistent with microvascular type changes.  There are chronic lacunar   infarcts along the periphery of the lentiform nuclei bilaterally.  EXTRA-AXIAL: No blood products or subdural fluid collection.  POSTCONTRAST IMAGES: No intracranial mass or abnormal contrast   enhancement.  The dural venous sinuses are patent on the postcontrast MP.    Sequence.  Dominant venous outflow is to the right transverse sinus and   right jugular bulb.  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    SINUSES:  There are mucous retention cysts versus polyps measuring up to   1.8 cm in the left maxillary sinus and 8 mm the right maxillary sinus   (6:1 an 7:1).  Patchy ethmoid air cell mucosal thickening.  MASTOIDS:  Clear.  ORBITS: Status post right lens replacement.  CALVARIUM: Intact.    MISCELLANEOUS: There is mild soft tissue swelling in the anterior right   frontal scalp/forehead.  (6:22).      IMPRESSION:  Mild soft tissue swelling the anterior right frontal scalp/forehead.    Otherwise no MRI evidence of acute intracranial pathology.    Age-appropriate involutional changes.    Chronic small vessel ischemic changes as discussed above.        --- End of Report ---            ISAURO PRICE MD; Attending Radiologist  This document has been electronically signed. Oct 17 2024  5:49PM    < end of copied text >    Imaging Personally Reviewed:  YES/NO    Consultant(s) Notes Reviewed:   YES/ No    Care Discussed with Consultants :     Plan of care was discussed with patient and /or primary care giver; all questions and concerns were addressed and care was aligned with patient's wishes.

## 2024-10-19 LAB
ALBUMIN SERPL ELPH-MCNC: 1.7 G/DL — LOW (ref 3.5–5)
ALP SERPL-CCNC: 101 U/L — SIGNIFICANT CHANGE UP (ref 40–120)
ALT FLD-CCNC: 59 U/L DA — SIGNIFICANT CHANGE UP (ref 10–60)
ANION GAP SERPL CALC-SCNC: 6 MMOL/L — SIGNIFICANT CHANGE UP (ref 5–17)
AST SERPL-CCNC: 75 U/L — HIGH (ref 10–40)
BASOPHILS # BLD AUTO: 0.02 K/UL — SIGNIFICANT CHANGE UP (ref 0–0.2)
BASOPHILS NFR BLD AUTO: 0.2 % — SIGNIFICANT CHANGE UP (ref 0–2)
BILIRUB SERPL-MCNC: 0.5 MG/DL — SIGNIFICANT CHANGE UP (ref 0.2–1.2)
BUN SERPL-MCNC: 17 MG/DL — SIGNIFICANT CHANGE UP (ref 7–18)
CALCIUM SERPL-MCNC: 8.2 MG/DL — LOW (ref 8.4–10.5)
CHLORIDE SERPL-SCNC: 111 MMOL/L — HIGH (ref 96–108)
CO2 SERPL-SCNC: 24 MMOL/L — SIGNIFICANT CHANGE UP (ref 22–31)
CREAT SERPL-MCNC: 1.08 MG/DL — SIGNIFICANT CHANGE UP (ref 0.5–1.3)
EGFR: 68 ML/MIN/1.73M2 — SIGNIFICANT CHANGE UP
EOSINOPHIL # BLD AUTO: 0.02 K/UL — SIGNIFICANT CHANGE UP (ref 0–0.5)
EOSINOPHIL NFR BLD AUTO: 0.2 % — SIGNIFICANT CHANGE UP (ref 0–6)
GLUCOSE SERPL-MCNC: 113 MG/DL — HIGH (ref 70–99)
HCT VFR BLD CALC: 37.1 % — LOW (ref 39–50)
HGB BLD-MCNC: 12.3 G/DL — LOW (ref 13–17)
IMM GRANULOCYTES NFR BLD AUTO: 1 % — HIGH (ref 0–0.9)
LYMPHOCYTES # BLD AUTO: 0.86 K/UL — LOW (ref 1–3.3)
LYMPHOCYTES # BLD AUTO: 7.2 % — LOW (ref 13–44)
MAGNESIUM SERPL-MCNC: 2.1 MG/DL — SIGNIFICANT CHANGE UP (ref 1.6–2.6)
MCHC RBC-ENTMCNC: 31.8 PG — SIGNIFICANT CHANGE UP (ref 27–34)
MCHC RBC-ENTMCNC: 33.2 GM/DL — SIGNIFICANT CHANGE UP (ref 32–36)
MCV RBC AUTO: 95.9 FL — SIGNIFICANT CHANGE UP (ref 80–100)
MONOCYTES # BLD AUTO: 0.63 K/UL — SIGNIFICANT CHANGE UP (ref 0–0.9)
MONOCYTES NFR BLD AUTO: 5.3 % — SIGNIFICANT CHANGE UP (ref 2–14)
NEUTROPHILS # BLD AUTO: 10.34 K/UL — HIGH (ref 1.8–7.4)
NEUTROPHILS NFR BLD AUTO: 86.1 % — HIGH (ref 43–77)
NRBC # BLD: 0 /100 WBCS — SIGNIFICANT CHANGE UP (ref 0–0)
PHOSPHATE SERPL-MCNC: 2.6 MG/DL — SIGNIFICANT CHANGE UP (ref 2.5–4.5)
PLATELET # BLD AUTO: 114 K/UL — LOW (ref 150–400)
POTASSIUM SERPL-MCNC: 4 MMOL/L — SIGNIFICANT CHANGE UP (ref 3.5–5.3)
POTASSIUM SERPL-SCNC: 4 MMOL/L — SIGNIFICANT CHANGE UP (ref 3.5–5.3)
PROT SERPL-MCNC: 5.2 G/DL — LOW (ref 6–8.3)
RBC # BLD: 3.87 M/UL — LOW (ref 4.2–5.8)
RBC # FLD: 14 % — SIGNIFICANT CHANGE UP (ref 10.3–14.5)
SODIUM SERPL-SCNC: 141 MMOL/L — SIGNIFICANT CHANGE UP (ref 135–145)
WBC # BLD: 11.99 K/UL — HIGH (ref 3.8–10.5)
WBC # FLD AUTO: 11.99 K/UL — HIGH (ref 3.8–10.5)

## 2024-10-19 PROCEDURE — 99233 SBSQ HOSP IP/OBS HIGH 50: CPT

## 2024-10-19 RX ORDER — FUROSEMIDE 40 MG
40 TABLET ORAL ONCE
Refills: 0 | Status: COMPLETED | OUTPATIENT
Start: 2024-10-19 | End: 2024-10-19

## 2024-10-19 RX ADMIN — FOLIC ACID 1 MILLIGRAM(S): 1 TABLET ORAL at 08:47

## 2024-10-19 RX ADMIN — Medication 40 MILLIGRAM(S): at 09:46

## 2024-10-19 RX ADMIN — IPRATROPIUM BROMIDE AND ALBUTEROL SULFATE 3 MILLILITER(S): .5; 2.5 SOLUTION RESPIRATORY (INHALATION) at 20:04

## 2024-10-19 RX ADMIN — Medication 25 MILLIGRAM(S): at 06:08

## 2024-10-19 RX ADMIN — IPRATROPIUM BROMIDE AND ALBUTEROL SULFATE 3 MILLILITER(S): .5; 2.5 SOLUTION RESPIRATORY (INHALATION) at 15:16

## 2024-10-19 RX ADMIN — SODIUM CHLORIDE 4 MILLILITER(S): 9 INJECTION, SOLUTION INTRAMUSCULAR; INTRAVENOUS; SUBCUTANEOUS at 09:14

## 2024-10-19 RX ADMIN — Medication 0.4 MILLIGRAM(S): at 21:42

## 2024-10-19 RX ADMIN — Medication 500 MILLIGRAM(S): at 11:04

## 2024-10-19 RX ADMIN — HEPARIN SODIUM 5000 UNIT(S): 10000 INJECTION INTRAVENOUS; SUBCUTANEOUS at 17:37

## 2024-10-19 RX ADMIN — Medication 1 TABLET(S): at 11:04

## 2024-10-19 RX ADMIN — FOLIC ACID 1 MILLIGRAM(S): 1 TABLET ORAL at 17:37

## 2024-10-19 RX ADMIN — HEPARIN SODIUM 5000 UNIT(S): 10000 INJECTION INTRAVENOUS; SUBCUTANEOUS at 06:08

## 2024-10-19 RX ADMIN — VANCOMYCIN HYDROCHLORIDE 250 MILLIGRAM(S): KIT at 11:04

## 2024-10-19 RX ADMIN — Medication 25 MILLIGRAM(S): at 17:37

## 2024-10-19 RX ADMIN — PANTOPRAZOLE SODIUM 40 MILLIGRAM(S): 40 TABLET, DELAYED RELEASE ORAL at 06:08

## 2024-10-19 RX ADMIN — Medication 100 MILLIGRAM(S): at 01:22

## 2024-10-19 RX ADMIN — IPRATROPIUM BROMIDE AND ALBUTEROL SULFATE 3 MILLILITER(S): .5; 2.5 SOLUTION RESPIRATORY (INHALATION) at 09:17

## 2024-10-19 RX ADMIN — Medication 20 MILLIGRAM(S): at 21:42

## 2024-10-19 RX ADMIN — SODIUM CHLORIDE 4 MILLILITER(S): 9 INJECTION, SOLUTION INTRAMUSCULAR; INTRAVENOUS; SUBCUTANEOUS at 20:04

## 2024-10-19 NOTE — PROGRESS NOTE ADULT - ASSESSMENT
Bacteremia with 2 species of Aerococcus ( both Viridans and Urinae )  HIV +  Leukocytosis - normalized      Plan - Cont Vancomycin 1gm iv q12hrs give till 10/27/24  DC  Rocephin   Start Biktarvy 1tab po qd from 10/21/24  DC planning.

## 2024-10-19 NOTE — PROGRESS NOTE ADULT - ASSESSMENT
84-year-old man from home with hypertension, PVD, BPH, HIV (on Genvoya??) coming with his daughter after she found him on the floor.  She had been trying to get in touch with him for 3 days but was unable so came up to [place] from [place], where she had recently moved, and found him on the floor covered in feces and urine.  He was confused at the time.  He does not recall details about how he got to the floor.  Per daughter, he is independent and mentate as well at baseline. .  He appears very dry and sounds like he is swallowing his tongue.  His daughter has difficulty understanding him as well.    : Bacteremia due to Gram-positive bacteria.   # sepsis  # PNA  # rhabdo  # cardiac arrythmia  - abx per ID  - TTE: EF 20-25%; Global LV hypokinesis. Severe left ventricular hypertrophy.  - HEATHER negative for vegetations with reduced EF  - demand ischemias   - trop downtrended  - cont BB

## 2024-10-19 NOTE — PROGRESS NOTE ADULT - SUBJECTIVE AND OBJECTIVE BOX
Patient seen and examined this afternoon and later spoke with Daughter visiting from [place]     85yo m with  PMH of HTN, HD, PAD s/p bilateral LE stents, BPH, HIV, p/w ams following presumed unwitnessed fall amd prolonged unsuspecified  time on floor for up to 3 days possibly.; IN ED, found to be meeting severe sirs/sepsis criteria + in AHRF requiring 3 lpm via nc; admitted with suspected aspiration iso fall, dehydration    Patient found to be bacteremic with Aerococcus viridans; seen by ID being Rx with Vanco and Cefepime; Patient s/p HEATHER yesterday more coherent speech today; Chest still appears congested. No acute distress; denies any new complaints; leg edema appears improved today;     Vital Signs Last 24 Hrs  T(C): 37.1 (19 Oct 2024 21:13), Max: 37.1 (19 Oct 2024 21:13)  T(F): 98.8 (19 Oct 2024 21:13), Max: 98.8 (19 Oct 2024 21:13)  HR: 102 (19 Oct 2024 21:13) (96 - 102)  BP: 108/77 (19 Oct 2024 21:13) (108/77 - 128/82)  RR: 20 (19 Oct 2024 21:13) (19 - 20)  SpO2: 94% (19 Oct 2024 21:13) (94% - 100%) nasal cannula O2 Flow (L/min): 2      P/E:  elderly male, appear chronically ill and somewhat disheveled,   Neuro: AAO x 3; improved mentation  Chest : B/L Coarse breath sounds  CVS: S1S2 present  Abdomen, soft, BS+, NT; mildly distended  Ext: significant edema of RUE and RLE with skin breakdown, right (may be portal of entry for bacteremia)    Labs:                                          12.3   11.99 )-----------( 114      ( 19 Oct 2024 05:49 )             37.1     10-19  141  |  111[H]  |  17  ----------------------------<  113[H]  4.0   |  24  |  1.08    Ca    8.2[L]      19 Oct 2024 05:49  Phos  2.6     10-19  Mg     2.1     10-19  TPro  5.2[L]  /  Alb  1.7[L]  /  TBili  0.5  /  DBili  x   /  AST  75[H]  /  ALT  59  /  AlkPhos  101  10-19    CT Chest No Cont (10.18.24 @ 16:09) >    IMPRESSION:  Bilateral pleural effusions. Cardiomegaly.  Pulmonary edema with probable pneumonia involving right middle lobe and scattered opacities bilateral lower lobes.                  Folate, Serum (10.14.24 @ 06:40) Folate, Serum: <2.0: Test Repeated ng/mL  Thyroid Stimulating Hormone, Serum: 2.25 uU/mL (10.14.24 @ 06:40)     Lactate, Blood (10.14.24 @ 06:40) Lactate, Blood: 1.5 mmol/L  Lactate, Blood (10.12.24 @ 19:00) Lactate, Blood: 2.9:    Culture - Blood (10.12.24 @ 15:15)   Growth in aerobic and anaerobic bottles: Aerococcus viridans   Growth in anaerobic bottle: Aerococcus urinae     Xray Chest 1 View AP/PA (10.14.24 @ 09:00)   IMPRESSION: Increasing bibasilar fluid accumulations.    CT Head No Cont (10.12.24 @ 18:44)   Impression: Normal CT of the brain.    A/P:  Sepsis with Bacteremia due to Aerococcus viridans etiology ?LE Ulcers   concern for Endocarditis ruled out  A. Fib with RVR ppt by severe infection much improved HR  Acute Hypoxic Resp. failure with Suspected Aspiration Pneumonia  Right facial droop and Right hemiparesis concern for stroke  Severe folate deficiency  B/L LE edema etiology ?? Venous insufficiency   Lactic acidosis possibly starvation ketoacidosis  Hypernatremia secondary to dehydration resolved  Rhabdomyolysis due to fall and prolonged immobilization  Hx PAD with B/L stents  Elevated troponin likely demand ischemia    Plan:  Continue IV Vanco and Ceftriaxone for now; Vanco trough elevated; Hold dose and resume at reduced dose; ID follow up  HEATEHR negative for Endocarditis; Likely to treat current bacteremia for 2 weeks   Cardiology f/u  better controlled HR s/p Digoxin and resuming low dose Metoprolol 25 mg q 12 hrs; if HR remain elevated increase to 25 mg q 8 hrs  Right hemiparesis, r/o CNS emboli vs CNS mass; Neuro eval appreciated; d/w Dr. More; MRI with and without contrast given Hx HIV  Lasix 20 mg IV x one more dose today; Repeat CXR AM; if remain congested; get Non contrast CT tomorrow and Pulmonary consult  Repeat Blood Cx testing; so far negative;   Nutrition consultation; Add Ensure BID  Follow up HIV VL and CD4, resume ARV,   PT/ OT eval; will need [facility] placement; Daughter has choices already  Social work/ Case mgmt consult for disposition once medically stable; critically ill at this time although improved;   .  Discussed with Daughter at bedside and reviewed findings and plan of care as above  Discussed with SMILEY Faustin and RN

## 2024-10-19 NOTE — PROGRESS NOTE ADULT - SUBJECTIVE AND OBJECTIVE BOX
PATIENT SEEN AND EXAMINED BY ADEN ZHANG M.D. ON :- 10/19/24  DATE OF SERVICE: 10/19/24            Interim events noted,Labs ,Radiological studies and Cardiology tests reviewed .    Patient is a 84y old  Male who presents with a chief complaint of sepsis (19 Oct 2024 17:18)      HPI:  84-year-old man from home with hypertension, PVD, BPH, HIV (on [place]??) coming with his daughter after she found him on the floor.  She had been trying to get in touch with him for 3 days but was unable so came up to [place] from [place], where she had recently moved, and found him on the floor covered in feces and urine.  He was confused at the time.  He does not recall details about how he got to the floor.  Per daughter, he is independent and mentate as well at baseline. On exam, he appears to be A&O x 3 but he is very difficult to understand.  He appears very dry and sounds like he is swallowing his tongue.  His daughter has difficulty understanding him as well.    Exam is notable for very dry appearance, garbled speech, rhonchi on all anterior lung fields, saturating 88% on room air and requiring 3 L nasal cannula, bilateral lower extremity pitting edema with old and new wounds on the legs, and an abrasion on the forehead.     ED Course    Vitals: HRmax 115, O2 88% on RA, requiring 3L NC  Labs: WBC 13, plt 82, trops 550>538, lactate 2.3>2.9, BUN/Cr 39/1.26, gluc 64, , UA with RBC, granular casts, protein  Intervention: s/p zosyn, 2.5L IVF  Consults:   Images: CTH wnl; CXR with bl LL consolidations on wet read        (12 Oct 2024 21:33)      PAST MEDICAL & SURGICAL HISTORY:  HIV (human immunodeficiency virus infection)      Umbilical hernia      Essential hypertension      Cardiomegaly      Elevated PSA      Urge incontinence      PVD (peripheral vascular disease)      Shingles      Enlarged prostate      Unilateral inguinal hernia, without obstruction or gangrene, not specified as recurrent      S/P hernia repair  right inguinal - 2013      History of cystoscopy  with photovaporization, green light laser lithotripsy 2016      PVD (peripheral vascular disease)  bilateral leg stents 2020      S/P cataract surgery          PREVIOUS DIAGNOSTIC TESTING:      ECHO  FINDINGS:    STRESS  FINDINGS:    CATHETERIZATION  FINDINGS:    MEDICATIONS  (STANDING):  albuterol/ipratropium for Nebulization 3 milliLiter(s) Nebulizer every 6 hours  ascorbic acid 500 milliGRAM(s) Oral daily  cefTRIAXone   IVPB 2000 milliGRAM(s) IV Intermittent every 24 hours  folic acid 1 milliGRAM(s) Oral <User Schedule>  heparin   Injectable 5000 Unit(s) SubCutaneous every 12 hours  influenza  Vaccine (HIGH DOSE) 0.5 milliLiter(s) IntraMuscular once  metoprolol tartrate 25 milliGRAM(s) Oral two times a day  multivitamin 1 Tablet(s) Oral daily  pantoprazole    Tablet 40 milliGRAM(s) Oral before breakfast  rosuvastatin 20 milliGRAM(s) Oral at bedtime  sodium chloride 3%  Inhalation 4 milliLiter(s) Inhalation every 12 hours  tamsulosin 0.4 milliGRAM(s) Oral at bedtime  vancomycin  IVPB 1000 milliGRAM(s) IV Intermittent every 24 hours    MEDICATIONS  (PRN):  aluminum hydroxide/magnesium hydroxide/simethicone Suspension 30 milliLiter(s) Oral every 4 hours PRN Dyspepsia  benzonatate 100 milliGRAM(s) Oral every 8 hours PRN Cough  guaiFENesin  milliGRAM(s) Oral every 12 hours PRN Cough  melatonin 3 milliGRAM(s) Oral at bedtime PRN Insomnia  ondansetron Injectable 4 milliGRAM(s) IV Push every 8 hours PRN Nausea and/or Vomiting      FAMILY HISTORY:  No pertinent family history in first degree relatives        SOCIAL HISTORY:    CIGARETTES:    ALCOHOL:    REVIEW OF SYSTEMS:  CONSTITUTIONAL: No fever, weight loss, or fatigue  EYES: No eye pain, visual disturbances, or discharge  ENMT:  No difficulty hearing, tinnitus, vertigo; No sinus or throat pain  NECK: No pain or stiffness  RESPIRATORY: No cough, wheezing, chills or hemoptysis; No shortness of breath  CARDIOVASCULAR: No chest pain, palpitations, dizziness, or leg swelling  GASTROINTESTINAL: No abdominal or epigastric pain. No nausea, vomiting, or hematemesis; No diarrhea or constipation. No melena or hematochezia.  GENITOURINARY: No dysuria, frequency, hematuria, or incontinence  NEUROLOGICAL: No headaches, memory loss, loss of strength, numbness, or tremors  SKIN: No itching, burning, rashes, or lesions   LYMPH NODES: No enlarged glands  ENDOCRINE: No heat or cold intolerance; No hair loss  MUSCULOSKELETAL: No joint pain or swelling; No muscle, back, or extremity pain  PSYCHIATRIC: No depression, anxiety, mood swings, or difficulty sleeping  HEME/LYMPH: No easy bruising, or bleeding gums  ALLERY AND IMMUNOLOGIC: No hives or eczema    Vital Signs Last 24 Hrs  T(C): 36.8 (19 Oct 2024 15:25), Max: 36.8 (19 Oct 2024 05:03)  T(F): 98.2 (19 Oct 2024 15:25), Max: 98.2 (19 Oct 2024 05:03)  HR: 102 (19 Oct 2024 15:25) (96 - 102)  BP: 114/77 (19 Oct 2024 15:25) (114/77 - 137/89)  BP(mean): --  RR: 20 (19 Oct 2024 15:25) (19 - 20)  SpO2: 100% (19 Oct 2024 15:25) (96% - 100%)    Parameters below as of 19 Oct 2024 15:25  Patient On (Oxygen Delivery Method): nasal cannula  O2 Flow (L/min): 2        PHYSICAL EXAM:  GENERAL: NAD, well-groomed, well-developed  HEAD:  Atraumatic, Normocephalic  EYES: EOMI, PERRLA, conjunctiva and sclera clear  ENMT: No tonsillar erythema, exudates, or enlargement; Moist mucous membranes, Good dentition, No lesions  NECK: Supple, No JVD, Normal thyroid  NERVOUS SYSTEM:  Alert & Oriented X3, Good concentration; Motor Strength 5/5 B/L upper and lower extremities; DTRs 2+ intact and symmetric  CHEST/LUNG: Clear to percussion bilaterally; No rales, rhonchi, wheezing, or rubs  HEART: Regular rate and rhythm; No murmurs, rubs, or gallops  ABDOMEN: Soft, Nontender, Nondistended; Bowel sounds present  EXTREMITIES:  2+ Peripheral Pulses, No clubbing, cyanosis, or edema  LYMPH: No lymphadenopathy noted  SKIN: No rashes or lesions      INTERPRETATION OF TELEMETRY:    EC[facility]    [facility]     LABS:                        12.3   11.99 )-----------( 114      ( 19 Oct 2024 05:49 )             37.1     10-19    141  |  111[H]  |  17  ----------------------------<  113[H]  4.0   |  24  |  1.08    Ca    8.2[L]      19 Oct 2024 05:49  Phos  2.6     10-19  Mg     2.1     10-19    TPro  5.2[L]  /  Alb  1.7[L]  /  TBili  0.5  /  DBili  x   /  AST  75[H]  /  ALT  59  /  AlkPhos  101  10-19          Urinalysis Basic - ( 19 Oct 2024 05:49 )    Color: x / Appearance: x / SG: x / pH: x  Gluc: 113 mg/dL / Ketone: x  / Bili: x / Urobili: x   Blood: x / Protein: x / Nitrite: x   Leuk Esterase: x / RBC: x / WBC x   Sq Epi: x / Non Sq Epi: x / Bacteria: x      Lipid Panel:   I&O's Summary    18 Oct 2024 07:01  -  19 Oct 2024 07:00  --------------------------------------------------------  IN: 100 mL / OUT: 0 mL / NET: 100 mL        RADIOLOGY & ADDITIONAL STUDIES:    < from: HEATHER W or WO Ultrasound Enhancing Agent (10.16.24 @ 11:49) >  CONCLUSIONS:      1. No echocardiographic evidence of vegetations.   2. Left ventricular systolic function is severely decreased with an ejection fraction visually estimated at 25 to 30 %.   3. Moderate mitral regurgitation.   4. Mild aortic regurgitation.   5. Moderate tricuspid regurgitation.   6. Trace pulmonic regurgitation.   7. Estimated pulmonary artery systolic pressure is 59 mmHg, consistent with moderatepulmonary hypertension.   8. No pericardial effusion seen.   9. Left pleural effusion noted.  10. Mild atheroma in the visualized portions of the descending aorta.  11. Compared to the transthoracic echocardiogram performed on 10/15/2024, a HEATHER was performed and additional information provided.    < end of copied text >

## 2024-10-19 NOTE — PROGRESS NOTE ADULT - SUBJECTIVE AND OBJECTIVE BOX
84y Male    Meds:  cefTRIAXone   IVPB 2000 milliGRAM(s) IV Intermittent every 24 hours  vancomycin  IVPB 1000 milliGRAM(s) IV Intermittent every 24 hours    Allergies    No Known Allergies    Intolerances        VITALS:  Vital Signs Last 24 Hrs  T(C): 36.8 (19 Oct 2024 15:25), Max: 36.8 (19 Oct 2024 05:03)  T(F): 98.2 (19 Oct 2024 15:25), Max: 98.2 (19 Oct 2024 05:03)  HR: 102 (19 Oct 2024 15:25) (96 - 102)  BP: 114/77 (19 Oct 2024 15:25) (114/77 - 137/89)  BP(mean): --  RR: 20 (19 Oct 2024 15:25) (19 - 20)  SpO2: 100% (19 Oct 2024 15:25) (96% - 100%)    Parameters below as of 19 Oct 2024 15:25  Patient On (Oxygen Delivery Method): nasal cannula  O2 Flow (L/min): 2      LABS/DIAGNOSTIC TESTS:                          12.3   11.99 )-----------( 114      ( 19 Oct 2024 05:49 )             37.1         10-19    141  |  111[H]  |  17  ----------------------------<  113[H]  4.0   |  24  |  1.08    Ca    8.2[L]      19 Oct 2024 05:49  Phos  2.6     10-19  Mg     2.1     10-19    TPro  5.2[L]  /  Alb  1.7[L]  /  TBili  0.5  /  DBili  x   /  AST  75[H]  /  ALT  59  /  AlkPhos  101  10-19      LIVER FUNCTIONS - ( 19 Oct 2024 05:49 )  Alb: 1.7 g/dL / Pro: 5.2 g/dL / ALK PHOS: 101 U/L / ALT: 59 U/L DA / AST: 75 U/L / GGT: x             CULTURES: .Blood BLOOD  10-15 @ 07:08   No growth at 4 days  --  --      .Blood BLOOD  10-15 @ 07:00   No growth at 4 days  --  --      .Sputum Sputum  10-14 @ 06:14   Commensal javy consistent with body site  --    Rare polymorphonuclear leukocytes per low power field  Rare Squamous epithelial cells per low power field  Rare Yeast like cells per oil power field      .Blood BLOOD  10-13 @ 13:45   No growth at 5 days  --  --      .Blood BLOOD  10-13 @ 13:30   No growth at 5 days  --  --      .Blood BLOOD  10-12 @ 15:20   Growth in aerobic bottle: Staphylococcus epidermidis  "Susceptibilities not performed"  Growth in aerobic and anaerobic bottles: Aerococcus viridans  Growth in anaerobic bottle: Aerococcus urinae  Isolates of Aerococcus spp. are predictably susceptible to penicillin,  ampicillin, and vancomycin. All isolates are resistant to sulfonamides.  --    Growth in aerobic bottle: Gram Positive Cocci in Clusters  Growth in anaerobic bottle: Gram Positive Cocci in Clusters      .Blood BLOOD  10-12 @ 15:15   Growth in aerobic and anaerobic bottles: Aerococcus viridans  Growth in anaerobic bottle: Aerococcus urinae  Isolates of Aerococcus spp. are predictably susceptible to penicillin,  ampicillin, and vancomycin. All isolates are resistant to sulfonamides.  Direct identification is available within approximately 3-5  hours either by Blood Panel Multiplexed PCR or Direct  MALDI-TOF. Details: [email or web address]  --  Blood Culture PCR  Blood Culture PCR            RADIOLOGY:      ROS:  [  ] UNABLE TO ELICIT 84y Male who is doing much better mentally and is talking to me appropriately, he is still forgetful but recognized me. He has no active complaints, no fevers or chills, no urinary symptoms, no diarrhea. His repeat blood cultures from 10/13 are negative. His HEATHER was negative for endocarditis and so only needs a total of 14 days of Vancomycin from 1st negative culture.     Meds:  cefTRIAXone   IVPB 2000 milliGRAM(s) IV Intermittent every 24 hours  vancomycin  IVPB 1000 milliGRAM(s) IV Intermittent every 24 hours    Allergies    No Known Allergies    Intolerances        VITALS:  Vital Signs Last 24 Hrs  T(C): 36.8 (19 Oct 2024 15:25), Max: 36.8 (19 Oct 2024 05:03)  T(F): 98.2 (19 Oct 2024 15:25), Max: 98.2 (19 Oct 2024 05:03)  HR: 102 (19 Oct 2024 15:25) (96 - 102)  BP: 114/77 (19 Oct 2024 15:25) (114/77 - 137/89)  BP(mean): --  RR: 20 (19 Oct 2024 15:25) (19 - 20)  SpO2: 100% (19 Oct 2024 15:25) (96% - 100%)    Parameters below as of 19 Oct 2024 15:25  Patient On (Oxygen Delivery Method): nasal cannula  O2 Flow (L/min): 2      LABS/DIAGNOSTIC TESTS:                          12.3   11.99 )-----------( 114      ( 19 Oct 2024 05:49 )             37.1         10-19    141  |  111[H]  |  17  ----------------------------<  113[H]  4.0   |  24  |  1.08    Ca    8.2[L]      19 Oct 2024 05:49  Phos  2.6     10-19  Mg     2.1     10-19    TPro  5.2[L]  /  Alb  1.7[L]  /  TBili  0.5  /  DBili  x   /  AST  75[H]  /  ALT  59  /  AlkPhos  101  10-19      LIVER FUNCTIONS - ( 19 Oct 2024 05:49 )  Alb: 1.7 g/dL / Pro: 5.2 g/dL / ALK PHOS: 101 U/L / ALT: 59 U/L DA / AST: 75 U/L / GGT: x             CULTURES: .Blood BLOOD  10-15 @ 07:08   No growth at 4 days  --  --      .Blood BLOOD  10-15 @ 07:00   No growth at 4 days  --  --      .Sputum Sputum  10-14 @ 06:14   Commensal javy consistent with body site  --    Rare polymorphonuclear leukocytes per low power field  Rare Squamous epithelial cells per low power field  Rare Yeast like cells per oil power field      .Blood BLOOD  10-13 @ 13:45   No growth at 5 days  --  --      .Blood BLOOD  10-13 @ 13:30   No growth at 5 days  --  --      .Blood BLOOD  10-12 @ 15:20   Growth in aerobic bottle: Staphylococcus epidermidis  "Susceptibilities not performed"  Growth in aerobic and anaerobic bottles: Aerococcus viridans  Growth in anaerobic bottle: Aerococcus urinae  Isolates of Aerococcus spp. are predictably susceptible to penicillin,  ampicillin, and vancomycin. All isolates are resistant to sulfonamides.  --    Growth in aerobic bottle: Gram Positive Cocci in Clusters  Growth in anaerobic bottle: Gram Positive Cocci in Clusters      .Blood BLOOD  10-12 @ 15:15   Growth in aerobic and anaerobic bottles: Aerococcus viridans  Growth in anaerobic bottle: Aerococcus urinae  Isolates of Aerococcus spp. are predictably susceptible to penicillin,  ampicillin, and vancomycin. All isolates are resistant to sulfonamides.  Direct identification is available within approximately 3-5  hours either by Blood Panel Multiplexed PCR or Direct  MALDI-TOF. Details: [email or web address]  --  Blood Culture PCR  Blood Culture PCR            RADIOLOGY:      ROS:  [  ] UNABLE TO ELICIT

## 2024-10-20 LAB
ALBUMIN SERPL ELPH-MCNC: 1.8 G/DL — LOW (ref 3.5–5)
ALP SERPL-CCNC: 106 U/L — SIGNIFICANT CHANGE UP (ref 40–120)
ALT FLD-CCNC: 69 U/L DA — HIGH (ref 10–60)
ANION GAP SERPL CALC-SCNC: 5 MMOL/L — SIGNIFICANT CHANGE UP (ref 5–17)
AST SERPL-CCNC: 93 U/L — HIGH (ref 10–40)
BASOPHILS # BLD AUTO: 0.03 K/UL — SIGNIFICANT CHANGE UP (ref 0–0.2)
BASOPHILS NFR BLD AUTO: 0.3 % — SIGNIFICANT CHANGE UP (ref 0–2)
BILIRUB SERPL-MCNC: 0.4 MG/DL — SIGNIFICANT CHANGE UP (ref 0.2–1.2)
BUN SERPL-MCNC: 19 MG/DL — HIGH (ref 7–18)
CALCIUM SERPL-MCNC: 8.3 MG/DL — LOW (ref 8.4–10.5)
CHLORIDE SERPL-SCNC: 111 MMOL/L — HIGH (ref 96–108)
CO2 SERPL-SCNC: 27 MMOL/L — SIGNIFICANT CHANGE UP (ref 22–31)
CREAT SERPL-MCNC: 1.01 MG/DL — SIGNIFICANT CHANGE UP (ref 0.5–1.3)
CULTURE RESULTS: SIGNIFICANT CHANGE UP
CULTURE RESULTS: SIGNIFICANT CHANGE UP
EGFR: 73 ML/MIN/1.73M2 — SIGNIFICANT CHANGE UP
EOSINOPHIL # BLD AUTO: 0.04 K/UL — SIGNIFICANT CHANGE UP (ref 0–0.5)
EOSINOPHIL NFR BLD AUTO: 0.4 % — SIGNIFICANT CHANGE UP (ref 0–6)
GLUCOSE SERPL-MCNC: 109 MG/DL — HIGH (ref 70–99)
HCT VFR BLD CALC: 35 % — LOW (ref 39–50)
HGB BLD-MCNC: 11.9 G/DL — LOW (ref 13–17)
IMM GRANULOCYTES NFR BLD AUTO: 0.6 % — SIGNIFICANT CHANGE UP (ref 0–0.9)
LYMPHOCYTES # BLD AUTO: 1.04 K/UL — SIGNIFICANT CHANGE UP (ref 1–3.3)
LYMPHOCYTES # BLD AUTO: 10.3 % — LOW (ref 13–44)
MAGNESIUM SERPL-MCNC: 2.2 MG/DL — SIGNIFICANT CHANGE UP (ref 1.6–2.6)
MCHC RBC-ENTMCNC: 31.6 PG — SIGNIFICANT CHANGE UP (ref 27–34)
MCHC RBC-ENTMCNC: 34 GM/DL — SIGNIFICANT CHANGE UP (ref 32–36)
MCV RBC AUTO: 93.1 FL — SIGNIFICANT CHANGE UP (ref 80–100)
MONOCYTES # BLD AUTO: 0.62 K/UL — SIGNIFICANT CHANGE UP (ref 0–0.9)
MONOCYTES NFR BLD AUTO: 6.1 % — SIGNIFICANT CHANGE UP (ref 2–14)
NEUTROPHILS # BLD AUTO: 8.31 K/UL — HIGH (ref 1.8–7.4)
NEUTROPHILS NFR BLD AUTO: 82.3 % — HIGH (ref 43–77)
NRBC # BLD: 0 /100 WBCS — SIGNIFICANT CHANGE UP (ref 0–0)
PHOSPHATE SERPL-MCNC: 2.2 MG/DL — LOW (ref 2.5–4.5)
PLATELET # BLD AUTO: 162 K/UL — SIGNIFICANT CHANGE UP (ref 150–400)
POTASSIUM SERPL-MCNC: 3.8 MMOL/L — SIGNIFICANT CHANGE UP (ref 3.5–5.3)
POTASSIUM SERPL-SCNC: 3.8 MMOL/L — SIGNIFICANT CHANGE UP (ref 3.5–5.3)
PROT SERPL-MCNC: 5.2 G/DL — LOW (ref 6–8.3)
RBC # BLD: 3.76 M/UL — LOW (ref 4.2–5.8)
RBC # FLD: 13.9 % — SIGNIFICANT CHANGE UP (ref 10.3–14.5)
SODIUM SERPL-SCNC: 143 MMOL/L — SIGNIFICANT CHANGE UP (ref 135–145)
SPECIMEN SOURCE: SIGNIFICANT CHANGE UP
SPECIMEN SOURCE: SIGNIFICANT CHANGE UP
VANCOMYCIN TROUGH SERPL-MCNC: 15.1 UG/ML — SIGNIFICANT CHANGE UP (ref 10–20)
WBC # BLD: 10.1 K/UL — SIGNIFICANT CHANGE UP (ref 3.8–10.5)
WBC # FLD AUTO: 10.1 K/UL — SIGNIFICANT CHANGE UP (ref 3.8–10.5)

## 2024-10-20 PROCEDURE — 99232 SBSQ HOSP IP/OBS MODERATE 35: CPT

## 2024-10-20 RX ORDER — FUROSEMIDE 40 MG
40 TABLET ORAL DAILY
Refills: 0 | Status: DISCONTINUED | OUTPATIENT
Start: 2024-10-20 | End: 2024-10-22

## 2024-10-20 RX ORDER — BICTEGRAVIR SODIUM, EMTRICITABINE, AND TENOFOVIR ALAFENAMIDE FUMARATE 50; 200; 25 MG/1; MG/1; MG/1
1 TABLET ORAL DAILY
Refills: 0 | Status: DISCONTINUED | OUTPATIENT
Start: 2024-10-21 | End: 2024-10-22

## 2024-10-20 RX ORDER — DIBASIC SODIUM PHOSPHATE, MONOBASIC POTASSIUM PHOSPHATE AND MONOBASIC SODIUM PHOSPHATE 852; 155; 130 MG/1; MG/1; MG/1
1 TABLET ORAL EVERY 4 HOURS
Refills: 0 | Status: COMPLETED | OUTPATIENT
Start: 2024-10-20 | End: 2024-10-20

## 2024-10-20 RX ADMIN — Medication 30 MILLILITER(S): at 11:47

## 2024-10-20 RX ADMIN — Medication 0.4 MILLIGRAM(S): at 22:48

## 2024-10-20 RX ADMIN — Medication 500 MILLIGRAM(S): at 11:09

## 2024-10-20 RX ADMIN — Medication 25 MILLIGRAM(S): at 06:32

## 2024-10-20 RX ADMIN — Medication 1 TABLET(S): at 11:09

## 2024-10-20 RX ADMIN — Medication 100 MILLIGRAM(S): at 02:07

## 2024-10-20 RX ADMIN — SODIUM CHLORIDE 4 MILLILITER(S): 9 INJECTION, SOLUTION INTRAMUSCULAR; INTRAVENOUS; SUBCUTANEOUS at 09:34

## 2024-10-20 RX ADMIN — DIBASIC SODIUM PHOSPHATE, MONOBASIC POTASSIUM PHOSPHATE AND MONOBASIC SODIUM PHOSPHATE 1 TABLET(S): 852; 155; 130 TABLET ORAL at 14:36

## 2024-10-20 RX ADMIN — DIBASIC SODIUM PHOSPHATE, MONOBASIC POTASSIUM PHOSPHATE AND MONOBASIC SODIUM PHOSPHATE 1 TABLET(S): 852; 155; 130 TABLET ORAL at 18:13

## 2024-10-20 RX ADMIN — HEPARIN SODIUM 5000 UNIT(S): 10000 INJECTION INTRAVENOUS; SUBCUTANEOUS at 06:32

## 2024-10-20 RX ADMIN — SODIUM CHLORIDE 4 MILLILITER(S): 9 INJECTION, SOLUTION INTRAMUSCULAR; INTRAVENOUS; SUBCUTANEOUS at 20:33

## 2024-10-20 RX ADMIN — IPRATROPIUM BROMIDE AND ALBUTEROL SULFATE 3 MILLILITER(S): .5; 2.5 SOLUTION RESPIRATORY (INHALATION) at 09:34

## 2024-10-20 RX ADMIN — FOLIC ACID 1 MILLIGRAM(S): 1 TABLET ORAL at 18:14

## 2024-10-20 RX ADMIN — Medication 25 MILLIGRAM(S): at 18:14

## 2024-10-20 RX ADMIN — GUAIFENESIN 600 MILLIGRAM(S): 100 LIQUID ORAL at 11:47

## 2024-10-20 RX ADMIN — VANCOMYCIN HYDROCHLORIDE 250 MILLIGRAM(S): KIT at 11:10

## 2024-10-20 RX ADMIN — Medication 3 MILLIGRAM(S): at 22:51

## 2024-10-20 RX ADMIN — PANTOPRAZOLE SODIUM 40 MILLIGRAM(S): 40 TABLET, DELAYED RELEASE ORAL at 06:32

## 2024-10-20 RX ADMIN — FOLIC ACID 1 MILLIGRAM(S): 1 TABLET ORAL at 08:28

## 2024-10-20 RX ADMIN — IPRATROPIUM BROMIDE AND ALBUTEROL SULFATE 3 MILLILITER(S): .5; 2.5 SOLUTION RESPIRATORY (INHALATION) at 20:33

## 2024-10-20 RX ADMIN — IPRATROPIUM BROMIDE AND ALBUTEROL SULFATE 3 MILLILITER(S): .5; 2.5 SOLUTION RESPIRATORY (INHALATION) at 15:06

## 2024-10-20 RX ADMIN — HEPARIN SODIUM 5000 UNIT(S): 10000 INJECTION INTRAVENOUS; SUBCUTANEOUS at 18:14

## 2024-10-20 RX ADMIN — Medication 100 MILLIGRAM(S): at 22:51

## 2024-10-20 RX ADMIN — Medication 20 MILLIGRAM(S): at 22:48

## 2024-10-20 RX ADMIN — Medication 40 MILLIGRAM(S): at 11:10

## 2024-10-20 NOTE — PROGRESS NOTE ADULT - SUBJECTIVE AND OBJECTIVE BOX
PATIENT SEEN AND EXAMINED BY ADEN ZHANG M.D. ON :- 10/20/24  DATE OF SERVICE:      10/20/24       Interim events noted,Labs ,Radiological studies and Cardiology tests reviewed .    Patient is a 84y old  Male who presents with a chief complaint of sepsis (19 Oct 2024 23:53)      HPI:  84-year-old man from home with hypertension, PVD, BPH, HIV (on Genvoya??) coming with his daughter after she found him on the floor.  She had been trying to get in touch with him for 3 days but was unable so came up to [place] from [place], where she had recently moved, and found him on the floor covered in feces and urine.  He was confused at the time.  He does not recall details about how he got to the floor.  Per daughter, he is independent and mentate as well at baseline. On exam, he appears to be A&O x 3 but he is very difficult to understand.  He appears very dry and sounds like he is swallowing his tongue.  His daughter has difficulty understanding him as well.    Exam is notable for very dry appearance, garbled speech, rhonchi on all anterior lung fields, saturating 88% on room air and requiring 3 L nasal cannula, bilateral lower extremity pitting edema with old and new wounds on the legs, and an abrasion on the forehead.     ED Course    Vitals: HRmax 115, O2 88% on RA, requiring 3L NC  Labs: WBC 13, plt 82, trops 550>538, lactate 2.3>2.9, BUN/Cr 39/1.26, gluc 64, , UA with RBC, granular casts, protein  Intervention: s/p zosyn, 2.5L IVF  Consults:   Images: CTH wnl; CXR with bl LL consolidations on wet read        (12 Oct 2024 21:33)      PAST MEDICAL & SURGICAL HISTORY:  HIV (human immunodeficiency virus infection)      Umbilical hernia      Essential hypertension      Cardiomegaly      Elevated PSA      Urge incontinence      PVD (peripheral vascular disease)      Shingles      Enlarged prostate      Unilateral inguinal hernia, without obstruction or gangrene, not specified as recurrent      S/P hernia repair  right inguinal - 2013      History of cystoscopy  with photovaporization, green light laser lithotripsy 2016      PVD (peripheral vascular disease)  bilateral leg stents 2020      S/P cataract surgery          PREVIOUS DIAGNOSTIC TESTING:      ECHO  FINDINGS:    STRESS  FINDINGS:    CATHETERIZATION  FINDINGS:    MEDICATIONS  (STANDING):  albuterol/ipratropium for Nebulization 3 milliLiter(s) Nebulizer every 6 hours  ascorbic acid 500 milliGRAM(s) Oral daily  folic acid 1 milliGRAM(s) Oral <User Schedule>  furosemide    Tablet 40 milliGRAM(s) Oral daily  heparin   Injectable 5000 Unit(s) SubCutaneous every 12 hours  influenza  Vaccine (HIGH DOSE) 0.5 milliLiter(s) IntraMuscular once  metoprolol tartrate 25 milliGRAM(s) Oral two times a day  multivitamin 1 Tablet(s) Oral daily  pantoprazole    Tablet 40 milliGRAM(s) Oral before breakfast  rosuvastatin 20 milliGRAM(s) Oral at bedtime  sodium chloride 3%  Inhalation 4 milliLiter(s) Inhalation every 12 hours  tamsulosin 0.4 milliGRAM(s) Oral at bedtime  vancomycin  IVPB 1000 milliGRAM(s) IV Intermittent every 24 hours    MEDICATIONS  (PRN):  aluminum hydroxide/magnesium hydroxide/simethicone Suspension 30 milliLiter(s) Oral every 4 hours PRN Dyspepsia  benzonatate 100 milliGRAM(s) Oral every 8 hours PRN Cough  guaiFENesin  milliGRAM(s) Oral every 12 hours PRN Cough  melatonin 3 milliGRAM(s) Oral at bedtime PRN Insomnia  ondansetron Injectable 4 milliGRAM(s) IV Push every 8 hours PRN Nausea and/or Vomiting      FAMILY HISTORY:  No pertinent family history in first degree relatives        SOCIAL HISTORY:    CIGARETTES:    ALCOHOL:    REVIEW OF SYSTEMS:  CONSTITUTIONAL: No fever, weight loss, or fatigue  EYES: No eye pain, visual disturbances, or discharge  ENMT:  No difficulty hearing, tinnitus, vertigo; No sinus or throat pain  NECK: No pain or stiffness  RESPIRATORY: No cough, wheezing, chills or hemoptysis; No shortness of breath  CARDIOVASCULAR: No chest pain, palpitations, dizziness, or leg swelling  GASTROINTESTINAL: No abdominal or epigastric pain. No nausea, vomiting, or hematemesis; No diarrhea or constipation. No melena or hematochezia.  GENITOURINARY: No dysuria, frequency, hematuria, or incontinence  NEUROLOGICAL: No headaches, memory loss, loss of strength, numbness, or tremors  SKIN: No itching, burning, rashes, or lesions   LYMPH NODES: No enlarged glands  ENDOCRINE: No heat or cold intolerance; No hair loss  MUSCULOSKELETAL: No joint pain or swelling; No muscle, back, or extremity pain  PSYCHIATRIC: No depression, anxiety, mood swings, or difficulty sleeping  HEME/LYMPH: No easy bruising, or bleeding gums  ALLERY AND IMMUNOLOGIC: No hives or eczema    Vital Signs Last 24 Hrs  T(C): 36.5 (20 Oct 2024 20:35), Max: 37 (19 Oct 2024 23:35)  T(F): 97.7 (20 Oct 2024 20:35), Max: 98.6 (19 Oct 2024 23:35)  HR: 98 (20 Oct 2024 20:35) (98 - 101)  BP: 110/78 (20 Oct 2024 20:35) (110/78 - 138/75)  BP(mean): --  RR: 20 (20 Oct 2024 20:35) (18 - 20)  SpO2: 96% (20 Oct 2024 20:35) (94% - 96%)    Parameters below as of 20 Oct 2024 20:35  Patient On (Oxygen Delivery Method): room air          PHYSICAL EXAM:  GENERAL: NAD, well-groomed, well-developed  HEAD:  Atraumatic, Normocephalic  EYES: EOMI, PERRLA, conjunctiva and sclera clear  ENMT: No tonsillar erythema, exudates, or enlargement; Moist mucous membranes, Good dentition, No lesions  NECK: Supple, No JVD, Normal thyroid  NERVOUS SYSTEM:  Alert & Oriented X3, Good concentration; Motor Strength 5/5 B/L upper and lower extremities; DTRs 2+ intact and symmetric  CHEST/LUNG: Clear to percussion bilaterally; No rales, rhonchi, wheezing, or rubs  HEART: Regular rate and rhythm; No murmurs, rubs, or gallops  ABDOMEN: Soft, Nontender, Nondistended; Bowel sounds present  EXTREMITIES:  2+ Peripheral Pulses, No clubbing, cyanosis, or edema  LYMPH: No lymphadenopathy noted  SKIN: No rashes or lesions      INTERPRETATION OF TELEMETRY:    [facility]    [facility]     LABS:                        11.9   10.10 )-----------( 162      ( 20 Oct 2024 05:32 )             35.0     10-20    143  |  111[H]  |  19[H]  ----------------------------<  109[H]  3.8   |  27  |  1.01    Ca    8.3[L]      20 Oct 2024 05:32  Phos  2.2     10-20  Mg     2.2     10-20    TPro  5.2[L]  /  Alb  1.8[L]  /  TBili  0.4  /  DBili  x   /  AST  93[H]  /  ALT  69[H]  /  AlkPhos  106  10-20          Urinalysis Basic - ( 20 Oct 2024 05:32 )    Color: x / Appearance: x / SG: x / pH: x  Gluc: 109 mg/dL / Ketone: x  / Bili: x / Urobili: x   Blood: x / Protein: x / Nitrite: x   Leuk Esterase: x / RBC: x / WBC x   Sq Epi: x / Non Sq Epi: x / Bacteria: x      Lipid Panel:   I&O's Summary      RADIOLOGY & ADDITIONAL STUDIES:    Patient is a 84y old  Male who presents with a chief complaint of sepsis (19 Oct 2024 23:53)      HPI:  84-year-old man from home with hypertension, PVD, BPH, HIV (on Genvoya??) coming with his daughter after she found him on the floor.  She had been trying to get in touch with him for 3 days but was unable so came up to [place] from [place], where she had recently moved, and found him on the floor covered in feces and urine.  He was confused at the time.  He does not recall details about how he got to the floor.  Per daughter, he is independent and mentate as well at baseline. On exam, he appears to be A&O x 3 but he is very difficult to understand.  He appears very dry and sounds like he is swallowing his tongue.  His daughter has difficulty understanding him as well.    Exam is notable for very dry appearance, garbled speech, rhonchi on all anterior lung fields, saturating 88% on room air and requiring 3 L nasal cannula, bilateral lower extremity pitting edema with old and new wounds on the legs, and an abrasion on the forehead.     ED Course    Vitals: HRmax 115, O2 88% on RA, requiring 3L NC  Labs: WBC 13, plt 82, trops 550>538, lactate 2.3>2.9, BUN/Cr 39/1.26, gluc 64, , UA with RBC, granular casts, protein  Intervention: s/p zosyn, 2.5L IVF  Consults:   Images: CTH wnl; CXR with bl LL consolidations on wet read        (12 Oct 2024 21:33)      PAST MEDICAL & SURGICAL HISTORY:  HIV (human immunodeficiency virus infection)      Umbilical hernia      Essential hypertension      Cardiomegaly      Elevated PSA      Urge incontinence      PVD (peripheral vascular disease)      Shingles      Enlarged prostate      Unilateral inguinal hernia, without obstruction or gangrene, not specified as recurrent      S/P hernia repair  right inguinal - 2013      History of cystoscopy  with photovaporization, green light laser lithotripsy 2016      PVD (peripheral vascular disease)  bilateral leg stents 2020      S/P cataract surgery          PREVIOUS DIAGNOSTIC TESTING:      ECHO  FINDINGS:    STRESS  FINDINGS:    CATHETERIZATION  FINDINGS:    MEDICATIONS  (STANDING):  albuterol/ipratropium for Nebulization 3 milliLiter(s) Nebulizer every 6 hours  ascorbic acid 500 milliGRAM(s) Oral daily  folic acid 1 milliGRAM(s) Oral <User Schedule>  furosemide    Tablet 40 milliGRAM(s) Oral daily  heparin   Injectable 5000 Unit(s) SubCutaneous every 12 hours  influenza  Vaccine (HIGH DOSE) 0.5 milliLiter(s) IntraMuscular once  metoprolol tartrate 25 milliGRAM(s) Oral two times a day  multivitamin 1 Tablet(s) Oral daily  pantoprazole    Tablet 40 milliGRAM(s) Oral before breakfast  rosuvastatin 20 milliGRAM(s) Oral at bedtime  sodium chloride 3%  Inhalation 4 milliLiter(s) Inhalation every 12 hours  tamsulosin 0.4 milliGRAM(s) Oral at bedtime  vancomycin  IVPB 1000 milliGRAM(s) IV Intermittent every 24 hours    MEDICATIONS  (PRN):  aluminum hydroxide/magnesium hydroxide/simethicone Suspension 30 milliLiter(s) Oral every 4 hours PRN Dyspepsia  benzonatate 100 milliGRAM(s) Oral every 8 hours PRN Cough  guaiFENesin  milliGRAM(s) Oral every 12 hours PRN Cough  melatonin 3 milliGRAM(s) Oral at bedtime PRN Insomnia  ondansetron Injectable 4 milliGRAM(s) IV Push every 8 hours PRN Nausea and/or Vomiting      FAMILY HISTORY:  No pertinent family history in first degree relatives        SOCIAL HISTORY:    CIGARETTES:    ALCOHOL:    REVIEW OF SYSTEMS:  CONSTITUTIONAL: No fever, weight loss, or fatigue  EYES: No eye pain, visual disturbances, or discharge  ENMT:  No difficulty hearing, tinnitus, vertigo; No sinus or throat pain  NECK: No pain or stiffness  RESPIRATORY: No cough, wheezing, chills or hemoptysis; No shortness of breath  CARDIOVASCULAR: No chest pain, palpitations, dizziness, or leg swelling  GASTROINTESTINAL: No abdominal or epigastric pain. No nausea, vomiting, or hematemesis; No diarrhea or constipation. No melena or hematochezia.  GENITOURINARY: No dysuria, frequency, hematuria, or incontinence  NEUROLOGICAL: No headaches, memory loss, loss of strength, numbness, or tremors  SKIN: No itching, burning, rashes, or lesions   LYMPH NODES: No enlarged glands  ENDOCRINE: No heat or cold intolerance; No hair loss  MUSCULOSKELETAL: No joint pain or swelling; No muscle, back, or extremity pain  PSYCHIATRIC: No depression, anxiety, mood swings, or difficulty sleeping  HEME/LYMPH: No easy bruising, or bleeding gums  ALLERY AND IMMUNOLOGIC: No hives or eczema    Vital Signs Last 24 Hrs  T(C): 36.5 (20 Oct 2024 20:35), Max: 37 (19 Oct 2024 23:35)  T(F): 97.7 (20 Oct 2024 20:35), Max: 98.6 (19 Oct 2024 23:35)  HR: 98 (20 Oct 2024 20:35) (98 - 101)  BP: 110/78 (20 Oct 2024 20:35) (110/78 - 138/75)  BP(mean): --  RR: 20 (20 Oct 2024 20:35) (18 - 20)  SpO2: 96% (20 Oct 2024 20:35) (94% - 96%)    Parameters below as of 20 Oct 2024 20:35  Patient On (Oxygen Delivery Method): room air          PHYSICAL EXAM:  GENERAL: NAD, well-groomed, well-developed  HEAD:  Atraumatic, Normocephalic  EYES: EOMI, PERRLA, conjunctiva and sclera clear  ENMT: No tonsillar erythema, exudates, or enlargement; Moist mucous membranes, Good dentition, No lesions  NECK: Supple, No JVD, Normal thyroid  NERVOUS SYSTEM:  Alert & Oriented X3, Good concentration; Motor Strength 5/5 B/L upper and lower extremities; DTRs 2+ intact and symmetric  CHEST/LUNG: Clear to percussion bilaterally; No rales, rhonchi, wheezing, or rubs  HEART: Regular rate and rhythm; No murmurs, rubs, or gallops  ABDOMEN: Soft, Nontender, Nondistended; Bowel sounds present  EXTREMITIES:  2+ Peripheral Pulses, No clubbing, cyanosis, or edema  LYMPH: No lymphadenopathy noted  SKIN: No rashes or lesions      INTERPRETATION OF TELEMETRY:    [facility]    [facility]:     LABS:                        11.9   10.10 )-----------( 162      ( 20 Oct 2024 05:32 )             35.0     10-20    143  |  111[H]  |  19[H]  ----------------------------<  109[H]  3.8   |  27  |  1.01    Ca    8.3[L]      20 Oct 2024 05:32  Phos  2.2     10-20  Mg     2.2     10-20    TPro  5.2[L]  /  Alb  1.8[L]  /  TBili  0.4  /  DBili  x   /  AST  93[H]  /  ALT  69[H]  /  AlkPhos  106  10-20          Urinalysis Basic - ( 20 Oct 2024 05:32 )    Color: x / Appearance: x / SG: x / pH: x  Gluc: 109 mg/dL / Ketone: x  / Bili: x / Urobili: x   Blood: x / Protein: x / Nitrite: x   Leuk Esterase: x / RBC: x / WBC x   Sq Epi: x / Non Sq Epi: x / Bacteria: x      Lipid Panel:   I&O's Summary      RADIOLOGY & ADDITIONAL STUDIES:    CONCLUSIONS:      1. No echocardiographic evidence of vegetations.   2. Left ventricular systolic function is severely decreased with an ejection fraction visually estimated at 25 to 30 %.   3. Moderate mitral regurgitation.   4. Mild aortic regurgitation.   5. Moderate tricuspid regurgitation.   6. Trace pulmonic regurgitation.   7. Estimated pulmonary artery systolic pressure is 59 mmHg, consistent with moderatepulmonary hypertension.   8. No pericardial effusion seen.   9. Left pleural effusion noted.  10. Mild atheroma in the visualized portions of the descending aorta.  11. Compared to the transthoracic echocardiogram performed on 10/15/2024, a HEATHER was performed and additional information provided.

## 2024-10-21 LAB
ANION GAP SERPL CALC-SCNC: 6 MMOL/L — SIGNIFICANT CHANGE UP (ref 5–17)
BUN SERPL-MCNC: 19 MG/DL — HIGH (ref 7–18)
CALCIUM SERPL-MCNC: 8.2 MG/DL — LOW (ref 8.4–10.5)
CHLORIDE SERPL-SCNC: 110 MMOL/L — HIGH (ref 96–108)
CO2 SERPL-SCNC: 26 MMOL/L — SIGNIFICANT CHANGE UP (ref 22–31)
CREAT SERPL-MCNC: 1.1 MG/DL — SIGNIFICANT CHANGE UP (ref 0.5–1.3)
EGFR: 66 ML/MIN/1.73M2 — SIGNIFICANT CHANGE UP
GLUCOSE SERPL-MCNC: 118 MG/DL — HIGH (ref 70–99)
HCT VFR BLD CALC: 38.4 % — LOW (ref 39–50)
HGB BLD-MCNC: 12.6 G/DL — LOW (ref 13–17)
MCHC RBC-ENTMCNC: 31.4 PG — SIGNIFICANT CHANGE UP (ref 27–34)
MCHC RBC-ENTMCNC: 32.8 GM/DL — SIGNIFICANT CHANGE UP (ref 32–36)
MCV RBC AUTO: 95.8 FL — SIGNIFICANT CHANGE UP (ref 80–100)
NRBC # BLD: 0 /100 WBCS — SIGNIFICANT CHANGE UP (ref 0–0)
PLATELET # BLD AUTO: 183 K/UL — SIGNIFICANT CHANGE UP (ref 150–400)
POTASSIUM SERPL-MCNC: 4.2 MMOL/L — SIGNIFICANT CHANGE UP (ref 3.5–5.3)
POTASSIUM SERPL-SCNC: 4.2 MMOL/L — SIGNIFICANT CHANGE UP (ref 3.5–5.3)
RBC # BLD: 4.01 M/UL — LOW (ref 4.2–5.8)
RBC # FLD: 13.9 % — SIGNIFICANT CHANGE UP (ref 10.3–14.5)
SODIUM SERPL-SCNC: 142 MMOL/L — SIGNIFICANT CHANGE UP (ref 135–145)
WBC # BLD: 12.15 K/UL — HIGH (ref 3.8–10.5)
WBC # FLD AUTO: 12.15 K/UL — HIGH (ref 3.8–10.5)

## 2024-10-21 PROCEDURE — 99233 SBSQ HOSP IP/OBS HIGH 50: CPT

## 2024-10-21 PROCEDURE — 99232 SBSQ HOSP IP/OBS MODERATE 35: CPT

## 2024-10-21 RX ADMIN — Medication 25 MILLIGRAM(S): at 05:51

## 2024-10-21 RX ADMIN — HEPARIN SODIUM 5000 UNIT(S): 10000 INJECTION INTRAVENOUS; SUBCUTANEOUS at 05:52

## 2024-10-21 RX ADMIN — IPRATROPIUM BROMIDE AND ALBUTEROL SULFATE 3 MILLILITER(S): .5; 2.5 SOLUTION RESPIRATORY (INHALATION) at 14:37

## 2024-10-21 RX ADMIN — Medication 500 MILLIGRAM(S): at 11:17

## 2024-10-21 RX ADMIN — IPRATROPIUM BROMIDE AND ALBUTEROL SULFATE 3 MILLILITER(S): .5; 2.5 SOLUTION RESPIRATORY (INHALATION) at 08:25

## 2024-10-21 RX ADMIN — FOLIC ACID 1 MILLIGRAM(S): 1 TABLET ORAL at 08:55

## 2024-10-21 RX ADMIN — Medication 100 MILLIGRAM(S): at 21:59

## 2024-10-21 RX ADMIN — Medication 0.4 MILLIGRAM(S): at 21:59

## 2024-10-21 RX ADMIN — BICTEGRAVIR SODIUM, EMTRICITABINE, AND TENOFOVIR ALAFENAMIDE FUMARATE 1 TABLET(S): 50; 200; 25 TABLET ORAL at 11:13

## 2024-10-21 RX ADMIN — Medication 25 MILLIGRAM(S): at 17:35

## 2024-10-21 RX ADMIN — SODIUM CHLORIDE 4 MILLILITER(S): 9 INJECTION, SOLUTION INTRAMUSCULAR; INTRAVENOUS; SUBCUTANEOUS at 08:25

## 2024-10-21 RX ADMIN — FOLIC ACID 1 MILLIGRAM(S): 1 TABLET ORAL at 17:37

## 2024-10-21 RX ADMIN — HEPARIN SODIUM 5000 UNIT(S): 10000 INJECTION INTRAVENOUS; SUBCUTANEOUS at 17:36

## 2024-10-21 RX ADMIN — IPRATROPIUM BROMIDE AND ALBUTEROL SULFATE 3 MILLILITER(S): .5; 2.5 SOLUTION RESPIRATORY (INHALATION) at 20:49

## 2024-10-21 RX ADMIN — PANTOPRAZOLE SODIUM 40 MILLIGRAM(S): 40 TABLET, DELAYED RELEASE ORAL at 05:50

## 2024-10-21 RX ADMIN — VANCOMYCIN HYDROCHLORIDE 250 MILLIGRAM(S): KIT at 11:11

## 2024-10-21 RX ADMIN — Medication 20 MILLIGRAM(S): at 21:59

## 2024-10-21 RX ADMIN — Medication 1 TABLET(S): at 11:16

## 2024-10-21 RX ADMIN — Medication 40 MILLIGRAM(S): at 05:51

## 2024-10-21 RX ADMIN — SODIUM CHLORIDE 4 MILLILITER(S): 9 INJECTION, SOLUTION INTRAMUSCULAR; INTRAVENOUS; SUBCUTANEOUS at 20:49

## 2024-10-21 NOTE — PROGRESS NOTE ADULT - PROBLEM SELECTOR PLAN 2
- 2/2 pneumonia, bacteremia (Aerococcus viridans, Aerococcus urinae), multiple pressure ulcer  - Bcx (10/12)- aerococcus viridans, Aerococcus urinae  - Bcx(10/13 and 10/15)- NGTD  - TTE: EF 20-25%; Global LV hypokinesis. Severe left ventricular hypertrophy.  - HEATHER negative for vegetations with reduced EF  - Completed ceftriaxone   - C/W vancomycin until 10/27  - ID Dr And[facility]: if HEATHER negative then will give short course therapy of 2 weeks of IV antibiotics (Vanco)  - pt needs vancomycin until 10/27/24 (2 weeks after the Bcx NGTD)  - will consider hold off PICC line and d/w with a peripheral line on 10/23 with 3 more days ABX via peripheral line;

## 2024-10-21 NOTE — PROGRESS NOTE ADULT - ASSESSMENT
"Subjective:      Patient ID: Fabio Mauro is a 11 y.o. male.    Vitals:  height is 5' 1" (1.549 m) and weight is 36.3 kg (80 lb). His temperature is 101.4 °F (38.6 °C) (abnormal). His blood pressure is 116/73 and his pulse is 90. His respiration is 18 and oxygen saturation is 99%.     Chief Complaint: Fever (Cough, congestion - Entered by patient)     Patient is a 11 y.o. male who presents to urgent care with complaints of fever 101, body aches, congestion, and cough.  X2 days patient was seen in clinic for influenza A 9 days ago and symptoms had resolved prior to new onset 2 days ago.  Patient's brother was in clinic 1 week ago and was flu B positive.  Denies neck stiffness, rash, GI symptoms.  Patient did not need Tamiflu therapy for flu a as symptoms improved within a few days.  Mother requesting is Xofluza.    Fever  Associated symptoms include a fever.       Constitution: Positive for fever.      Objective:     Physical Exam   Constitutional: He appears well-developed. He is active and cooperative.  Non-toxic appearance. He does not appear ill. No distress.   HENT:   Head: Normocephalic and atraumatic. No signs of injury. There is normal jaw occlusion.   Ears:   Right Ear: Tympanic membrane, external ear and ear canal normal.   Left Ear: Tympanic membrane, external ear and ear canal normal.   Nose: Rhinorrhea and congestion present. No signs of injury. No epistaxis in the right nostril. No epistaxis in the left nostril.   Mouth/Throat: Mucous membranes are moist. Oropharynx is clear.   Eyes: Conjunctivae and lids are normal. Visual tracking is normal. Right eye exhibits no discharge and no exudate. Left eye exhibits no discharge and no exudate. No scleral icterus.   Neck: Trachea normal. Neck supple. No neck rigidity present.   Cardiovascular: Normal rate and regular rhythm.   Murmur heard.Pulses are strong.   Pulmonary/Chest: Effort normal and breath sounds normal. No respiratory distress. He has no wheezes. " He exhibits no retraction.   Abdominal: Bowel sounds are normal. He exhibits no distension. Soft. There is no abdominal tenderness.   Musculoskeletal: Normal range of motion.         General: No tenderness, deformity or signs of injury. Normal range of motion.   Neurological: He is alert.   Skin: Skin is warm, dry, not diaphoretic and no rash. Capillary refill takes less than 2 seconds. No abrasion, No burn and No bruising   Psychiatric: His speech is normal and behavior is normal.   Nursing note and vitals reviewed.      Assessment:     1. Influenza B        Plan:       Influenza B  -     POCT COVID-19 Rapid Screening  -     POCT Influenza A/B Molecular  -     baloxavir marboxiL (XOFLUZA) 40 mg tablet; Take 1 tablet (40 mg total) by mouth once. for 1 dose  Dispense: 1 tablet; Refill: 0      Flu B positive, negative COVID     Drink plenty of fluids. Get plenty of rest.   Claritin, Zyrtec, or other over-the-counter antihistamine for runny nose, postnasal drip, nasal congestion.  Nasal spray such as Nasacort or Flonase for congestion.  Over-the-counter cough medication as needed and as directed.  Over-the-counter decongestants such as Sudafed, phenylephrine or pseudoephedrine.  Avoid these if you have a history of high blood pressure.  Warm saltwater gargles for sore throat.  Warm water with honey to help coat the throat.  Throat lozenges.  Chloraseptic spray for worsening sore throat.  Tylenol or ibuprofen as needed for sore throat and fever.  May alternate every 3 hours    Call or return to clinic as needed   Go to the ER with any significant change or worsening of symptoms.   Follow up with your primary care doctor.             85yo man smoker w/ HIV (on ART) admitted for severe sepsis and bacteremia with Aerococcus, possibly from SSTI with concern for concomitant PNA and c/c/b AHRF. Found with bilateral pleural effusions, and signs of pulmonary edema on CT chest imaging; improving with abx and diuresis.     #AHRF  #PNA  #Bilateral pleural effusions w/ pulmonary edema    Recommend:  - cont broad spec ABx, defer to ID for coverage given bacteremia and multiple sources  - monitor off supplemental O2 now at rest; will eventually need ambulatory SpO2/HR measured and document in chart/flowsheets  - duonebs q6h as needed and pulmonary hygiene measures, can cont 3% HS nebs for another 1-2 days then d/c  - mobilize OOBTC and ambulate as tolerated daily  - CT chest reviewed, pulmonary edema and bilateral effusions suggest there could be a cardiogenic source of fluid overload  --> cont diuresis per primary team/cardiology for goal neg fluid balance; monitoring I/Os would be helpful  - doubt PJP PNA if pt has been compliant with ART, unable to ascertain this from speaking directly to pt with family members present

## 2024-10-21 NOTE — PROGRESS NOTE ADULT - PROBLEM SELECTOR PLAN 12
pt is from home  PT recs [facility]  Pending auth.  will consider hold off PICC line and d/w with a peripheral line on 10/23 with 3 more days ABX via peripheral line;

## 2024-10-21 NOTE — PROGRESS NOTE ADULT - PROBLEM SELECTOR PLAN 5
- not on HIV meds as per pharmacy  - CD4 % 32, CD4 count 205  - ID Dmitry reccs: Start Biktarvy 1 tab qd

## 2024-10-21 NOTE — PROGRESS NOTE ADULT - ATTENDING COMMENTS
Patient seen and examined this afternoon and later spoke with Daughter visiting from [place]     83yo m with  PMH of HTN, HD, PAD s/p bilateral LE stents, BPH, HIV, p/w ams following presumed unwitnessed fall amd prolonged unspecified  time on floor for up to 3 days possibly.; IN ED, found to be meeting severe sirs/sepsis criteria + in AHRF requiring 3 lpm via nc; admitted with suspected aspiration iso fall, dehydration    Patient found to be bacteremic with Aerococcus viridans; seen by ID being Rx with Vanco and Ceftriaxone; Patient s/p HEATHER negative and slowly but steadily improving well. coherent;  Chest still appears congested but much improved. No acute distress; denies any new complaints; leg edema appears much improved today; saturating well on room air.    Patient seen by PT recommended ORALIA; choice given; accepted to [facility]; now needing Auth Patient seen and examined this afternoon and later spoke with Daughter visiting from [place]     85yo m with  PMH of HTN, HD, PAD s/p bilateral LE stents, BPH, HIV, p/w ams following presumed unwitnessed fall amd prolonged unspecified  time on floor for up to 3 days possibly.; IN ED, found to be meeting severe sirs/sepsis criteria + in AHRF requiring 3 lpm via nc; admitted with suspected aspiration iso fall, dehydration    Patient found to be bacteremic with Aerococcus viridans; seen by ID being Rx with Vanco and Ceftriaxone; Patient s/p HEATHER negative and slowly but steadily improving well. coherent;  Chest still appears congested but much improved. No acute distress; denies any new complaints; leg edema appears much improved today; saturating well on room air.    Patient seen by PT recommended ORALIA; choice given; accepted to [facility]; now needing Auth    Vital Signs Last 24 Hrs  T(C): 36.3 (21 Oct 2024 16:00), Max: 36.7 (21 Oct 2024 07:30)  T(F): 97.3 (21 Oct 2024 16:00), Max: 98.1 (21 Oct 2024 11:09)  HR: 68 (21 Oct 2024 16:00) (68 - 98)  BP: 112/69 (21 Oct 2024 16:00) (104/65 - 138/85)  RR: 18 (21 Oct 2024 16:00) (18 - 20)  SpO2: 96% (21 Oct 2024 16:00) (92% - 100%)): room air    P/E: as above  B;L Coarse beath sounds much better  B/L LE edema much improved    Labs:                 12.6   12.15 )-----------( 183      ( 21 Oct 2024 08:10 )             38.4   10-21    142  |  110[H]  |  19[H]  ----------------------------<  118[H]  4.2   |  26  |  1.10  Ca    8.2[L]      21 Oct 2024 08:10  Phos  2.2     10-20  Mg     2.2     10-20  TPro  5.2[L]  /  Alb  1.8[L]  /  TBili  0.4  /  DBili  x   /  AST  93[H]  /  ALT  69[H]  /  AlkPhos  106  10-20    A/P:  Sepsis with Bacteremia due to Aerococcus viridans source possibly  ?LE Ulcers   concern for Endocarditis ruled out (s/p HEATHER)  A. Fib with RVR ppt by severe infection much improved HR  Acute Hypoxic Resp. 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 Venous insufficiency   Lactic acidosis possibly starvation ketoacidosis resolved  Hypernatremia secondary to dehydration resolved  Rhabdomyolysis due to fall and prolonged immobilization  Hx PAD with B/L stents  Elevated troponin likely demand ischemia    Plan:  Continue IV Vanco and Ceftriaxone for now; Vanco trough therapeutic;   ID follow up appreciated; d/w Dr. Andres; Last dose ABX 10/27; only 6 more days from tomorrow; Vanco trough   Patient needing ORALIA; as we are pending Auth which will take 1 to 2 days; will hold off PICC line and d/w with a peripheral line on d/c with  ABX via peripheral line; HEATHER negative for Endocarditis;  treat current bacteremia for 2 weeks from negative culture from 10/13  Cardiology f/u  better controlled HR s/p Digoxin and resuming low dose Metoprolol 25 mg q 12 hrs; Right hemiparesis,  Neuro eval appreciated; d/w Dr. More; MRI with and without contrast given Hx HIV; negative with no stroke or structural pathology   Transition to oral Lasix  40 mg daily oral; monitor renal fn closley  Nutrition consultation; Add Ensure BID; Viral load undetectable CD4 count acceptable >200  PT/ OT eval; will need ORALIA placement; Daughter has choices already; [facility]; Send for Auth  Social work/ Case mgmt consult for disposition once medically stable;   .  Discussed with Patient and  Daughter at bedside and reviewed findings and plan of care as above  Discussed with NP covering  and RN/ Pulmonary/   I will be away 10/22//24 onwards; Hospitalist Colleague to assume care AM Patient seen and examined this afternoon and later spoke with Daughter visiting from [place]     85yo m with  PMH of HTN, HD, PAD s/p bilateral LE stents, BPH, HIV, p/w ams following presumed unwitnessed fall amd prolonged unspecified  time on floor for up to 3 days possibly.; IN ED, found to be meeting severe sirs/sepsis criteria + in AHRF requiring 3 lpm via nc; admitted with suspected aspiration iso fall, dehydration    Patient found to be bacteremic with Aerococcus viridans; seen by ID being Rx with Vanco and Ceftriaxone; Patient s/p HEATHER negative and slowly but steadily improving well. coherent;  Chest still appears congested but much improved. No acute distress; denies any new complaints; leg edema appears much improved today; saturating well on room air.    Patient seen by PT recommended ORALIA; choice given; accepted to [facility]; now needing Auth    Vital Signs Last 24 Hrs  T(C): 36.3 (21 Oct 2024 16:00), Max: 36.7 (21 Oct 2024 07:30)  T(F): 97.3 (21 Oct 2024 16:00), Max: 98.1 (21 Oct 2024 11:09)  HR: 68 (21 Oct 2024 16:00) (68 - 98)  BP: 112/69 (21 Oct 2024 16:00) (104/65 - 138/85)  RR: 18 (21 Oct 2024 16:00) (18 - 20)  SpO2: 96% (21 Oct 2024 16:00) (92% - 100%)): room air    P/E: as above  B;L Coarse beath sounds much better  B/L LE edema much improved    Labs:                 12.6   12.15 )-----------( 183      ( 21 Oct 2024 08:10 )             38.4   10-21    142  |  110[H]  |  19[H]  ----------------------------<  118[H]  4.2   |  26  |  1.10  Ca    8.2[L]      21 Oct 2024 08:10  Phos  2.2     10-20  Mg     2.2     10-20  TPro  5.2[L]  /  Alb  1.8[L]  /  TBili  0.4  /  DBili  x   /  AST  93[H]  /  ALT  69[H]  /  AlkPhos  106  10-20    A/P:  Sepsis with Bacteremia due to Aerococcus viridans source possibly  ?LE Ulcers   concern for Endocarditis ruled out (s/p HEATHER)  A. Fib with RVR ppt by severe infection much improved HR  Acute Hypoxic Resp. 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 Venous insufficiency   Lactic acidosis possibly starvation ketoacidosis resolved  Hypernatremia secondary to dehydration resolved  Rhabdomyolysis due to fall and prolonged immobilization  Hx PAD with B/L stents  Elevated troponin likely demand ischemia    Plan:  Continue IV Vanco and Ceftriaxone for now; Vanco trough therapeutic;   ID follow up appreciated; d/w Dr. Andres; Last dose ABX 10/27; only 6 more days from tomorrow; Vanco trough   d/w ID also will start Biktarvy back (resumed yesterday)  Patient needing ORALIA; as we are pending Auth which will take 1 to 2 days; will hold off PICC line and d/w with a peripheral line on d/c with  ABX via peripheral line; HEATHER negative for Endocarditis;  treat current bacteremia for 2 weeks from negative culture from 10/13  Cardiology f/u  better controlled HR s/p Digoxin and resuming low dose Metoprolol 25 mg q 12 hrs; Right hemiparesis,  Neuro eval appreciated; d/w Dr. More; MRI with and without contrast given Hx HIV; negative with no stroke or structural pathology   Transition to oral Lasix  40 mg daily oral; monitor renal fn closley  Nutrition consultation; Add Ensure BID; Viral load undetectable CD4 count acceptable >200  PT/ OT eval; will need ORALIA placement; Daughter has choices already; [facility] rehab accepted; Send for Auth  Social work/ Case mgmt consult for disposition once medically stable;   .  Discussed with Patient and  Daughter at bedside and reviewed findings and plan of care as above  Discussed with NP covering  and RN/ Pulmonary/   I will be away 10/22//24 onwards; Hospitalist Colleague to assume care AM

## 2024-10-21 NOTE — PROGRESS NOTE ADULT - PROBLEM SELECTOR PLAN 1
- p/w O2 88% on room air; secondary to suspected aspiration pneumonia  - CXR (10/12)- Hazy bibasilar opacities suggestive of multifocal pneumonia.  - Cxr (10/14) increased bibasilar fluid accumulation  - s/p Lasix IVP -- CXR 10/17: no gross interval change on B/L congestive changes and pleural effusions  - c/w ceftriaxone and vancomycin  - completed azithromycin  - continue Chest PT, Duoneb & incentive spirometer  - wean off O2 to maintain SPO2 >92% -- now on room air  - SLP recs Minced & Moist solids w/ thin liquids  - pulm consulted (Dr Mehta)

## 2024-10-21 NOTE — PROGRESS NOTE ADULT - SUBJECTIVE AND OBJECTIVE BOX
PULMONARY CONSULT SERVICE FOLLOW-UP NOTE    INTERVAL HPI:  Reviewed chart and overnight events; patient seen and examined at bedside. Feels improvement since the weekend, less dyspneic and less coughing overall. Still has some production of clear / pink mucus. No CP or pressure. No wheezing. No hemoptysis. Was out of bed in the chair earlier this morning.    MEDICATIONS:  Pulmonary:  albuterol/ipratropium for Nebulization 3 milliLiter(s) Nebulizer every 6 hours  benzonatate 100 milliGRAM(s) Oral every 8 hours PRN  guaiFENesin  milliGRAM(s) Oral every 12 hours PRN  sodium chloride 3%  Inhalation 4 milliLiter(s) Inhalation every 12 hours    Antimicrobials:  bictegravir 50 mG/emtricitabine 200 mG/tenofovir alafenamide 25 mG (BIKTARVY) 1 Tablet(s) Oral daily  vancomycin  IVPB 1000 milliGRAM(s) IV Intermittent every 24 hours    Anticoagulants:  heparin   Injectable 5000 Unit(s) SubCutaneous every 12 hours    Cardiac:  furosemide    Tablet 40 milliGRAM(s) Oral daily  metoprolol tartrate 25 milliGRAM(s) Oral two times a day    Allergies    No Known Allergies    Intolerances    Vital Signs Last 24 Hrs  T(C): 36.7 (21 Oct 2024 11:09), Max: 36.7 (21 Oct 2024 07:30)  T(F): 98.1 (21 Oct 2024 11:09), Max: 98.1 (21 Oct 2024 11:09)  HR: 96 (21 Oct 2024 12:00) (80 - 100)  BP: 138/85 (21 Oct 2024 12:00) (104/65 - 138/85)  BP(mean): --  RR: 18 (21 Oct 2024 11:09) (18 - 20)  SpO2: 100% (21 Oct 2024 12:00) (92% - 100%)    Parameters below as of 21 Oct 2024 12:00  Patient On (Oxygen Delivery Method): room air    10-21 @ 07:01  -  10-21 @ 12:45  --------------------------------------------------------  IN: 200 mL / OUT: 0 mL / NET: 200 mL    PHYSICAL EXAM:  Constitutional: chronically ill and frail appearing man resting in bed; NAD  HEENT: atraumatic; PERRL, anicteric sclera; MMM  Neck: supple  Cardiovascular: +S1/S2, RRR  Respiratory: diminished bibasilar BS w/ few scattered crackles in upper fields  Gastrointestinal: soft, NT/ND  Extremities: WWP; 1+ LE pitting edema b/l; no clubbing or cyanosis  Vascular: 2+ radial pulses B/L  Neurological: awake and alert; ARMIJO    LABS:  CBC Full  -  ( 21 Oct 2024 08:10 )  WBC Count : 12.15 K/uL  RBC Count : 4.01 M/uL  Hemoglobin : 12.6 g/dL  Hematocrit : 38.4 %  Platelet Count - Automated : 183 K/uL  Mean Cell Volume : 95.8 fl  Mean Cell Hemoglobin : 31.4 pg  Mean Cell Hemoglobin Concentration : 32.8 gm/dL  Auto Neutrophil # : x  Auto Lymphocyte # : x  Auto Monocyte # : x  Auto Eosinophil # : x  Auto Basophil # : x  Auto Neutrophil % : x  Auto Lymphocyte % : x  Auto Monocyte % : x  Auto Eosinophil % : x  Auto Basophil % : x    10-21    142  |  110[H]  |  19[H]  ----------------------------<  118[H]  4.2   |  26  |  1.10    Ca    8.2[L]      21 Oct 2024 08:10  Phos  2.2     10-20  Mg     2.2     10-20    TPro  5.2[L]  /  Alb  1.8[L]  /  TBili  0.4  /  DBili  x   /  AST  93[H]  /  ALT  69[H]  /  AlkPhos  106  10-20    Urinalysis Basic - ( 21 Oct 2024 08:10 )    Color: x / Appearance: x / SG: x / pH: x  Gluc: 118 mg/dL / Ketone: x  / Bili: x / Urobili: x   Blood: x / Protein: x / Nitrite: x   Leuk Esterase: x / RBC: x / WBC x   Sq Epi: x / Non Sq Epi: x / Bacteria: x    RADIOLOGY & ADDITIONAL STUDIES:  No interval chest study for review

## 2024-10-21 NOTE — PROGRESS NOTE ADULT - SUBJECTIVE AND OBJECTIVE BOX
LATE ENTRY NOTE FOR 10/20/24  Patient seen and examined 10/20/24 AROUND 11 AM and later spoke with Daughter visiting from [place] later in the afternoon    85yo m with  PMH of HTN, HD, PAD s/p bilateral LE stents, BPH, HIV, p/w ams following presumed unwitnessed fall amd prolonged unsuspecified  time on floor for up to 3 days possibly.; IN ED, found to be meeting severe sirs/sepsis criteria + in AHRF requiring 3 lpm via nc; admitted with suspected aspiration iso fall, dehydration    Patient found to be bacteremic with Aerococcus viridans; seen by ID being Rx with Vanco and Ceftraxone; Patient s/p HEATHER negative and slowly but steadily improving well. coherent;  Chest still appears congested but much improved. No acute distress; denies any new complaints; leg edema appears much improved today;     Vital Signs Last 24 Hrs  T(C): 36.3 (20 Oct 2024 23:12), Max: 37 (20 Oct 2024 04:30)  T(F): 97.3 (20 Oct 2024 23:12), Max: 98.6 (20 Oct 2024 04:30)  HR: 95 (20 Oct 2024 23:12) (95 - 100)  BP: 107/68 (20 Oct 2024 23:12) (107/68 - 138/75)  RR: 18 (20 Oct 2024 23:12) (18 - 20)  SpO2: 94% (20 Oct 2024 23:12) (94% - 96%) room air    P/E:  elderly male, appear chronically ill; NAD   Neuro: AAO x 3; improved mentation  Chest : B/L Coarse breath sounds improving   CVS: S1S2 present  Abdomen, soft, BS+, NT; mildly distended  Ext: significant edema of RUE and RLE with skin breakdown, right (may be portal of entry for bacteremia)    Labs:                                          11.9   10.10 )-----------( 162      ( 20 Oct 2024 05:32 )             35.0     10-20  143  |  111[H]  |  19[H]  ----------------------------<  109[H]  3.8   |  27  |  1.01  Ca    8.3[L]      20 Oct 2024 05:32  Phos  2.2     10-20  Mg     2.2     10-20  TPro  5.2[L]  /  Alb  1.8[L]  /  TBili  0.4  /  DBili  x   /  AST  93[H]  /  ALT  69[H]  /  AlkPhos  106  10-20  Vancomycin Level, Trough (10.20.24 @ 10:20) Vancomycin Level, Trough: 15.1  Folate, Serum (10.14.24 @ 06:40) Folate, Serum: <2.0: Test Repeated ng/mL  Thyroid Stimulating Hormone, Serum: 2.25 uU/mL (10.14.24 @ 06:40)     Culture - Blood (10.13.24 @ 13:30)   Culture Results: No growth at 5 days    Culture - Blood (10.12.24 @ 15:15)   Growth in aerobic and anaerobic bottles: Aerococcus viridans   Growth in anaerobic bottle: Aerococcus urinae    < from: MR Head w/wo IV Cont (10.17.24 @ 16:46) >  IMPRESSION:  Mild soft tissue swelling the anterior right frontal scalp/forehead.  Otherwise no MRI evidence of acute intracranial pathology.  Age-appropriate involutional changes.  Chronic small vessel ischemic changes as discussed above.    A/P:  Sepsis with Bacteremia due to Aerococcus viridans etiology ?LE Ulcers   concern for Endocarditis ruled out  A. Fib with RVR ppt by severe infection much improved HR  Acute Hypoxic Resp. failure with Suspected Aspiration Pneumonia slowly improving  Right facial droop and Right hemiparesis concern for stroke ruled out  Severe folate deficiency  B/L LE edema etiology ?? Venous insufficiency   Lactic acidosis possibly starvation ketoacidosis resolved  Hypernatremia secondary to dehydration resolved  Rhabdomyolysis due to fall and prolonged immobilization  Hx PAD with B/L stents  Elevated troponin likely demand ischemia    Plan:  Continue IV Vanco and Ceftriaxone for now; Vanco trough therapeutic;   ID follow up appreciated; d/w Dr. Andres; Last dose ABX 10/27; only 6 more days from tomorrow;   As we are pending Auth which will take 1 to 2 days; will consider hold off PICC line and d/w with a peripheral line on 10/23 with 3 more days ABX via peripheral line; HEATHER negative for Endocarditis;  treat current bacteremia for 2 weeks from negative culture from 10/13  Cardiology f/u  better controlled HR s/p Digoxin and resuming low dose Metoprolol 25 mg q 12 hrs; Right hemiparesis, r/o CNS emboli vs CNS mass; Neuro eval appreciated; d/w Dr. More; MRI with and without contrast given Hx HIV; negative  Lasix 40 mg IV given today; Transition to oral Lasix tomorrow; 40 mg daily oral  Nutrition consultation; Add Ensure BID; Viral load undetectable CD$ count acceptable >200  PT/ OT eval; will need ORALIA placement; Daughter has choices already  Social work/ Case mgmt consult for disposition once medically stable;   .  Discussed with Patient and  Daughter at bedside and reviewed findings and plan of care as above  Discussed with NP covering Julius and RN

## 2024-10-21 NOTE — PROGRESS NOTE ADULT - PROBLEM SELECTOR PROBLEM 1
Acute hypoxic respiratory failure
Systemic inflammatory response syndrome (SIRS)
Acute hypoxic respiratory failure
Acute hypoxic respiratory failure
Bacteremia due to Gram-positive bacteria
Acute hypoxic respiratory failure
Acute hypoxic respiratory failure

## 2024-10-21 NOTE — PROGRESS NOTE ADULT - SUBJECTIVE AND OBJECTIVE BOX
NP Note discussed with  primary attending    Patient is a 84y old  Male who presents with a chief complaint of sepsis (21 Oct 2024 00:15)      INTERVAL HPI/OVERNIGHT EVENTS: no new complaints    MEDICATIONS  (STANDING):  albuterol/ipratropium for Nebulization 3 milliLiter(s) Nebulizer every 6 hours  ascorbic acid 500 milliGRAM(s) Oral daily  bictegravir 50 mG/emtricitabine 200 mG/tenofovir alafenamide 25 mG (BIKTARVY) 1 Tablet(s) Oral daily  folic acid 1 milliGRAM(s) Oral <User Schedule>  furosemide    Tablet 40 milliGRAM(s) Oral daily  heparin   Injectable 5000 Unit(s) SubCutaneous every 12 hours  influenza  Vaccine (HIGH DOSE) 0.5 milliLiter(s) IntraMuscular once  metoprolol tartrate 25 milliGRAM(s) Oral two times a day  multivitamin 1 Tablet(s) Oral daily  pantoprazole    Tablet 40 milliGRAM(s) Oral before breakfast  rosuvastatin 20 milliGRAM(s) Oral at bedtime  sodium chloride 3%  Inhalation 4 milliLiter(s) Inhalation every 12 hours  tamsulosin 0.4 milliGRAM(s) Oral at bedtime  vancomycin  IVPB 1000 milliGRAM(s) IV Intermittent every 24 hours    MEDICATIONS  (PRN):  aluminum hydroxide/magnesium hydroxide/simethicone Suspension 30 milliLiter(s) Oral every 4 hours PRN Dyspepsia  benzonatate 100 milliGRAM(s) Oral every 8 hours PRN Cough  guaiFENesin  milliGRAM(s) Oral every 12 hours PRN Cough  melatonin 3 milliGRAM(s) Oral at bedtime PRN Insomnia  ondansetron Injectable 4 milliGRAM(s) IV Push every 8 hours PRN Nausea and/or Vomiting      __________________________________________________  REVIEW OF SYSTEMS:    CONSTITUTIONAL: No fever,   EYES: no acute visual disturbances  NECK: No pain or stiffness  RESPIRATORY: No cough; No shortness of breath  CARDIOVASCULAR: No chest pain, no palpitations  GASTROINTESTINAL: No pain. No nausea or vomiting; No diarrhea   NEUROLOGICAL: No headache or numbness, no tremors  MUSCULOSKELETAL: No joint pain, no muscle pain  GENITOURINARY: no dysuria, no frequency, no hesitancy  PSYCHIATRY: no depression , no anxiety  ALL OTHER  ROS negative        Vital Signs Last 24 Hrs  T(C): 36.3 (21 Oct 2024 16:00), Max: 36.7 (21 Oct 2024 07:30)  T(F): 97.3 (21 Oct 2024 16:00), Max: 98.1 (21 Oct 2024 11:09)  HR: 68 (21 Oct 2024 16:00) (68 - 98)  BP: 112/69 (21 Oct 2024 16:00) (104/65 - 138/85)  BP(mean): --  RR: 18 (21 Oct 2024 16:00) (18 - 20)  SpO2: 96% (21 Oct 2024 16:00) (92% - 100%)    Parameters below as of 21 Oct 2024 16:00  Patient On (Oxygen Delivery Method): room air        ________________________________________________  PHYSICAL EXAM:  GENERAL: NAD  HEENT: Normocephalic;  conjunctivae and sclerae clear; moist mucous membranes;   NECK : supple  CHEST/LUNG: Clear to ausculitation bilaterally with good air entry   HEART: S1 S2  regular; no murmurs, gallops or rubs  ABDOMEN: Soft, Nontender, Nondistended; Bowel sounds present  EXTREMITIES: no cyanosis; no edema; no calf tenderness  SKIN: warm and dry; no rash  NERVOUS SYSTEM:  Awake and alert; Oriented  to place, person and time ; no new deficits    _________________________________________________  LABS:                        12.6   12.15 )-----------( 183      ( 21 Oct 2024 08:10 )             38.4     10-21    142  |  110[H]  |  19[H]  ----------------------------<  118[H]  4.2   |  26  |  1.10    Ca    8.2[L]      21 Oct 2024 08:10  Phos  2.2     10-20  Mg     2.2     10-20    TPro  5.2[L]  /  Alb  1.8[L]  /  TBili  0.4  /  DBili  x   /  AST  93[H]  /  ALT  69[H]  /  AlkPhos  106  10-20      Urinalysis Basic - ( 21 Oct 2024 08:10 )    Color: x / Appearance: x / SG: x / pH: x  Gluc: 118 mg/dL / Ketone: x  / Bili: x / Urobili: x   Blood: x / Protein: x / Nitrite: x   Leuk Esterase: x / RBC: x / WBC x   Sq Epi: x / Non Sq Epi: x / Bacteria: x      CAPILLARY BLOOD GLUCOSE            RADIOLOGY & ADDITIONAL TESTS:  < from: CT Chest No Cont (10.18.24 @ 16:09) >  IMPRESSION:  Bilateral pleural effusions.  Cardiomegaly.  Pulmonary edema with probable pneumonia involving right middle lobe and   scattered opacities bilateral lower lobes.          < end of copied text >    Imaging Personally Reviewed:  YES    Consultant(s) Notes Reviewed:   YES    Care Discussed with Consultants :     Plan of care was discussed with patient and /or primary care giver; all questions and concerns were addressed and care was aligned with patient's wishes.

## 2024-10-21 NOTE — PROGRESS NOTE ADULT - SUBJECTIVE AND OBJECTIVE BOX
PATIENT SEEN AND EXAMINED BY ADEN ZHANG M.D. ON :- 10/21/24  DATE OF SERVICE:        10/21/24     Interim events noted,Labs ,Radiological studies and Cardiology tests reviewed .    Patient is a 84y old  Male who presents with a chief complaint of sepsis (21 Oct 2024 16:40)      HPI:  84-year-old man from home with hypertension, PVD, BPH, HIV (on Genvoya??) coming with his daughter after she found him on the floor.  She had been trying to get in touch with him for 3 days but was unable so came up to [place] from [place], where she had recently moved, and found him on the floor covered in feces and urine.  He was confused at the time.  He does not recall details about how he got to the floor.  Per daughter, he is independent and mentate as well at baseline. On exam, he appears to be A&O x 3 but he is very difficult to understand.  He appears very dry and sounds like he is swallowing his tongue.  His daughter has difficulty understanding him as well.    Exam is notable for very dry appearance, garbled speech, rhonchi on all anterior lung fields, saturating 88% on room air and requiring 3 L nasal cannula, bilateral lower extremity pitting edema with old and new wounds on the legs, and an abrasion on the forehead.     ED Course    Vitals: HRmax 115, O2 88% on RA, requiring 3L NC  Labs: WBC 13, plt 82, trops 550>538, lactate 2.3>2.9, BUN/Cr 39/1.26, gluc 64, , UA with RBC, granular casts, protein  Intervention: s/p zosyn, 2.5L IVF  Consults:   Images: CTH wnl; CXR with bl LL consolidations on wet read        (12 Oct 2024 21:33)      PAST MEDICAL & SURGICAL HISTORY:  HIV (human immunodeficiency virus infection)      Umbilical hernia      Essential hypertension      Cardiomegaly      Elevated PSA      Urge incontinence      PVD (peripheral vascular disease)      Shingles      Enlarged prostate      Unilateral inguinal hernia, without obstruction or gangrene, not specified as recurrent      S/P hernia repair  right inguinal - 2013      History of cystoscopy  with photovaporization, green light laser lithotripsy 2016      PVD (peripheral vascular disease)  bilateral leg stents 2020      S/P cataract surgery          PREVIOUS DIAGNOSTIC TESTING:      ECHO  FINDINGS:    STRESS  FINDINGS:    CATHETERIZATION  FINDINGS:    MEDICATIONS  (STANDING):  albuterol/ipratropium for Nebulization 3 milliLiter(s) Nebulizer every 6 hours  ascorbic acid 500 milliGRAM(s) Oral daily  bictegravir 50 mG/emtricitabine 200 mG/tenofovir alafenamide 25 mG (BIKTARVY) 1 Tablet(s) Oral daily  folic acid 1 milliGRAM(s) Oral <User Schedule>  furosemide    Tablet 40 milliGRAM(s) Oral daily  heparin   Injectable 5000 Unit(s) SubCutaneous every 12 hours  influenza  Vaccine (HIGH DOSE) 0.5 milliLiter(s) IntraMuscular once  metoprolol tartrate 25 milliGRAM(s) Oral two times a day  multivitamin 1 Tablet(s) Oral daily  pantoprazole    Tablet 40 milliGRAM(s) Oral before breakfast  rosuvastatin 20 milliGRAM(s) Oral at bedtime  sodium chloride 3%  Inhalation 4 milliLiter(s) Inhalation every 12 hours  tamsulosin 0.4 milliGRAM(s) Oral at bedtime  vancomycin  IVPB 1000 milliGRAM(s) IV Intermittent every 24 hours    MEDICATIONS  (PRN):  aluminum hydroxide/magnesium hydroxide/simethicone Suspension 30 milliLiter(s) Oral every 4 hours PRN Dyspepsia  benzonatate 100 milliGRAM(s) Oral every 8 hours PRN Cough  guaiFENesin  milliGRAM(s) Oral every 12 hours PRN Cough  melatonin 3 milliGRAM(s) Oral at bedtime PRN Insomnia  ondansetron Injectable 4 milliGRAM(s) IV Push every 8 hours PRN Nausea and/or Vomiting      FAMILY HISTORY:  No pertinent family history in first degree relatives        SOCIAL HISTORY:    CIGARETTES:    ALCOHOL:    REVIEW OF SYSTEMS:  CONSTITUTIONAL: No fever, weight loss, or fatigue  EYES: No eye pain, visual disturbances, or discharge  ENMT:  No difficulty hearing, tinnitus, vertigo; No sinus or throat pain  NECK: No pain or stiffness  RESPIRATORY: No cough, wheezing, chills or hemoptysis; No shortness of breath  CARDIOVASCULAR: No chest pain, palpitations, dizziness, or leg swelling  GASTROINTESTINAL: No abdominal or epigastric pain. No nausea, vomiting, or hematemesis; No diarrhea or constipation. No melena or hematochezia.  GENITOURINARY: No dysuria, frequency, hematuria, or incontinence  NEUROLOGICAL: No headaches, memory loss, loss of strength, numbness, or tremors  SKIN: No itching, burning, rashes, or lesions   LYMPH NODES: No enlarged glands  ENDOCRINE: No heat or cold intolerance; No hair loss  MUSCULOSKELETAL: No joint pain or swelling; No muscle, back, or extremity pain  PSYCHIATRIC: No depression, anxiety, mood swings, or difficulty sleeping  HEME/LYMPH: No easy bruising, or bleeding gums  ALLERY AND IMMUNOLOGIC: No hives or eczema    Vital Signs Last 24 Hrs  T(C): 36.4 (21 Oct 2024 21:03), Max: 36.7 (21 Oct 2024 07:30)  T(F): 97.5 (21 Oct 2024 21:03), Max: 98.1 (21 Oct 2024 11:09)  HR: 95 (21 Oct 2024 21:03) (68 - 96)  BP: 104/66 (21 Oct 2024 21:03) (104/65 - 138/85)  BP(mean): --  RR: 18 (21 Oct 2024 21:03) (18 - 18)  SpO2: 95% (21 Oct 2024 21:03) (92% - 100%)    Parameters below as of 21 Oct 2024 21:03  Patient On (Oxygen Delivery Method): room air          PHYSICAL EXAM:  GENERAL: NAD, well-groomed, well-developed  HEAD:  Atraumatic, Normocephalic  EYES: EOMI, PERRLA, conjunctiva and sclera clear  ENMT: No tonsillar erythema, exudates, or enlargement; Moist mucous membranes, Good dentition, No lesions  NECK: Supple, No JVD, Normal thyroid  NERVOUS SYSTEM:  Alert & Oriented X3, Good concentration; Motor Strength 5/5 B/L upper and lower extremities; DTRs 2+ intact and symmetric  CHEST/LUNG: Clear to percussion bilaterally; No rales, rhonchi, wheezing, or rubs  HEART: Regular rate and rhythm; No murmurs, rubs, or gallops  ABDOMEN: Soft, Nontender, Nondistended; Bowel sounds present  EXTREMITIES:  2+ Peripheral Pulses, No clubbing, cyanosis, or edema  LYMPH: No lymphadenopathy noted  SKIN: No rashes or lesions      INTERPRETATION OF TELEMETRY:    ECG:    [facility]:     LABS:                        12.6   12.15 )-----------( 183      ( 21 Oct 2024 08:10 )             38.4     10-21    142  |  110[H]  |  19[H]  ----------------------------<  118[H]  4.2   |  26  |  1.10    Ca    8.2[L]      21 Oct 2024 08:10  Phos  2.2     10-20  Mg     2.2     10-20    TPro  5.2[L]  /  Alb  1.8[L]  /  TBili  0.4  /  DBili  x   /  AST  93[H]  /  ALT  69[H]  /  AlkPhos  106  10-20          Urinalysis Basic - ( 21 Oct 2024 08:10 )    Color: x / Appearance: x / SG: x / pH: x  Gluc: 118 mg/dL / Ketone: x  / Bili: x / Urobili: x   Blood: x / Protein: x / Nitrite: x   Leuk Esterase: x / RBC: x / WBC x   Sq Epi: x / Non Sq Epi: x / Bacteria: x      Lipid Panel:   I&O's Summary    21 Oct 2024 07:01  -  21 Oct 2024 22:42  --------------------------------------------------------  IN: 430 mL / OUT: 0 mL / NET: 430 mL        RADIOLOGY & ADDITIONAL STUDIES:      CONCLUSIONS:      1. No echocardiographic evidence of vegetations.   2. Left ventricular systolic function is severely decreased with an ejection fraction visually estimated at 25 to 30 %.   3. Moderate mitral regurgitation.   4. Mild aortic regurgitation.   5. Moderate tricuspid regurgitation.   6. Trace pulmonic regurgitation.   7. Estimated pulmonary artery systolic pressure is 59 mmHg, consistent with moderatepulmonary hypertension.   8. No pericardial effusion seen.   9. Left pleural effusion noted.  10. Mild atheroma in the visualized portions of the descending aorta.  11. Compared to the transthoracic echocardiogram performed on 10/15/2024, a HEATHER was performed and additional information provided.

## 2024-10-22 ENCOUNTER — TRANSCRIPTION ENCOUNTER (OUTPATIENT)
Age: 84
End: 2024-10-22

## 2024-10-22 VITALS
RESPIRATION RATE: 18 BRPM | TEMPERATURE: 99 F | HEART RATE: 99 BPM | OXYGEN SATURATION: 94 % | DIASTOLIC BLOOD PRESSURE: 60 MMHG | SYSTOLIC BLOOD PRESSURE: 110 MMHG

## 2024-10-22 PROCEDURE — 99232 SBSQ HOSP IP/OBS MODERATE 35: CPT

## 2024-10-22 PROCEDURE — 99239 HOSP IP/OBS DSCHRG MGMT >30: CPT | Mod: GC

## 2024-10-22 RX ORDER — ASCORBIC ACID 500 MG
1 TABLET ORAL
Qty: 0 | Refills: 0 | DISCHARGE
Start: 2024-10-22

## 2024-10-22 RX ORDER — FUROSEMIDE 40 MG
1 TABLET ORAL
Qty: 0 | Refills: 0 | DISCHARGE
Start: 2024-10-22

## 2024-10-22 RX ORDER — FOLIC ACID 1 MG/1
1 TABLET ORAL
Qty: 0 | Refills: 0 | DISCHARGE
Start: 2024-10-22

## 2024-10-22 RX ORDER — ROSUVASTATIN CALCIUM 10 MG
1 TABLET ORAL
Qty: 0 | Refills: 0 | DISCHARGE
Start: 2024-10-22

## 2024-10-22 RX ORDER — ELVITEGRAVIR, COBICISTAT, EMTRICITABINE, AND TENOFOVIR DISOPROXIL FUMARATE 150; 150; 200; 300 MG/1; MG/1; MG/1; MG/1
1 TABLET, FILM COATED ORAL DAILY
Refills: 0 | Status: DISCONTINUED | OUTPATIENT
Start: 2024-10-23 | End: 2024-10-22

## 2024-10-22 RX ORDER — VANCOMYCIN HYDROCHLORIDE 50 MG/ML
1 KIT ORAL
Qty: 0 | Refills: 0 | DISCHARGE
Start: 2024-10-22

## 2024-10-22 RX ORDER — BICTEGRAVIR SODIUM, EMTRICITABINE, AND TENOFOVIR ALAFENAMIDE FUMARATE 50; 200; 25 MG/1; MG/1; MG/1
1 TABLET ORAL
Qty: 30 | Refills: 0
Start: 2024-10-22 | End: 2024-11-20

## 2024-10-22 RX ORDER — B-COMPLEX WITH VITAMIN C
1 VIAL (ML) INJECTION
Qty: 0 | Refills: 0 | DISCHARGE
Start: 2024-10-22

## 2024-10-22 RX ORDER — PANTOPRAZOLE SODIUM 40 MG/1
1 TABLET, DELAYED RELEASE ORAL
Qty: 0 | Refills: 0 | DISCHARGE
Start: 2024-10-22

## 2024-10-22 RX ORDER — METOPROLOL TARTRATE 50 MG
1 TABLET ORAL
Qty: 0 | Refills: 0 | DISCHARGE
Start: 2024-10-22

## 2024-10-22 RX ADMIN — Medication 500 MILLIGRAM(S): at 12:34

## 2024-10-22 RX ADMIN — FOLIC ACID 1 MILLIGRAM(S): 1 TABLET ORAL at 18:26

## 2024-10-22 RX ADMIN — Medication 1 TABLET(S): at 12:34

## 2024-10-22 RX ADMIN — VANCOMYCIN HYDROCHLORIDE 250 MILLIGRAM(S): KIT at 11:36

## 2024-10-22 RX ADMIN — FOLIC ACID 1 MILLIGRAM(S): 1 TABLET ORAL at 10:14

## 2024-10-22 RX ADMIN — Medication 25 MILLIGRAM(S): at 18:25

## 2024-10-22 RX ADMIN — BICTEGRAVIR SODIUM, EMTRICITABINE, AND TENOFOVIR ALAFENAMIDE FUMARATE 1 TABLET(S): 50; 200; 25 TABLET ORAL at 12:34

## 2024-10-22 RX ADMIN — PANTOPRAZOLE SODIUM 40 MILLIGRAM(S): 40 TABLET, DELAYED RELEASE ORAL at 05:41

## 2024-10-22 RX ADMIN — IPRATROPIUM BROMIDE AND ALBUTEROL SULFATE 3 MILLILITER(S): .5; 2.5 SOLUTION RESPIRATORY (INHALATION) at 08:49

## 2024-10-22 RX ADMIN — HEPARIN SODIUM 5000 UNIT(S): 10000 INJECTION INTRAVENOUS; SUBCUTANEOUS at 05:40

## 2024-10-22 RX ADMIN — Medication 40 MILLIGRAM(S): at 05:41

## 2024-10-22 RX ADMIN — Medication 25 MILLIGRAM(S): at 05:41

## 2024-10-22 RX ADMIN — IPRATROPIUM BROMIDE AND ALBUTEROL SULFATE 3 MILLILITER(S): .5; 2.5 SOLUTION RESPIRATORY (INHALATION) at 16:05

## 2024-10-22 RX ADMIN — SODIUM CHLORIDE 4 MILLILITER(S): 9 INJECTION, SOLUTION INTRAMUSCULAR; INTRAVENOUS; SUBCUTANEOUS at 08:50

## 2024-10-22 RX ADMIN — IPRATROPIUM BROMIDE AND ALBUTEROL SULFATE 3 MILLILITER(S): .5; 2.5 SOLUTION RESPIRATORY (INHALATION) at 03:14

## 2024-10-22 RX ADMIN — HEPARIN SODIUM 5000 UNIT(S): 10000 INJECTION INTRAVENOUS; SUBCUTANEOUS at 18:24

## 2024-10-22 RX ADMIN — Medication 100 MILLIGRAM(S): at 05:41

## 2024-10-22 NOTE — DISCHARGE NOTE NURSING/CASE MANAGEMENT/SOCIAL WORK - PATIENT PORTAL LINK FT
You can access the FollowMyHealth Patient Portal offered by [facility] by registering at the following website: [email or web address]. By joining [employer]’s FollowMyHealth portal, you will also be able to view your health information using other applications (apps) compatible with our system.

## 2024-10-22 NOTE — PROGRESS NOTE ADULT - SUBJECTIVE AND OBJECTIVE BOX
PULMONARY CONSULT SERVICE FOLLOW-UP NOTE    INTERVAL HPI:  Reviewed chart and overnight events; patient seen and examined at bedside. No new respiratory complaints today; feels breathing continues to improve and has more energy. No CP, palpitations, or cough currently.     MEDICATIONS:  Pulmonary:  albuterol/ipratropium for Nebulization 3 milliLiter(s) Nebulizer every 6 hours  benzonatate 100 milliGRAM(s) Oral every 8 hours PRN  guaiFENesin  milliGRAM(s) Oral every 12 hours PRN  sodium chloride 3%  Inhalation 4 milliLiter(s) Inhalation every 12 hours    Antimicrobials:  bictegravir 50 mG/emtricitabine 200 mG/tenofovir alafenamide 25 mG (BIKTARVY) 1 Tablet(s) Oral daily  vancomycin  IVPB 1000 milliGRAM(s) IV Intermittent every 24 hours    Anticoagulants:  heparin   Injectable 5000 Unit(s) SubCutaneous every 12 hours    Cardiac:  furosemide    Tablet 40 milliGRAM(s) Oral daily  metoprolol tartrate 25 milliGRAM(s) Oral two times a day    Allergies    No Known Allergies    Intolerances    Vital Signs Last 24 Hrs  T(C): 36.6 (22 Oct 2024 11:19), Max: 36.6 (22 Oct 2024 11:19)  T(F): 97.9 (22 Oct 2024 11:19), Max: 97.9 (22 Oct 2024 11:19)  HR: 95 (22 Oct 2024 11:19) (68 - 95)  BP: 103/59 (22 Oct 2024 11:19) (103/59 - 133/72)  BP(mean): --  RR: 18 (22 Oct 2024 11:19) (18 - 18)  SpO2: 95% (22 Oct 2024 11:19) (95% - 100%)    Parameters below as of 22 Oct 2024 11:19  Patient On (Oxygen Delivery Method): room air    10-21 @ 07:01  -  10-22 @ 07:00  --------------------------------------------------------  IN: 430 mL / OUT: 0 mL / NET: 430 mL    10-22 @ 07:01  -  10-22 @ 14:11  --------------------------------------------------------  IN: 560 mL / OUT: 0 mL / NET: 560 mL    PHYSICAL EXAM:  Constitutional: chronically ill and frail appearing man resting in bed; NAD  HEENT: atraumatic; PERRL, anicteric sclera; MMM  Neck: supple  Cardiovascular: +S1/S2, RRR  Respiratory: diminished bibasilar BS w/ few scattered crackles in upper fields  Gastrointestinal: soft, NT/ND  Extremities: WWP; 1+ LE pitting edema b/l; no clubbing or cyanosis  Vascular: 2+ radial pulses B/L  Neurological: awake and alert; ARMIJO    LABS:  CBC Full  -  ( 21 Oct 2024 08:10 )  WBC Count : 12.15 K/uL  RBC Count : 4.01 M/uL  Hemoglobin : 12.6 g/dL  Hematocrit : 38.4 %  Platelet Count - Automated : 183 K/uL  Mean Cell Volume : 95.8 fl  Mean Cell Hemoglobin : 31.4 pg  Mean Cell Hemoglobin Concentration : 32.8 gm/dL  Auto Neutrophil # : x  Auto Lymphocyte # : x  Auto Monocyte # : x  Auto Eosinophil # : x  Auto Basophil # : x  Auto Neutrophil % : x  Auto Lymphocyte % : x  Auto Monocyte % : x  Auto Eosinophil % : x  Auto Basophil % : x    10-21    142  |  110[H]  |  19[H]  ----------------------------<  118[H]  4.2   |  26  |  1.10    Ca    8.2[L]      21 Oct 2024 08:10    Urinalysis Basic - ( 21 Oct 2024 08:10 )    Color: x / Appearance: x / SG: x / pH: x  Gluc: 118 mg/dL / Ketone: x  / Bili: x / Urobili: x   Blood: x / Protein: x / Nitrite: x   Leuk Esterase: x / RBC: x / WBC x   Sq Epi: x / Non Sq Epi: x / Bacteria: x    RADIOLOGY & ADDITIONAL STUDIES:  No interval chest study for review

## 2024-10-22 NOTE — PROGRESS NOTE ADULT - ASSESSMENT
83yo man smoker w/ HIV (on ART) admitted for severe sepsis and bacteremia with Aerococcus, possibly from SSTI with concern for concomitant PNA and c/c/b AHRF. Found with bilateral pleural effusions, and signs of pulmonary edema on CT chest imaging; improving with abx and diuresis.     #AHRF  #PNA  #Bilateral pleural effusions w/ pulmonary edema    Recommend:  - cont broad spec ABx, defer to ID for coverage given bacteremia and multiple sources  - monitor off supplemental O  - duonebs q6h as needed and pulmonary hygiene measures, can cont 3% HS nebs for another 1-2 days then d/c  - mobilize OOBTC and ambulate as tolerated daily  - CT chest reviewed, pulmonary edema and bilateral effusions suggest there could be a cardiogenic source of fluid overload  --> cont diuresis per primary team/cardiology for goal neg fluid balance; monitoring I/Os would be helpful  - doubt PJP PNA if pt has been compliant with ART, unable to ascertain this from speaking directly to pt with family members present  - agree w/ rehab for dispo    Please reach pulmonary for questions/concerns

## 2024-10-22 NOTE — PROGRESS NOTE ADULT - SUBJECTIVE AND OBJECTIVE BOX
84y Male    Meds:  bictegravir 50 mG/emtricitabine 200 mG/tenofovir alafenamide 25 mG (BIKTARVY) 1 Tablet(s) Oral daily  vancomycin  IVPB 1000 milliGRAM(s) IV Intermittent every 24 hours    Allergies    No Known Allergies    Intolerances        VITALS:  Vital Signs Last 24 Hrs  T(C): 36.6 (22 Oct 2024 11:19), Max: 36.6 (22 Oct 2024 11:19)  T(F): 97.9 (22 Oct 2024 11:19), Max: 97.9 (22 Oct 2024 11:19)  HR: 95 (22 Oct 2024 11:19) (68 - 95)  BP: 103/59 (22 Oct 2024 11:19) (103/59 - 133/72)  BP(mean): --  RR: 18 (22 Oct 2024 11:19) (18 - 18)  SpO2: 95% (22 Oct 2024 11:19) (95% - 100%)    Parameters below as of 22 Oct 2024 11:19  Patient On (Oxygen Delivery Method): room air        LABS/DIAGNOSTIC TESTS:                          12.6   12.15 )-----------( 183      ( 21 Oct 2024 08:10 )             38.4         10-21    142  |  110[H]  |  19[H]  ----------------------------<  118[H]  4.2   |  26  |  1.10    Ca    8.2[L]      21 Oct 2024 08:10            CULTURES: .Blood BLOOD  10-15 @ 07:08   No growth at 5 days  --  --      .Blood BLOOD  10-15 @ 07:00   No growth at 5 days  --  --      .Sputum Sputum  10-14 @ 06:14   Commensal javy consistent with body site  --    Rare polymorphonuclear leukocytes per low power field  Rare Squamous epithelial cells per low power field  Rare Yeast like cells per oil power field      .Blood BLOOD  10-13 @ 13:45   No growth at 5 days  --  --      .Blood BLOOD  10-13 @ 13:30   No growth at 5 days  --  --      .Blood BLOOD  10-12 @ 15:20   Growth in aerobic bottle: Staphylococcus epidermidis  "Susceptibilities not performed"  Growth in aerobic and anaerobic bottles: Aerococcus viridans  Growth in anaerobic bottle: Aerococcus urinae  Isolates of Aerococcus spp. are predictably susceptible to penicillin,  ampicillin, and vancomycin. All isolates are resistant to sulfonamides.  --    Growth in aerobic bottle: Gram Positive Cocci in Clusters  Growth in anaerobic bottle: Gram Positive Cocci in Clusters      .Blood BLOOD  10-12 @ 15:15   Growth in aerobic and anaerobic bottles: Aerococcus viridans  Growth in anaerobic bottle: Aerococcus urinae  Isolates of Aerococcus spp. are predictably susceptible to penicillin,  ampicillin, and vancomycin. All isolates are resistant to sulfonamides.  Direct identification is available within approximately 3-5  hours either by Blood Panel Multiplexed PCR or Direct  MALDI-TOF. Details: [email or web address]  --  Blood Culture PCR  Blood Culture PCR            RADIOLOGY:      ROS:  [  ] UNABLE TO ELICIT 84y Male who is doing well , he has some bodyaches but is otherwise asymptomatic, he has no fevers or chills, no diarrhea. I made a mistake and put him on Biktarvy instead of Genvoya which he takes at home and so has been corrected. He is being discharged to a rehab facility today.    Meds:  bictegravir 50 mG/emtricitabine 200 mG/tenofovir alafenamide 25 mG (BIKTARVY) 1 Tablet(s) Oral daily  vancomycin  IVPB 1000 milliGRAM(s) IV Intermittent every 24 hours    Allergies    No Known Allergies    Intolerances        VITALS:  Vital Signs Last 24 Hrs  T(C): 36.6 (22 Oct 2024 11:19), Max: 36.6 (22 Oct 2024 11:19)  T(F): 97.9 (22 Oct 2024 11:19), Max: 97.9 (22 Oct 2024 11:19)  HR: 95 (22 Oct 2024 11:19) (68 - 95)  BP: 103/59 (22 Oct 2024 11:19) (103/59 - 133/72)  BP(mean): --  RR: 18 (22 Oct 2024 11:19) (18 - 18)  SpO2: 95% (22 Oct 2024 11:19) (95% - 100%)    Parameters below as of 22 Oct 2024 11:19  Patient On (Oxygen Delivery Method): room air        LABS/DIAGNOSTIC TESTS:                          12.6   12.15 )-----------( 183      ( 21 Oct 2024 08:10 )             38.4         10-21    142  |  110[H]  |  19[H]  ----------------------------<  118[H]  4.2   |  26  |  1.10    Ca    8.2[L]      21 Oct 2024 08:10            CULTURES: .Blood BLOOD  10-15 @ 07:08   No growth at 5 days  --  --      .Blood BLOOD  10-15 @ 07:00   No growth at 5 days  --  --      .Sputum Sputum  10-14 @ 06:14   Commensal javy consistent with body site  --    Rare polymorphonuclear leukocytes per low power field  Rare Squamous epithelial cells per low power field  Rare Yeast like cells per oil power field      .Blood BLOOD  10-13 @ 13:45   No growth at 5 days  --  --      .Blood BLOOD  10-13 @ 13:30   No growth at 5 days  --  --      .Blood BLOOD  10-12 @ 15:20   Growth in aerobic bottle: Staphylococcus epidermidis  "Susceptibilities not performed"  Growth in aerobic and anaerobic bottles: Aerococcus viridans  Growth in anaerobic bottle: Aerococcus urinae  Isolates of Aerococcus spp. are predictably susceptible to penicillin,  ampicillin, and vancomycin. All isolates are resistant to sulfonamides.  --    Growth in aerobic bottle: Gram Positive Cocci in Clusters  Growth in anaerobic bottle: Gram Positive Cocci in Clusters      .Blood BLOOD  10-12 @ 15:15   Growth in aerobic and anaerobic bottles: Aerococcus viridans  Growth in anaerobic bottle: Aerococcus urinae  Isolates of Aerococcus spp. are predictably susceptible to penicillin,  ampicillin, and vancomycin. All isolates are resistant to sulfonamides.  Direct identification is available within approximately 3-5  hours either by Blood Panel Multiplexed PCR or Direct  MALDI-TOF. Details: [email or web address]  --  Blood Culture PCR  Blood Culture PCR            RADIOLOGY:      ROS:  [  ] UNABLE TO ELICIT

## 2024-10-22 NOTE — PROGRESS NOTE ADULT - PROVIDER SPECIALTY LIST ADULT
Cardiology
Hospitalist
Infectious Disease
Neurology
Podiatry
Cardiology
Hospitalist
Internal Medicine
Pulmonology
Vascular Surgery
Cardiology
Infectious Disease
Pulmonology
Vascular Surgery
Cardiology
Internal Medicine
Cardiology
Internal Medicine

## 2024-10-22 NOTE — DISCHARGE NOTE NURSING/CASE MANAGEMENT/SOCIAL WORK - NSDCPEFALRISK_GEN_ALL_CORE
Follow heart healthy diet. regular exercise.    For information on Fall & Injury Prevention, visit: [email or web address] OR  [email or web address] OR  https://www.cdc.gov/steadi/patient.html

## 2024-10-22 NOTE — DISCHARGE NOTE NURSING/CASE MANAGEMENT/SOCIAL WORK - FINANCIAL ASSISTANCE
[facility] provides services at a reduced cost to those who are determined to be eligible through [facility]’s financial assistance program. Information regarding [facility]’s financial assistance program can be found by going to [email or web address] or by calling [phone].

## 2024-10-22 NOTE — PROGRESS NOTE ADULT - TIME BILLING
- Review of records, telemetry, vital signs and daily labs.   - General and cardiovascular physical examination.  - Generation of cardiovascular treatment plan and completion of note .  - Coordination of care.      Patient was seen and examined by me on  10/17/24,interim events noted,labs and radiology studies reviewed.  Ernesto Danile MD,FACC.  [address] [facility].  [phone]
- Review of records, telemetry, vital signs and daily labs.   - General and cardiovascular physical examination.  - Generation of cardiovascular treatment plan and completion of note .  - Coordination of care.      Patient was seen and examined by me on  10/21/24,interim events noted,labs and radiology studies reviewed.  Ernesto Daniel MD,FACC.  [address] [facility].  [phone]
- Review of records, telemetry, vital signs and daily labs.   - General and cardiovascular physical examination.  - Generation of cardiovascular treatment plan and completion of note .  - Coordination of care.      Patient was seen and examined by me on  10/18/24,interim events noted,labs and radiology studies reviewed.  Ernesto Daniel MD,FACC.  [address] [facility].  [phone]
- personally reviewed pt's labs, VS/flowsheets, pertinent imaging, and consultant notes  - bedside SpO2 measurement to determine supplemental O2 needs  - general pulm hx/exam  - medication reconciliation  - coordination of care with primary team
- personally reviewed pt's labs, VS/flowsheets, pertinent imaging, and consultant notes  - bedside SpO2 measurement to determine supplemental O2 needs  - general pulm hx/exam  - medication reconciliation  - coordination of care with primary team
- Review of records, telemetry, vital signs and daily labs.   - General and cardiovascular physical examination.  - Generation of cardiovascular treatment plan and completion of note .  - Coordination of care.      Patient was seen and examined by me on  10/16/24,interim events noted,labs and radiology studies reviewed.  Ernesto Daniel MD,FACC.  [address] [facility].  [phone]
- Review of records, telemetry, vital signs and daily labs.   - General and cardiovascular physical examination.  - Generation of cardiovascular treatment plan and completion of note .  - Coordination of care.      Patient was seen and examined by me on  10/20/24,interim events noted,labs and radiology studies reviewed.  Ernesto Daniel MD,FACC.  [address] [facility].  [phone]
- Review of records, telemetry, vital signs and daily labs.   - General and cardiovascular physical examination.  - Generation of cardiovascular treatment plan and completion of note .  - Coordination of care.      Patient was seen and examined by me on  10/15/24,interim events noted,labs and radiology studies reviewed.  Ernesto Daniel MD,FACC.  [address] [facility].  [phone]
- Review of records, telemetry, vital signs and daily labs.   - General and cardiovascular physical examination.  - Generation of cardiovascular treatment plan and completion of note .  - Coordination of care.      Patient was seen and examined by me on  10/19/24,interim events noted,labs and radiology studies reviewed.  Ernesto Daniel MD,FACC.  [address] [facility].  [phone]

## 2024-10-22 NOTE — PROGRESS NOTE ADULT - ASSESSMENT
Bacteremia with 2 species of Aerococcus ( both Viridans and Urinae )  HIV +  Leukocytosis - mild      Plan - Cont Vancomycin 1gm iv q12hrs give till 10/27/24  restart Genvoya 1 tab po qd   DC planning for today.  Reconsult prn.

## 2024-11-26 PROCEDURE — 82607 VITAMIN B-12: CPT

## 2024-11-26 PROCEDURE — 36415 COLL VENOUS BLD VENIPUNCTURE: CPT

## 2024-11-26 PROCEDURE — 70553 MRI BRAIN STEM W/O & W/DYE: CPT | Mod: MC

## 2024-11-26 PROCEDURE — 84295 ASSAY OF SERUM SODIUM: CPT

## 2024-11-26 PROCEDURE — 83735 ASSAY OF MAGNESIUM: CPT

## 2024-11-26 PROCEDURE — 86738 MYCOPLASMA ANTIBODY: CPT

## 2024-11-26 PROCEDURE — 84132 ASSAY OF SERUM POTASSIUM: CPT

## 2024-11-26 PROCEDURE — 94640 AIRWAY INHALATION TREATMENT: CPT

## 2024-11-26 PROCEDURE — 85025 COMPLETE CBC W/AUTO DIFF WBC: CPT

## 2024-11-26 PROCEDURE — 83605 ASSAY OF LACTIC ACID: CPT

## 2024-11-26 PROCEDURE — 93306 TTE W/DOPPLER COMPLETE: CPT

## 2024-11-26 PROCEDURE — 73630 X-RAY EXAM OF FOOT: CPT

## 2024-11-26 PROCEDURE — 85730 THROMBOPLASTIN TIME PARTIAL: CPT

## 2024-11-26 PROCEDURE — 85610 PROTHROMBIN TIME: CPT

## 2024-11-26 PROCEDURE — 83615 LACTATE (LD) (LDH) ENZYME: CPT

## 2024-11-26 PROCEDURE — 80048 BASIC METABOLIC PNL TOTAL CA: CPT

## 2024-11-26 PROCEDURE — 87205 SMEAR GRAM STAIN: CPT

## 2024-11-26 PROCEDURE — 92610 EVALUATE SWALLOWING FUNCTION: CPT

## 2024-11-26 PROCEDURE — 93970 EXTREMITY STUDY: CPT

## 2024-11-26 PROCEDURE — 82803 BLOOD GASES ANY COMBINATION: CPT

## 2024-11-26 PROCEDURE — 96361 HYDRATE IV INFUSION ADD-ON: CPT

## 2024-11-26 PROCEDURE — 87449 NOS EACH ORGANISM AG IA: CPT

## 2024-11-26 PROCEDURE — 84145 PROCALCITONIN (PCT): CPT

## 2024-11-26 PROCEDURE — 86359 T CELLS TOTAL COUNT: CPT

## 2024-11-26 PROCEDURE — 86780 TREPONEMA PALLIDUM: CPT

## 2024-11-26 PROCEDURE — 87070 CULTURE OTHR SPECIMN AEROBIC: CPT

## 2024-11-26 PROCEDURE — 86357 NK CELLS TOTAL COUNT: CPT

## 2024-11-26 PROCEDURE — 70450 CT HEAD/BRAIN W/O DYE: CPT | Mod: MC

## 2024-11-26 PROCEDURE — 80202 ASSAY OF VANCOMYCIN: CPT

## 2024-11-26 PROCEDURE — 0225U NFCT DS DNA&RNA 21 SARSCOV2: CPT

## 2024-11-26 PROCEDURE — 86355 B CELLS TOTAL COUNT: CPT

## 2024-11-26 PROCEDURE — 85027 COMPLETE CBC AUTOMATED: CPT

## 2024-11-26 PROCEDURE — 86360 T CELL ABSOLUTE COUNT/RATIO: CPT

## 2024-11-26 PROCEDURE — 87636 SARSCOV2 & INF A&B AMP PRB: CPT

## 2024-11-26 PROCEDURE — 93325 DOPPLER ECHO COLOR FLOW MAPG: CPT

## 2024-11-26 PROCEDURE — 96374 THER/PROPH/DIAG INJ IV PUSH: CPT

## 2024-11-26 PROCEDURE — 87150 DNA/RNA AMPLIFIED PROBE: CPT

## 2024-11-26 PROCEDURE — 87640 STAPH A DNA AMP PROBE: CPT

## 2024-11-26 PROCEDURE — 97530 THERAPEUTIC ACTIVITIES: CPT

## 2024-11-26 PROCEDURE — 97162 PT EVAL MOD COMPLEX 30 MIN: CPT

## 2024-11-26 PROCEDURE — 87077 CULTURE AEROBIC IDENTIFY: CPT

## 2024-11-26 PROCEDURE — 84443 ASSAY THYROID STIM HORMONE: CPT

## 2024-11-26 PROCEDURE — 97110 THERAPEUTIC EXERCISES: CPT

## 2024-11-26 PROCEDURE — 93312 ECHO TRANSESOPHAGEAL: CPT

## 2024-11-26 PROCEDURE — 71250 CT THORAX DX C-: CPT | Mod: MC

## 2024-11-26 PROCEDURE — 87536 HIV-1 QUANT&REVRSE TRNSCRPJ: CPT

## 2024-11-26 PROCEDURE — 87641 MR-STAPH DNA AMP PROBE: CPT

## 2024-11-26 PROCEDURE — 82746 ASSAY OF FOLIC ACID SERUM: CPT

## 2024-11-26 PROCEDURE — 86592 SYPHILIS TEST NON-TREP QUAL: CPT

## 2024-11-26 PROCEDURE — 81001 URINALYSIS AUTO W/SCOPE: CPT

## 2024-11-26 PROCEDURE — 93005 ELECTROCARDIOGRAM TRACING: CPT

## 2024-11-26 PROCEDURE — 84100 ASSAY OF PHOSPHORUS: CPT

## 2024-11-26 PROCEDURE — A9585: CPT

## 2024-11-26 PROCEDURE — 80053 COMPREHEN METABOLIC PANEL: CPT

## 2024-11-26 PROCEDURE — 84484 ASSAY OF TROPONIN QUANT: CPT

## 2024-11-26 PROCEDURE — 87040 BLOOD CULTURE FOR BACTERIA: CPT

## 2024-11-26 PROCEDURE — 71045 X-RAY EXAM CHEST 1 VIEW: CPT

## 2024-11-26 PROCEDURE — 87899 AGENT NOS ASSAY W/OPTIC: CPT

## 2024-11-26 PROCEDURE — 82550 ASSAY OF CK (CPK): CPT

## 2024-11-26 PROCEDURE — 93320 DOPPLER ECHO COMPLETE: CPT

## 2024-11-26 PROCEDURE — 99285 EMERGENCY DEPT VISIT HI MDM: CPT

## 2024-12-12 NOTE — ASU PATIENT PROFILE, ADULT - IS PATIENT PREGNANT?
Patient has  unspecified  heart failure that is Chronic. On presentation their CHF was . Most recent BNP and echo results are listed below.well compensated  Recent Labs     12/10/24  1055   BNP 88       Latest ECHO  Results for orders placed during the hospital encounter of 01/08/21    Echo Color Flow Doppler? Yes    Interpretation Summary  · The left ventricle is normal in size with moderate concentric hypertrophy and normal systolic function. The estimated ejection fraction is 60%  · Indeterminate left ventricular diastolic function.  · Normal right ventricular size with normal right ventricular systolic function.  · Mild tricuspid regurgitation.  · Normal central venous pressure (3 mmHg).  · The estimated PA systolic pressure is 32 mmHg.    Current Heart Failure Medications  , Every 12 hours, Oral  , 2 times daily with meals, Oral    Plan  - Monitor strict I&Os and daily weights.    - Place on telemetry  - Low sodium diet  - Place on fluid restriction of 1.5 L.   - Cardiology has been consulted  - The patient's volume status is at their baseline  Ambulatory oxygen evaluation   Nutrition consult    not applicable (Male)

## (undated) DEVICE — WRAP COMPRESSION CALF MED

## (undated) DEVICE — BLANKET WARMER UPPER ADULT

## (undated) DEVICE — SUT POLYSORB 3-0 30" V-20 UNDYED

## (undated) DEVICE — DRSG TEGADERM 4X4.75"

## (undated) DEVICE — SPONGE DISSECTOR PEANUT

## (undated) DEVICE — ELCTR GROUNDING PAD ADULT COVIDIEN

## (undated) DEVICE — DRSG 4X4

## (undated) DEVICE — SUT POLYSORB 2-0 18" TIES UNDYED

## (undated) DEVICE — GLV 7 PROTEXIS

## (undated) DEVICE — SUT POLYSORB 4-0 27" P-12 UNDYED

## (undated) DEVICE — DRAPE LAPAROTOMY W POUCHES

## (undated) DEVICE — SUT SURGIPRO 2-0 30" GS-22

## (undated) DEVICE — DRAIN PENROSE 5/8" X 18"

## (undated) DEVICE — SUT SOFSILK 2-0 30" V-20

## (undated) DEVICE — PREP CHLORAPREP ORANGE 2PCT 26ML

## (undated) DEVICE — DRAPE LIGHT HANDLE COVER BLUE

## (undated) DEVICE — SOL IRR POUR NS 0.9% 1500ML

## (undated) DEVICE — SUT POLYSORB 2-0 30" V-20 UNDYED

## (undated) DEVICE — PACK MINOR NO DRAPE

## (undated) DEVICE — DRSG MASTISOL

## (undated) DEVICE — FOR-ESU VALLEYLAB T7E15009DX: Type: DURABLE MEDICAL EQUIPMENT

## (undated) DEVICE — NDL HYPO SAFE 25G X 1.5"